# Patient Record
Sex: FEMALE | Race: WHITE | NOT HISPANIC OR LATINO | URBAN - NONMETROPOLITAN AREA
[De-identification: names, ages, dates, MRNs, and addresses within clinical notes are randomized per-mention and may not be internally consistent; named-entity substitution may affect disease eponyms.]

---

## 2023-06-05 DIAGNOSIS — E03.9 HYPOTHYROIDISM, UNSPECIFIED TYPE: ICD-10-CM

## 2023-06-05 DIAGNOSIS — I48.0 PAROXYSMAL ATRIAL FIBRILLATION (MULTI): ICD-10-CM

## 2023-06-05 DIAGNOSIS — I10 BENIGN ESSENTIAL HTN: Primary | ICD-10-CM

## 2023-06-05 DIAGNOSIS — E78.49 OTHER HYPERLIPIDEMIA: ICD-10-CM

## 2023-06-05 RX ORDER — AMLODIPINE BESYLATE 5 MG/1
5 TABLET ORAL DAILY
COMMUNITY
End: 2023-06-05 | Stop reason: SDUPTHER

## 2023-06-05 RX ORDER — LEVOTHYROXINE SODIUM 75 UG/1
75 TABLET ORAL DAILY
Qty: 30 TABLET | Refills: 0 | Status: SHIPPED | OUTPATIENT
Start: 2023-06-05 | End: 2023-07-14 | Stop reason: SDUPTHER

## 2023-06-05 RX ORDER — LEVOTHYROXINE SODIUM 75 UG/1
1 TABLET ORAL DAILY
COMMUNITY
Start: 2013-10-18 | End: 2023-06-05 | Stop reason: SDUPTHER

## 2023-06-05 RX ORDER — LOSARTAN POTASSIUM 100 MG/1
100 TABLET ORAL DAILY
Qty: 30 TABLET | Refills: 0 | Status: SHIPPED | OUTPATIENT
Start: 2023-06-05 | End: 2023-07-14 | Stop reason: SDUPTHER

## 2023-06-05 RX ORDER — LOSARTAN POTASSIUM 100 MG/1
100 TABLET ORAL DAILY
COMMUNITY
End: 2023-06-05 | Stop reason: SDUPTHER

## 2023-06-05 RX ORDER — LOSARTAN POTASSIUM 100 MG/1
TABLET ORAL
Qty: 90 TABLET | Refills: 0 | OUTPATIENT
Start: 2023-06-05

## 2023-06-05 RX ORDER — METOPROLOL TARTRATE 100 MG/1
100 TABLET ORAL 2 TIMES DAILY
Qty: 60 TABLET | Refills: 0 | Status: SHIPPED | OUTPATIENT
Start: 2023-06-05 | End: 2023-07-14 | Stop reason: SDUPTHER

## 2023-06-05 RX ORDER — PRAVASTATIN SODIUM 40 MG/1
40 TABLET ORAL DAILY
COMMUNITY
End: 2023-06-05 | Stop reason: SDUPTHER

## 2023-06-05 RX ORDER — APIXABAN 2.5 MG/1
2.5 TABLET, FILM COATED ORAL 2 TIMES DAILY
COMMUNITY
Start: 2019-01-23 | End: 2023-06-05 | Stop reason: SDUPTHER

## 2023-06-05 RX ORDER — AMLODIPINE BESYLATE 5 MG/1
5 TABLET ORAL DAILY
Qty: 30 TABLET | Refills: 0 | Status: SHIPPED | OUTPATIENT
Start: 2023-06-05 | End: 2023-07-14 | Stop reason: SDUPTHER

## 2023-06-05 RX ORDER — METOPROLOL TARTRATE 100 MG/1
100 TABLET ORAL 2 TIMES DAILY
COMMUNITY
End: 2023-06-05 | Stop reason: SDUPTHER

## 2023-06-05 RX ORDER — PRAVASTATIN SODIUM 40 MG/1
40 TABLET ORAL DAILY
Qty: 30 TABLET | Refills: 0 | Status: SHIPPED | OUTPATIENT
Start: 2023-06-05 | End: 2023-07-14 | Stop reason: SDUPTHER

## 2023-06-05 RX ORDER — PRAVASTATIN SODIUM 40 MG/1
TABLET ORAL
Qty: 90 TABLET | Refills: 0 | OUTPATIENT
Start: 2023-06-05

## 2023-07-06 PROBLEM — H35.30 MACULAR DEGENERATION: Status: ACTIVE | Noted: 2023-07-06

## 2023-07-06 PROBLEM — I48.0 PAROXYSMAL ATRIAL FIBRILLATION (MULTI): Status: ACTIVE | Noted: 2023-07-06

## 2023-07-06 PROBLEM — M54.50 LOW BACK PAIN: Status: ACTIVE | Noted: 2023-07-06

## 2023-07-06 PROBLEM — M85.80 OSTEOPENIA: Status: ACTIVE | Noted: 2023-07-06

## 2023-07-06 PROBLEM — I10 BENIGN ESSENTIAL HYPERTENSION: Status: ACTIVE | Noted: 2023-07-06

## 2023-07-06 PROBLEM — E55.9 VITAMIN D DEFICIENCY: Status: ACTIVE | Noted: 2023-07-06

## 2023-07-06 PROBLEM — E03.9 HYPOTHYROIDISM: Status: ACTIVE | Noted: 2023-07-06

## 2023-07-06 PROBLEM — E78.5 HYPERLIPIDEMIA: Status: ACTIVE | Noted: 2023-07-06

## 2023-07-06 PROBLEM — M79.606 LOWER EXTREMITY PAIN, DIFFUSE: Status: ACTIVE | Noted: 2023-07-06

## 2023-07-14 ENCOUNTER — OFFICE VISIT (OUTPATIENT)
Dept: PRIMARY CARE | Facility: CLINIC | Age: 88
End: 2023-07-14
Payer: MEDICARE

## 2023-07-14 VITALS
OXYGEN SATURATION: 97 % | BODY MASS INDEX: 22.26 KG/M2 | DIASTOLIC BLOOD PRESSURE: 68 MMHG | HEART RATE: 57 BPM | SYSTOLIC BLOOD PRESSURE: 132 MMHG | HEIGHT: 62 IN | WEIGHT: 121 LBS

## 2023-07-14 DIAGNOSIS — E78.49 OTHER HYPERLIPIDEMIA: ICD-10-CM

## 2023-07-14 DIAGNOSIS — B35.0 TINEA CAPITIS: ICD-10-CM

## 2023-07-14 DIAGNOSIS — F33.9 DEPRESSION, RECURRENT (CMS-HCC): ICD-10-CM

## 2023-07-14 DIAGNOSIS — E03.9 HYPOTHYROIDISM, UNSPECIFIED TYPE: ICD-10-CM

## 2023-07-14 DIAGNOSIS — E55.9 VITAMIN D DEFICIENCY: ICD-10-CM

## 2023-07-14 DIAGNOSIS — Z00.00 ROUTINE GENERAL MEDICAL EXAMINATION AT HEALTH CARE FACILITY: Primary | ICD-10-CM

## 2023-07-14 DIAGNOSIS — I48.0 PAROXYSMAL ATRIAL FIBRILLATION (MULTI): ICD-10-CM

## 2023-07-14 DIAGNOSIS — I10 BENIGN ESSENTIAL HTN: ICD-10-CM

## 2023-07-14 LAB
ALANINE AMINOTRANSFERASE (SGPT) (U/L) IN SER/PLAS: 9 U/L (ref 7–45)
ALBUMIN (G/DL) IN SER/PLAS: 4 G/DL (ref 3.4–5)
ALKALINE PHOSPHATASE (U/L) IN SER/PLAS: 76 U/L (ref 33–136)
ANION GAP IN SER/PLAS: 10 MMOL/L (ref 10–20)
ASPARTATE AMINOTRANSFERASE (SGOT) (U/L) IN SER/PLAS: 16 U/L (ref 9–39)
BASOPHILS (10*3/UL) IN BLOOD BY AUTOMATED COUNT: 0.04 X10E9/L (ref 0–0.1)
BASOPHILS/100 LEUKOCYTES IN BLOOD BY AUTOMATED COUNT: 0.8 % (ref 0–2)
BILIRUBIN TOTAL (MG/DL) IN SER/PLAS: 0.9 MG/DL (ref 0–1.2)
CALCIUM (MG/DL) IN SER/PLAS: 8.8 MG/DL (ref 8.6–10.3)
CARBON DIOXIDE, TOTAL (MMOL/L) IN SER/PLAS: 29 MMOL/L (ref 21–32)
CHLORIDE (MMOL/L) IN SER/PLAS: 101 MMOL/L (ref 98–107)
CREATININE (MG/DL) IN SER/PLAS: 0.83 MG/DL (ref 0.5–1.05)
EOSINOPHILS (10*3/UL) IN BLOOD BY AUTOMATED COUNT: 0.07 X10E9/L (ref 0–0.4)
EOSINOPHILS/100 LEUKOCYTES IN BLOOD BY AUTOMATED COUNT: 1.5 % (ref 0–6)
ERYTHROCYTE DISTRIBUTION WIDTH (RATIO) BY AUTOMATED COUNT: 14.3 % (ref 11.5–14.5)
ERYTHROCYTE MEAN CORPUSCULAR HEMOGLOBIN CONCENTRATION (G/DL) BY AUTOMATED: 30.8 G/DL (ref 32–36)
ERYTHROCYTE MEAN CORPUSCULAR VOLUME (FL) BY AUTOMATED COUNT: 92 FL (ref 80–100)
ERYTHROCYTES (10*6/UL) IN BLOOD BY AUTOMATED COUNT: 4.46 X10E12/L (ref 4–5.2)
GFR FEMALE: 65 ML/MIN/1.73M2
GLUCOSE (MG/DL) IN SER/PLAS: 88 MG/DL (ref 74–99)
HEMATOCRIT (%) IN BLOOD BY AUTOMATED COUNT: 40.9 % (ref 36–46)
HEMOGLOBIN (G/DL) IN BLOOD: 12.6 G/DL (ref 12–16)
IMMATURE GRANULOCYTES/100 LEUKOCYTES IN BLOOD BY AUTOMATED COUNT: 0.2 % (ref 0–0.9)
LEUKOCYTES (10*3/UL) IN BLOOD BY AUTOMATED COUNT: 4.8 X10E9/L (ref 4.4–11.3)
LYMPHOCYTES (10*3/UL) IN BLOOD BY AUTOMATED COUNT: 1.12 X10E9/L (ref 0.8–3)
LYMPHOCYTES/100 LEUKOCYTES IN BLOOD BY AUTOMATED COUNT: 23.3 % (ref 13–44)
MONOCYTES (10*3/UL) IN BLOOD BY AUTOMATED COUNT: 0.46 X10E9/L (ref 0.05–0.8)
MONOCYTES/100 LEUKOCYTES IN BLOOD BY AUTOMATED COUNT: 9.6 % (ref 2–10)
NEUTROPHILS (10*3/UL) IN BLOOD BY AUTOMATED COUNT: 3.11 X10E9/L (ref 1.6–5.5)
NEUTROPHILS/100 LEUKOCYTES IN BLOOD BY AUTOMATED COUNT: 64.6 % (ref 40–80)
PLATELETS (10*3/UL) IN BLOOD AUTOMATED COUNT: 203 X10E9/L (ref 150–450)
POTASSIUM (MMOL/L) IN SER/PLAS: 4.1 MMOL/L (ref 3.5–5.3)
PROTEIN TOTAL: 6.9 G/DL (ref 6.4–8.2)
SODIUM (MMOL/L) IN SER/PLAS: 136 MMOL/L (ref 136–145)
THYROTROPIN (MIU/L) IN SER/PLAS BY DETECTION LIMIT <= 0.05 MIU/L: 11.64 MIU/L (ref 0.44–3.98)
UREA NITROGEN (MG/DL) IN SER/PLAS: 15 MG/DL (ref 6–23)

## 2023-07-14 PROCEDURE — 3075F SYST BP GE 130 - 139MM HG: CPT | Performed by: FAMILY MEDICINE

## 2023-07-14 PROCEDURE — 84443 ASSAY THYROID STIM HORMONE: CPT

## 2023-07-14 PROCEDURE — 99214 OFFICE O/P EST MOD 30 MIN: CPT | Performed by: FAMILY MEDICINE

## 2023-07-14 PROCEDURE — 1170F FXNL STATUS ASSESSED: CPT | Performed by: FAMILY MEDICINE

## 2023-07-14 PROCEDURE — 82306 VITAMIN D 25 HYDROXY: CPT

## 2023-07-14 PROCEDURE — 3078F DIAST BP <80 MM HG: CPT | Performed by: FAMILY MEDICINE

## 2023-07-14 PROCEDURE — 1160F RVW MEDS BY RX/DR IN RCRD: CPT | Performed by: FAMILY MEDICINE

## 2023-07-14 PROCEDURE — 1159F MED LIST DOCD IN RCRD: CPT | Performed by: FAMILY MEDICINE

## 2023-07-14 PROCEDURE — 80053 COMPREHEN METABOLIC PANEL: CPT

## 2023-07-14 PROCEDURE — 85025 COMPLETE CBC W/AUTO DIFF WBC: CPT

## 2023-07-14 PROCEDURE — G0439 PPPS, SUBSEQ VISIT: HCPCS | Performed by: FAMILY MEDICINE

## 2023-07-14 PROCEDURE — 1036F TOBACCO NON-USER: CPT | Performed by: FAMILY MEDICINE

## 2023-07-14 RX ORDER — METOPROLOL TARTRATE 100 MG/1
100 TABLET ORAL 2 TIMES DAILY
Qty: 180 TABLET | Refills: 3 | Status: SHIPPED | OUTPATIENT
Start: 2023-07-14

## 2023-07-14 RX ORDER — AMLODIPINE BESYLATE 5 MG/1
5 TABLET ORAL DAILY
Qty: 90 TABLET | Refills: 3 | Status: SHIPPED | OUTPATIENT
Start: 2023-07-14

## 2023-07-14 RX ORDER — KETOCONAZOLE 20 MG/G
CREAM TOPICAL 2 TIMES DAILY
Qty: 30 G | Refills: 2 | Status: SHIPPED | OUTPATIENT
Start: 2023-07-14 | End: 2023-07-24

## 2023-07-14 RX ORDER — LEVOTHYROXINE SODIUM 75 UG/1
75 TABLET ORAL DAILY
Qty: 90 TABLET | Refills: 3 | Status: SHIPPED | OUTPATIENT
Start: 2023-07-14

## 2023-07-14 RX ORDER — PRAVASTATIN SODIUM 40 MG/1
40 TABLET ORAL DAILY
Qty: 90 TABLET | Refills: 3 | Status: SHIPPED | OUTPATIENT
Start: 2023-07-14

## 2023-07-14 RX ORDER — ERGOCALCIFEROL 1.25 MG/1
50000 CAPSULE ORAL
Qty: 12 CAPSULE | Refills: 3 | Status: SHIPPED | OUTPATIENT
Start: 2023-07-14 | End: 2024-06-08

## 2023-07-14 RX ORDER — LOSARTAN POTASSIUM 100 MG/1
100 TABLET ORAL DAILY
Qty: 90 TABLET | Refills: 3 | Status: SHIPPED | OUTPATIENT
Start: 2023-07-14

## 2023-07-14 ASSESSMENT — PATIENT HEALTH QUESTIONNAIRE - PHQ9
6. FEELING BAD ABOUT YOURSELF - OR THAT YOU ARE A FAILURE OR HAVE LET YOURSELF OR YOUR FAMILY DOWN: NOT AT ALL
2. FEELING DOWN, DEPRESSED OR HOPELESS: NEARLY EVERY DAY
10. IF YOU CHECKED OFF ANY PROBLEMS, HOW DIFFICULT HAVE THESE PROBLEMS MADE IT FOR YOU TO DO YOUR WORK, TAKE CARE OF THINGS AT HOME, OR GET ALONG WITH OTHER PEOPLE: SOMEWHAT DIFFICULT
9. THOUGHTS THAT YOU WOULD BE BETTER OFF DEAD, OR OF HURTING YOURSELF: NOT AT ALL
8. MOVING OR SPEAKING SO SLOWLY THAT OTHER PEOPLE COULD HAVE NOTICED. OR THE OPPOSITE, BEING SO FIGETY OR RESTLESS THAT YOU HAVE BEEN MOVING AROUND A LOT MORE THAN USUAL: NOT AT ALL
SUM OF ALL RESPONSES TO PHQ9 QUESTIONS 1 AND 2: 4
7. TROUBLE CONCENTRATING ON THINGS, SUCH AS READING THE NEWSPAPER OR WATCHING TELEVISION: NOT AT ALL
5. POOR APPETITE OR OVEREATING: NEARLY EVERY DAY
4. FEELING TIRED OR HAVING LITTLE ENERGY: NEARLY EVERY DAY
3. TROUBLE FALLING OR STAYING ASLEEP OR SLEEPING TOO MUCH: NEARLY EVERY DAY
1. LITTLE INTEREST OR PLEASURE IN DOING THINGS: SEVERAL DAYS
SUM OF ALL RESPONSES TO PHQ QUESTIONS 1-9: 13

## 2023-07-14 ASSESSMENT — ACTIVITIES OF DAILY LIVING (ADL)
GROCERY_SHOPPING: NEEDS ASSISTANCE
MANAGING_FINANCES: NEEDS ASSISTANCE
DRESSING: INDEPENDENT
BATHING: INDEPENDENT
TAKING_MEDICATION: INDEPENDENT
DOING_HOUSEWORK: INDEPENDENT

## 2023-07-14 NOTE — ASSESSMENT & PLAN NOTE
Moderate depression -  declines medication -   Mostly due to how limited she is in her life -   She declines HHC or home PT -   I discussed reaching out for help when she needs it - she seemed to appreciate that

## 2023-07-14 NOTE — PATIENT INSTRUCTIONS
"When you need help with anything - please let me know  - I might be able to find you some help       I sent your refills to [facility]       Lets have you use the ketoconazole cream for your rash -  apply a small amout twice a day for 10 days  - do not get any in your eye       You need see the eye doctor - you could go to [facility]      We can give you blank copies of your living will and medial power of  papers  -   When you have them done - we need copies of them.        If you get to a point that you want to be Do NOT Resuscitate - let me know  - there is a paper we can fill out       ABOUT MEDICARE PREVENTATIVE APPOINTMENTS:     YOUR YEARLY MEDICARE WELLNESS VISIT IS VERY IMPORTANT.      Medicare wants all of their patients to schedule their \"Welcome to Medicare\" visit  when you are in the first 12 months of being on Medicare.    Then every year after that, they want you to schedule your  \"Annual Wellness\"  visit.     These visits have a very specific list of topics I have to cover at the visit,  so you will have paperwork you need to complete before I see you.   Of interest,  there is NOT actually a physical exam as part of this visit.       If you are female,   a Well Woman Exam  (Breast exam and pelvic exam) - are paid for by Medicare every 2 years.   Mammograms are paid for every year.    If you are due for this exam too,  the Medicare wellness and the well woman exam can be done on the same day,  PLEASE TELL THIS TO  WHEN MAKING THE APPOINTMENT.      Some of the Medicare plans ALSO cover a traditional \"physical\" - which has more of the physical exam component.   You may want to find out if your insurance covers that too.       (this is  the code - 08164)  - That can be added to the visit as well.    You would need to tell us.     IT'S VERY HELPFUL IF YOU KNOW WHAT YOUR PLAN COVERS AHEAD OF THE VISIT.      Please check to see what your plan(s) cover.   (Even checking on testing and " "vaccines that are covered or not helps us greatly!)     At these appointments ,  IF  we cover any of your chronic medical conditions,  medical concerns,  or medications,  I will also be billing brabi  \"E/M  code\" which stands for \"Evaluation and Management\"    -  which is a regular office visit code.    THAT CHARGE MAY BE SUBJECT TO CO-PAYS AND DEDUCTIBLES.     PLEASE DO NOT \"SAVE\" ALL OF YOUR CONCERNS TO GO OVER AT THESE YEARLY WELLNESS VISITS.   YOU SHOULD BE SCHEDULING SEPARATE APPOINTMENTS WHEN YOU HAVE HEALTH CONCERNS TO DISCUSS AND FOR YOUR MEDICATION CHECK UP APPOINTMENTS.        Thank you.            MAKE SURE YOU MAKE AN APPT IN A YEAR         "

## 2023-07-14 NOTE — PROGRESS NOTES
Subjective   Reason for Visit: Susan Singh is an 89 y.o. female here for a Medicare Wellness visit and follow up     Past Medical, Surgical, and Family History reviewed and updated in chart.    Reviewed all medications by prescribing practitioner or clinical pharmacist (such as prescriptions, OTCs, herbal therapies and supplements) and documented in the medical record.    HPI       Last appt 12/2020  - on the phone   LOV in office for HTN in OCT 2019     Updates and concerns:     Medicare screening  -  PHQ 9 score of 15                                        (Moderate severe depression)        Mostly - not sleeping well     Admits she she is not feeling well -   Worried a lot ,  hard to get around or do much   Does have someone help her with the books  Does not want medication     Has a rash above her left eye  - itches and flakes off    Gets meds at [facility] - does not want to change        Does not want to use [facility]     Has an hearing aid for her right ear     Eliquis is expensive - can still take it twice a day     Wants me to refill her meds today     VISION - VERY POOR ; when rash clears up  - she will go back to optho                 Medical issues reviewed today:     She was admitted to [facility] 1/22 - 1/23/19 - Afib with RVR -   Eliquis added - but HR controlled in hospital.   Echo done - EF 55 - 60 %      CT head:   IMPRESSION:  No acute infarct, hemorrhage or mass is noted.     The white matter and left basal ganglia lucencies may be secondary to  chronic microvascular ischemic or degenerative changes among others.        HTN - amlodipine 5 mg BID daily ; Losartan 100 mg daily, metoprolol tar 100 mg BID   Not checked BP lately      Watching sodium intake better.      Hypothyroid - Levo 75 mcg a day - takes it daily   10 /2019 TSH was WNL      Hyperchol - pravastatin 40 mg daily     She states she had a colonoscopy 2000 (age 72) - REPORT NOW ON CHART -   does not want another one      Does not get mammograms  "anymore.     Vaccines -   Flu: does not get them   Pneumonia - did have 23 and 13   Shingles - declines   COVID  -  she states she got \"all the vaccines\"   Tetnas/pertussis -        Falls - did fall when she was dizzy back in January 2019   States she has not fallen in the past year   Uses a walker    Declines home PT  or HHC      Does not drive.   She states she does not go out much. She lives with her brother - they grocery shop together - Brother drives -  He is 82      Living will and medical POA  - not done yet         Patient Care Team:  Fanny Santacruz MD as PCP - General     Review of Systems    Objective   Vitals:  /68 (BP Location: Right arm, Patient Position: Sitting, BP Cuff Size: Adult)   Pulse 57   Ht 1.562 m (5' 1.5\")   Wt 54.9 kg (121 lb)   SpO2 97%   BMI 22.49 kg/m²       Physical Exam  Vitals reviewed.   Constitutional:       General: She is not in acute distress.     Comments: Elderly woman , using walker , NAD    HENT:      Head: Normocephalic and atraumatic.      Nose: Nose normal.      Mouth/Throat:      Pharynx: No posterior oropharyngeal erythema.   Eyes:      Extraocular Movements: Extraocular movements intact.      Conjunctiva/sclera: Conjunctivae normal.      Pupils: Pupils are equal, round, and reactive to light.   Cardiovascular:      Rate and Rhythm: Normal rate and regular rhythm.      Heart sounds: Normal heart sounds. No murmur heard.  Pulmonary:      Effort: Pulmonary effort is normal. No respiratory distress.      Breath sounds: Normal breath sounds. No wheezing.   Abdominal:      Palpations: Abdomen is soft.      Tenderness: There is no abdominal tenderness.   Musculoskeletal:      Cervical back: Neck supple.   Lymphadenopathy:      Cervical: No cervical adenopathy.   Skin:     General: Skin is warm and dry.      Findings: Rash present.      Comments: Patch of thickened dry flaking skin over her left brow and forehead    Neurological:      General: No focal " deficit present.      Mental Status: She is alert and oriented to person, place, and time. Mental status is at baseline.   Psychiatric:         Mood and Affect: Mood normal.         Thought Content: Thought content normal.         Assessment/Plan   Problem List Items Addressed This Visit          High    Hyperlipidemia    Relevant Medications    pravastatin (Pravachol) 40 mg tablet    Other Relevant Orders    Comprehensive Metabolic Panel (Completed)    CBC and Auto Differential (Completed)    Thyroid Stimulating Hormone (Completed)    Vitamin D, Total    Hypothyroidism    Relevant Medications    levothyroxine (Synthroid, Levoxyl) 75 mcg tablet    Other Relevant Orders    Comprehensive Metabolic Panel (Completed)    CBC and Auto Differential (Completed)    Thyroid Stimulating Hormone (Completed)    Vitamin D, Total    Paroxysmal atrial fibrillation (CMS/HCC)    Relevant Medications    amLODIPine (Norvasc) 5 mg tablet    apixaban (Eliquis) 2.5 mg tablet    metoprolol tartrate (Lopressor) 100 mg tablet       Medium    Vitamin D deficiency    Relevant Medications    ergocalciferol (Vitamin D-2) 1.25 MG (32593 UT) capsule    Other Relevant Orders    Comprehensive Metabolic Panel (Completed)    CBC and Auto Differential (Completed)    Thyroid Stimulating Hormone (Completed)    Vitamin D, Total    Depression, recurrent (CMS/HCC)    Current Assessment & Plan     Moderate depression -  declines medication -   Mostly due to how limited she is in her life -   She declines HHC or home PT -   I discussed reaching out for help when she needs it - she seemed to appreciate that           Other Visit Diagnoses       Routine general medical examination at health care facility    -  Primary    Tinea capitis        Relevant Medications    ketoconazole (NIZOral) 2 % cream    Benign essential HTN        Relevant Medications    amLODIPine (Norvasc) 5 mg tablet    losartan (Cozaar) 100 mg tablet    metoprolol tartrate (Lopressor) 100 mg  tablet    Other Relevant Orders    Comprehensive Metabolic Panel (Completed)    CBC and Auto Differential (Completed)    Thyroid Stimulating Hormone (Completed)    Vitamin D, Total          First visit back in office since 2019 -     Medicare wellness today - age precludes most testing and she declines too     She seems generally stable  -   I refilled meds how she wanted me to     Rash on face looks like tinea - try ketoconazole cream     Urged eye appt     Urged filling out Living will and Medical POA papers -   She was not sure about DNR     She is using a walker which I told her was a great idea     She declined home health care or home PT -     Urged her to call anytime she needs something - I likely could find a way to help her  -     Can keep appts at yearly     Labs today     We discussed at visit any disease processes that were of concern as well as the risks, benefits and instructions of any new medication provided.    See orders and discussion section for information handed to patient on their Clinical Summary.   Patient (and/or caretaker of patient if present)  stated all questions were answered, and they voiced understanding of instructions.

## 2023-07-15 LAB — CALCIDIOL (25 OH VITAMIN D3) (NG/ML) IN SER/PLAS: 34 NG/ML

## 2023-07-17 DIAGNOSIS — E03.9 HYPOTHYROIDISM, UNSPECIFIED TYPE: Primary | ICD-10-CM

## 2023-08-22 ENCOUNTER — OFFICE VISIT (OUTPATIENT)
Dept: PRIMARY CARE | Facility: CLINIC | Age: 88
End: 2023-08-22
Payer: MEDICARE

## 2023-08-22 DIAGNOSIS — E03.9 HYPOTHYROIDISM, UNSPECIFIED TYPE: ICD-10-CM

## 2023-08-22 LAB — THYROTROPIN (MIU/L) IN SER/PLAS BY DETECTION LIMIT <= 0.05 MIU/L: 1.32 MIU/L (ref 0.44–3.98)

## 2023-08-22 PROCEDURE — 84443 ASSAY THYROID STIM HORMONE: CPT

## 2024-06-08 DIAGNOSIS — E55.9 VITAMIN D DEFICIENCY: ICD-10-CM

## 2024-06-08 RX ORDER — ERGOCALCIFEROL 1.25 MG/1
1 CAPSULE ORAL
Qty: 12 CAPSULE | Refills: 3 | Status: SHIPPED | OUTPATIENT
Start: 2024-06-09

## 2024-07-19 DIAGNOSIS — E03.9 HYPOTHYROIDISM, UNSPECIFIED TYPE: ICD-10-CM

## 2024-07-19 DIAGNOSIS — I10 BENIGN ESSENTIAL HTN: ICD-10-CM

## 2024-07-19 RX ORDER — LOSARTAN POTASSIUM 100 MG/1
100 TABLET ORAL DAILY
Qty: 90 TABLET | Refills: 0 | Status: SHIPPED | OUTPATIENT
Start: 2024-07-19

## 2024-07-19 RX ORDER — METOPROLOL TARTRATE 100 MG/1
100 TABLET ORAL 2 TIMES DAILY
Qty: 180 TABLET | Refills: 0 | Status: SHIPPED | OUTPATIENT
Start: 2024-07-19

## 2024-07-19 RX ORDER — LEVOTHYROXINE SODIUM 75 UG/1
75 TABLET ORAL DAILY
Qty: 90 TABLET | Refills: 0 | Status: SHIPPED | OUTPATIENT
Start: 2024-07-19

## 2024-07-27 DIAGNOSIS — E78.49 OTHER HYPERLIPIDEMIA: ICD-10-CM

## 2024-07-27 RX ORDER — PRAVASTATIN SODIUM 40 MG/1
40 TABLET ORAL DAILY
Qty: 90 TABLET | Refills: 0 | Status: SHIPPED | OUTPATIENT
Start: 2024-07-27

## 2024-08-06 DIAGNOSIS — I48.0 PAROXYSMAL ATRIAL FIBRILLATION (MULTI): ICD-10-CM

## 2024-08-06 RX ORDER — APIXABAN 2.5 MG/1
2.5 TABLET, FILM COATED ORAL 2 TIMES DAILY
Qty: 90 TABLET | Refills: 0 | Status: SHIPPED | OUTPATIENT
Start: 2024-08-06

## 2024-09-20 DIAGNOSIS — I10 BENIGN ESSENTIAL HTN: ICD-10-CM

## 2024-09-20 RX ORDER — AMLODIPINE BESYLATE 5 MG/1
5 TABLET ORAL DAILY
Qty: 90 TABLET | Refills: 0 | Status: SHIPPED | OUTPATIENT
Start: 2024-09-20

## 2024-09-26 ENCOUNTER — APPOINTMENT (OUTPATIENT)
Dept: PRIMARY CARE | Facility: CLINIC | Age: 89
End: 2024-09-26
Payer: MEDICARE

## 2024-09-26 VITALS
HEIGHT: 60 IN | OXYGEN SATURATION: 100 % | BODY MASS INDEX: 22.78 KG/M2 | DIASTOLIC BLOOD PRESSURE: 64 MMHG | HEART RATE: 57 BPM | WEIGHT: 116 LBS | SYSTOLIC BLOOD PRESSURE: 162 MMHG

## 2024-09-26 DIAGNOSIS — I10 BENIGN ESSENTIAL HTN: ICD-10-CM

## 2024-09-26 DIAGNOSIS — Z00.00 ROUTINE GENERAL MEDICAL EXAMINATION AT HEALTH CARE FACILITY: Primary | ICD-10-CM

## 2024-09-26 DIAGNOSIS — I48.0 PAROXYSMAL ATRIAL FIBRILLATION (MULTI): ICD-10-CM

## 2024-09-26 DIAGNOSIS — E78.49 OTHER HYPERLIPIDEMIA: ICD-10-CM

## 2024-09-26 DIAGNOSIS — E03.9 HYPOTHYROIDISM, UNSPECIFIED TYPE: ICD-10-CM

## 2024-09-26 LAB
ALBUMIN SERPL BCP-MCNC: 3.9 G/DL (ref 3.4–5)
ALP SERPL-CCNC: 76 U/L (ref 33–136)
ALT SERPL W P-5'-P-CCNC: 7 U/L (ref 7–45)
ANION GAP SERPL CALC-SCNC: 10 MMOL/L (ref 10–20)
AST SERPL W P-5'-P-CCNC: 15 U/L (ref 9–39)
BASOPHILS # BLD AUTO: 0.03 X10*3/UL (ref 0–0.1)
BASOPHILS NFR BLD AUTO: 0.7 %
BILIRUB SERPL-MCNC: 0.9 MG/DL (ref 0–1.2)
BUN SERPL-MCNC: 19 MG/DL (ref 6–23)
CALCIUM SERPL-MCNC: 8.8 MG/DL (ref 8.6–10.3)
CHLORIDE SERPL-SCNC: 102 MMOL/L (ref 98–107)
CO2 SERPL-SCNC: 29 MMOL/L (ref 21–32)
CREAT SERPL-MCNC: 0.85 MG/DL (ref 0.5–1.05)
EGFRCR SERPLBLD CKD-EPI 2021: 63 ML/MIN/1.73M*2
EOSINOPHIL # BLD AUTO: 0.11 X10*3/UL (ref 0–0.4)
EOSINOPHIL NFR BLD AUTO: 2.4 %
ERYTHROCYTE [DISTWIDTH] IN BLOOD BY AUTOMATED COUNT: 13.8 % (ref 11.5–14.5)
GLUCOSE SERPL-MCNC: 100 MG/DL (ref 74–99)
HCT VFR BLD AUTO: 38.6 % (ref 36–46)
HGB BLD-MCNC: 12.5 G/DL (ref 12–16)
IMM GRANULOCYTES # BLD AUTO: 0.02 X10*3/UL (ref 0–0.5)
IMM GRANULOCYTES NFR BLD AUTO: 0.4 % (ref 0–0.9)
LYMPHOCYTES # BLD AUTO: 0.96 X10*3/UL (ref 0.8–3)
LYMPHOCYTES NFR BLD AUTO: 21.1 %
MCH RBC QN AUTO: 29.2 PG (ref 26–34)
MCHC RBC AUTO-ENTMCNC: 32.4 G/DL (ref 32–36)
MCV RBC AUTO: 90 FL (ref 80–100)
MONOCYTES # BLD AUTO: 0.46 X10*3/UL (ref 0.05–0.8)
MONOCYTES NFR BLD AUTO: 10.1 %
NEUTROPHILS # BLD AUTO: 2.98 X10*3/UL (ref 1.6–5.5)
NEUTROPHILS NFR BLD AUTO: 65.3 %
NRBC BLD-RTO: 0 /100 WBCS (ref 0–0)
PLATELET # BLD AUTO: 207 X10*3/UL (ref 150–450)
POTASSIUM SERPL-SCNC: 4.4 MMOL/L (ref 3.5–5.3)
PROT SERPL-MCNC: 6.3 G/DL (ref 6.4–8.2)
RBC # BLD AUTO: 4.28 X10*6/UL (ref 4–5.2)
SODIUM SERPL-SCNC: 137 MMOL/L (ref 136–145)
T4 FREE SERPL-MCNC: 0.74 NG/DL (ref 0.61–1.12)
TSH SERPL-ACNC: 8.01 MIU/L (ref 0.44–3.98)
WBC # BLD AUTO: 4.6 X10*3/UL (ref 4.4–11.3)

## 2024-09-26 PROCEDURE — 1036F TOBACCO NON-USER: CPT | Performed by: FAMILY MEDICINE

## 2024-09-26 PROCEDURE — 84439 ASSAY OF FREE THYROXINE: CPT

## 2024-09-26 PROCEDURE — 1170F FXNL STATUS ASSESSED: CPT | Performed by: FAMILY MEDICINE

## 2024-09-26 PROCEDURE — 85025 COMPLETE CBC W/AUTO DIFF WBC: CPT

## 2024-09-26 PROCEDURE — 3078F DIAST BP <80 MM HG: CPT | Performed by: FAMILY MEDICINE

## 2024-09-26 PROCEDURE — 80053 COMPREHEN METABOLIC PANEL: CPT

## 2024-09-26 PROCEDURE — 1159F MED LIST DOCD IN RCRD: CPT | Performed by: FAMILY MEDICINE

## 2024-09-26 PROCEDURE — G0439 PPPS, SUBSEQ VISIT: HCPCS | Performed by: FAMILY MEDICINE

## 2024-09-26 PROCEDURE — 84443 ASSAY THYROID STIM HORMONE: CPT

## 2024-09-26 PROCEDURE — 3077F SYST BP >= 140 MM HG: CPT | Performed by: FAMILY MEDICINE

## 2024-09-26 PROCEDURE — 1160F RVW MEDS BY RX/DR IN RCRD: CPT | Performed by: FAMILY MEDICINE

## 2024-09-26 PROCEDURE — 99214 OFFICE O/P EST MOD 30 MIN: CPT | Performed by: FAMILY MEDICINE

## 2024-09-26 RX ORDER — LOSARTAN POTASSIUM 100 MG/1
100 TABLET ORAL DAILY
Qty: 90 TABLET | Refills: 3 | Status: SHIPPED | OUTPATIENT
Start: 2024-09-26

## 2024-09-26 RX ORDER — METOPROLOL TARTRATE 100 MG/1
100 TABLET ORAL 2 TIMES DAILY
Qty: 180 TABLET | Refills: 3 | Status: SHIPPED | OUTPATIENT
Start: 2024-09-26

## 2024-09-26 RX ORDER — PRAVASTATIN SODIUM 40 MG/1
40 TABLET ORAL DAILY
Qty: 90 TABLET | Refills: 3 | Status: SHIPPED | OUTPATIENT
Start: 2024-09-26

## 2024-09-26 RX ORDER — AMLODIPINE BESYLATE 5 MG/1
5 TABLET ORAL DAILY
Qty: 90 TABLET | Refills: 3 | Status: SHIPPED | OUTPATIENT
Start: 2024-09-26

## 2024-09-26 RX ORDER — LEVOTHYROXINE SODIUM 75 UG/1
75 TABLET ORAL DAILY
Qty: 90 TABLET | Refills: 3 | Status: SHIPPED | OUTPATIENT
Start: 2024-09-26

## 2024-09-26 ASSESSMENT — ACTIVITIES OF DAILY LIVING (ADL)
DOING_HOUSEWORK: INDEPENDENT
GROCERY_SHOPPING: INDEPENDENT
MANAGING_FINANCES: INDEPENDENT
DRESSING: INDEPENDENT
TAKING_MEDICATION: INDEPENDENT
BATHING: INDEPENDENT

## 2024-09-26 ASSESSMENT — PATIENT HEALTH QUESTIONNAIRE - PHQ9
SUM OF ALL RESPONSES TO PHQ QUESTIONS 1-9: 7
10. IF YOU CHECKED OFF ANY PROBLEMS, HOW DIFFICULT HAVE THESE PROBLEMS MADE IT FOR YOU TO DO YOUR WORK, TAKE CARE OF THINGS AT HOME, OR GET ALONG WITH OTHER PEOPLE: NOT DIFFICULT AT ALL
SUM OF ALL RESPONSES TO PHQ9 QUESTIONS 1 AND 2: 3
9. THOUGHTS THAT YOU WOULD BE BETTER OFF DEAD, OR OF HURTING YOURSELF: SEVERAL DAYS
1. LITTLE INTEREST OR PLEASURE IN DOING THINGS: NOT AT ALL
8. MOVING OR SPEAKING SO SLOWLY THAT OTHER PEOPLE COULD HAVE NOTICED. OR THE OPPOSITE, BEING SO FIGETY OR RESTLESS THAT YOU HAVE BEEN MOVING AROUND A LOT MORE THAN USUAL: SEVERAL DAYS
2. FEELING DOWN, DEPRESSED OR HOPELESS: NEARLY EVERY DAY
7. TROUBLE CONCENTRATING ON THINGS, SUCH AS READING THE NEWSPAPER OR WATCHING TELEVISION: NOT AT ALL
4. FEELING TIRED OR HAVING LITTLE ENERGY: SEVERAL DAYS
5. POOR APPETITE OR OVEREATING: NOT AT ALL
6. FEELING BAD ABOUT YOURSELF - OR THAT YOU ARE A FAILURE OR HAVE LET YOURSELF OR YOUR FAMILY DOWN: NOT AT ALL
3. TROUBLE FALLING OR STAYING ASLEEP OR SLEEPING TOO MUCH: SEVERAL DAYS

## 2024-09-26 NOTE — PATIENT INSTRUCTIONS
I sent your meds to [facility] - you will have to let them know when you need them     I can see you in a year if all is fine.        I will mail you out your test results       Hypertension Reminders:    IF YOU ARE A PERSON WHOSE BLOOD PRESSURE RUNS HIGH IN THE DOCTOR'S OFFICE,  THEN WE NEED TO VERIFY YOUR CUFF AT LEAST  ONCE YEARLY.   ALWAYS BRING CUFF WITH YOU TO ANY HYPERTENSION CHECK UP APPOINTMENT.  WE CAN RECORD YOUR BP  FROM HOME THE DAY OF THE APPOINTMENT - BUT WE HAVE TO SEE IT ON YOUR MACHINE.      To accurately check your blood pressure -  be sure to sit and relax for 5 minutes, you need your back supported, feet flat on the ground, arm heart level and relaxed.    Generally speaking, well controlled hypertension is below 130/80   for  most people  and if you are over 75, below 140/90  is acceptable.    Please take your medication as directed, and if you forget a dose  DO NOT DOUBLE THE DOSE THE NEXT DAY, just take is as you normally would.     It is important to stay on a low sodium diet :  1500 - 2000 mg of sodium a day  -  it is important to read labels.    Regular Exercise is very important as well.  Always gradually increase your exercise regimen.  Your goal is 30 minutes of a good cardiovascular exercise at least 5 days a week.    IF YOUR BMI (BODY MASS INDEX) IS OVER 25, LOSING WEIGHT WILL HELP CONTROL YOUR BLOOD PRESSURE.   Talk to me further if you need help doing this.        It is very important NOT to smoke  or use any tobacco products.  Talk to me about options if you want help quitting.    It is very important to keep your alcohol in take low.   Generally speaking, adult men should not drink more than 2 regular size beers a day, or no more than 2 ounces of liquor, or no more than 12 ounces of wine.  For adult women - the recommendations are half that.    BUT , THIS IS NOT UNIVERSAL   - be sure to ask me if alcohol is safe for you to drink, and if so, the acceptable amount.          For  "General Healthy Nutrition    (Remember - NOT A DIET!   Diets are only good for class reunions.)    These are my general good nutrition recommendations for most people.   I use the term \" diet \"  in these instructions to mean your overall nutrition - how you eat and drink.   If we talked about something different during your visit with me,  other than what is written below,  follow that advice instead.       For most people,  eating healthier means getting less added sugar and less processed foods in your diet    The fresher the better.    Added sugar is now a part of the nutrition label on manufactured food, so you can keep an eye on it easier.    But basically,  foods and beverages  that contain regular sugar and corn syrup are the main sources of added sugars.  Eating as little of these foods as you can is best.   One shocking example of the epidemic of added sugar is soda.    One can of regular soda contains about as much added sugar as 3 regular size doughnuts!     The other issue with processed foods is the amount of processed grains they contain , such as white flour.    This is also something you want to try to limit in your diet.     But, grain products are very important for your nutrition.    Whole grains are better for your body.     Cutting back on white breads, traditional pasta, baked goods, white rice,  and processed cereals will be healthier for you.   The better choices include whole grain breads,  whole wheat pasta,  brown rice, quinoa, barley, steel cut  or rolled oats.   If you eat cereal for breakfast, try to look for one made with whole grains and less sugar.   There are many people who have a problem with gluten, for a large variety of reasons.    Generally,  products made with wheat flour , barley or rye are the primary source of gluten.       Cutting back on saturated fats is important.    You want the majority of the meat that you eat to be chicken, fish or turkey.   Baked or broiled is " "best -  fried adds too much fat.    There are healthy fats that are important - fat is important for holding down appetite, vitamin absorption and several metabolic processes in the body.  Monounsaturated fats raise HDL (good cholesterol) and lower LDL (bad cholesterol).   Olive oil, peanut oil, nuts, seeds, and avocados are great sources of the good fats.       Ideas are:   Trade sour cream dip for hummus (which is rich in olive oil) or guacamole; use veggies or whole-wheat chips to dip.    Nuts are an excellent source of protein and healthy fats.   Tree nuts are the best kind, such as almonds or walnuts.   Just be careful - they are high in calories, so stick to a serving size.  (Most are about 200 calories for a 1/4 cup)      Proteins are very important for your body, and they also hold down your appetite.   Try to have protein with every meal.    These generally are meats, nuts, many beans, legumes, eggs, and dairy.   You will find protein in whole grain products and some green vegetables have a little too.     When you have dairy (if you can - many people are lactose intolerant) try to make it low fat.    Ideas are 1% milk, lowfat yogurt or cheeses, low fat cottage cheese.   I don't generally recommend FAT FREE because they often contain artificial products to improve taste, and the fat helps hold down your appetite.   If you are lactose intolerant, try to see if taking Lactaid before having dairy helps.      Fresh fruits and vegetables are VERY important.  The brighter the better.   Many vegetables are considered \"Free Foods\" - meaning you can eat as much as you want, and it does not matter.  These include tomatoes, cucumbers, celery, peppers, all the various lettuces and kale - to name a few.   Potatoes, corn and peas are starchy, so do have more calories, but are still healthy - you just want to watch the amount of them you eat.       Fruits are full of wonderful nutrition.   They contain natural sugar " called fructose, so eating them in moderation is best.   Diabetics may need to pay careful attention to how their body reacts to the sugar.  Some fruits might drastically increase their blood sugar.      Eating smaller meals with a couple of small snacks is better for your metabolism than not eating for long amounts of time  (breakfast is very important).   Trying to avoid large meals is helpful too.    Eating like this helps keep your appetite down and keeps you in burning metabolism rather than storage metabolism so your body will use the calories you eat.       I do not tell people to stop eating sweets or snack foods - just limit the amounts you have.  The less the better.   Pay attention to serving sizes, and treat them as a treat.        Foods like doughnuts, pop tarts, sugar cereals, cookies  ARE NOT GOOD FOR BREAKFAST.   They are loaded with sugar and will cause you to be hungrier in the day and often not feel well.    Caffeine needs to be limited - no more than 2 servings a day.  Some people can't have any at all.    (if you have any sleep or anxiety issues - stop the caffeine)   Coffee, many teas, many sodas, energy drinks, almost any diet supplement,  and chocolate all contain caffeine.      Water is important.   For most people, 8   x  8 ounces  a day are needed.  This may vary for some health issues.    If you need to be on a low sodium diet, that means looking at labels and eating only 1000 - 2000 mg of sodium a day.    Calcium intake is important.  3 servings of a high calcium food or drink a day is recommended.   This is usually a cup of milk, a cup of yogurt, an ounce of a hard cheese or 1.5 ounces of a soft cheese are the usual servings.   There are other high calcium foods - including soy or almond milk, broccoli,  almonds, dark green leafy vegetable.   Make sure you are not getting more than 1000  - 1200 mg of total calcium a day (unless you have been told you need more by a doctor).    Vitamin D  3 is important to absorb the calcium and for your immune system.   For children, 400 IU a day is recommended.   For adults - 800 - 5000 IU a day  is recommended.  (Often the amount needed is individualized for adults - be sure to ask how much is right for you)    Physical activity is very important for good health.    Finding activities that give you regular exercise is very important for good health.  Try to find exercise you enjoy doing on a regular basis.    30 minutes at least 5 days a week of a good cardiovascular exercise is recommended.   That means something that gets your heart rate going faster than your usual baseline and you can find yourself breathing harder than usual while you are exercising.  If you have not done any exercise in a long time,  make sure you ask if its safe for you to start,  and be sure to gradually work up to your goal.      If you need to lose weight,  following these recommendations will help you.   And if you are doing all of this and still not losing weight, then its likely just the amount of food you are eating.   Learn to cut back on portion sizes.  Using smaller plates may help.  Healthy weight loss is  only about a pound a week.   You have to remember that whatever you do to lose the weight, you must be prepared to keep it up for life for the weight to stay off.     A lot of people have a lot of luck with using something like a fit bit,  or a program where you keep track of all of your calories that you eat and what you burn off in the day.

## 2024-09-26 NOTE — PROGRESS NOTES
"Subjective   Reason for Visit: Susan Singh is an 89 y.o. female here for a Medicare Wellness visit and follow up     Past Medical, Surgical, and Family History reviewed and updated in chart.    Reviewed all medications by prescribing practitioner or clinical pharmacist (such as prescriptions, OTCs, herbal therapies and supplements) and documented in the medical record.    HPI       Last appt 7/2023     Updates and concerns:   89 year old woman - will only come see me once a year - does not want much done.       Rash on head  is healed up         Gets meds at [facility] - does not want to change        Does not want to use [facility]     Has an hearing aid for her right ear  -   Acting up  - got from [facility]    Eliquis is expensive - can still take it twice a day  - gets just 90 at at time  - declines samples     Wants me to refill her meds today  - but not sure she needs yet     VISION - [facility]  - has not gone     Getting solicitor calls all the time          Medical issues reviewed today:     She was admitted to [facility] 1/22 - 1/23/19 - Afib with RVR -   Eliquis added - but HR controlled in hospital.   Echo done - EF 55 - 60 %      CT head:   IMPRESSION:  No acute infarct, hemorrhage or mass is noted.     The white matter and left basal ganglia lucencies may be secondary to  chronic microvascular ischemic or degenerative changes among others.        HTN -   amlodipine 5 mg BID daily ;   Losartan 100 mg daily,   metoprolol tar 100 mg BID   Not checked BP lately      Watching sodium intake better.      Hypothyroid - Levo 75 mcg a day - takes it daily   2023  TSH was WNL      Hyperchol - pravastatin 40 mg daily     She states she had a colonoscopy 2000 (age 72) - REPORT NOW ON CHART -   does not want another one      Does not get mammograms anymore.     Vaccines -   Flu: does not get them   Pneumonia - did have 23 and 13 . Declines 20   Shingles - declines   COVID  -  she states she got \"all the vaccines\"   Tetnas/pertussis -      "   Falls - States she has not fallen in the past year   Uses a walker    Declines home PT  or HHC      Does not drive.   She states she does not go out much. She lives with her brother - they grocery shop together - Brother drives -  He is 83     Sister  - Paloma Leal      Then had 6 bothers-       Only Sree , Yordy and Kale are alive       Living will and medical POA  - not done yet         Patient Care Team:  Fanny Santacruz MD as PCP - General  Fanny Santacruz MD [facility] PCP - [facility]ributed [facility]     Review of Systems    Objective   Vitals:  /64 (BP Location: Right arm, Patient Position: Sitting, BP Cuff Size: Adult)   Pulse 57   Ht 1.524 m (5')   Wt 52.6 kg (116 lb)   SpO2 100%   BMI 22.65 kg/m²       Physical Exam  Vitals reviewed.   Constitutional:       General: She is not in acute distress.     Comments: Elderly woman , using walker , NAD    HENT:      Head: Normocephalic and atraumatic.      Nose: Nose normal.      Mouth/Throat:      Pharynx: No posterior oropharyngeal erythema.   Eyes:      Extraocular Movements: Extraocular movements intact.      Conjunctiva/sclera: Conjunctivae normal.      Pupils: Pupils are equal, round, and reactive to light.   Cardiovascular:      Rate and Rhythm: Normal rate and regular rhythm.      Heart sounds: Normal heart sounds. No murmur heard.  Pulmonary:      Effort: Pulmonary effort is normal. No respiratory distress.      Breath sounds: Normal breath sounds. No wheezing.   Abdominal:      Palpations: Abdomen is soft.      Tenderness: There is no abdominal tenderness.   Musculoskeletal:      Cervical back: Neck supple.   Lymphadenopathy:      Cervical: No cervical adenopathy.   Skin:     General: Skin is warm and dry.      Findings: No rash.   Neurological:      General: No focal deficit present.      Mental Status: She is alert and oriented to person, place, and time. Mental status is at baseline.   Psychiatric:         Mood and  Affect: Mood normal.         Thought Content: Thought content normal.         Assessment/Plan   Problem List Items Addressed This Visit          High    Hyperlipidemia    Relevant Medications    pravastatin (Pravachol) 40 mg tablet    Hypothyroidism    Relevant Medications    levothyroxine (Synthroid, Levoxyl) 75 mcg tablet    Paroxysmal atrial fibrillation (Multi)    Relevant Medications    amLODIPine (Norvasc) 5 mg tablet    metoprolol tartrate (Lopressor) 100 mg tablet    apixaban (Eliquis) 2.5 mg tablet     Other Visit Diagnoses       Routine general medical examination at health care facility    -  Primary    Benign essential HTN        Relevant Medications    amLODIPine (Norvasc) 5 mg tablet    losartan (Cozaar) 100 mg tablet    metoprolol tartrate (Lopressor) 100 mg tablet    Other Relevant Orders    Comprehensive Metabolic Panel    CBC and Auto Differential    TSH with reflex to Free T4 if abnormal              Medicare wellness today - age precludes most testing and she declines too     She seems generally stable  -   I refilled meds how she wanted me to     Urged eye appt  and going back to [facility] to check hearing aid     Urged filling out Living will and Medical POA papers -     She is using a walker which I told her was a great idea     She declined home health care or home PT -     Urged her to call anytime she needs something - I likely could find a way to help her  -     Can keep appts at yearly     Labs today     We discussed at visit any disease processes that were of concern as well as the risks, benefits and instructions of any new medication provided.    See orders and discussion section for information handed to patient on their Clinical Summary.   Patient (and/or caretaker of patient if present)  stated all questions were answered, and they voiced understanding of instructions.

## 2024-10-04 ENCOUNTER — TELEPHONE (OUTPATIENT)
Dept: PRIMARY CARE | Facility: CLINIC | Age: 89
End: 2024-10-04
Payer: MEDICARE

## 2024-10-04 ENCOUNTER — DOCUMENTATION (OUTPATIENT)
Dept: PRIMARY CARE | Facility: CLINIC | Age: 89
End: 2024-10-04
Payer: MEDICARE

## 2024-10-04 DIAGNOSIS — E03.9 HYPOTHYROIDISM, UNSPECIFIED TYPE: Primary | ICD-10-CM

## 2024-10-04 RX ORDER — LEVOTHYROXINE SODIUM 100 UG/1
100 TABLET ORAL DAILY
Qty: 90 TABLET | Refills: 3 | Status: SHIPPED | OUTPATIENT
Start: 2024-10-04 | End: 2025-10-04

## 2025-05-01 DIAGNOSIS — I10 BENIGN ESSENTIAL HTN: ICD-10-CM

## 2025-05-01 RX ORDER — METOPROLOL TARTRATE 100 MG/1
100 TABLET ORAL 2 TIMES DAILY
Qty: 180 TABLET | Refills: 0 | Status: SHIPPED | OUTPATIENT
Start: 2025-05-01

## 2025-06-17 DIAGNOSIS — I10 BENIGN ESSENTIAL HTN: ICD-10-CM

## 2025-06-17 RX ORDER — METOPROLOL TARTRATE 100 MG/1
100 TABLET ORAL 2 TIMES DAILY
Qty: 180 TABLET | Refills: 0 | Status: SHIPPED | OUTPATIENT
Start: 2025-06-17

## 2025-08-08 ENCOUNTER — HOSPITAL ENCOUNTER (INPATIENT)
Facility: HOSPITAL | Age: OVER 89
End: 2025-08-08
Attending: EMERGENCY MEDICINE | Admitting: STUDENT IN AN ORGANIZED HEALTH CARE EDUCATION/TRAINING PROGRAM
Payer: MEDICARE

## 2025-08-08 ENCOUNTER — APPOINTMENT (OUTPATIENT)
Dept: RADIOLOGY | Facility: HOSPITAL | Age: OVER 89
DRG: 291 | End: 2025-08-08
Payer: MEDICARE

## 2025-08-08 ENCOUNTER — APPOINTMENT (OUTPATIENT)
Dept: CARDIOLOGY | Facility: HOSPITAL | Age: OVER 89
DRG: 291 | End: 2025-08-08
Payer: MEDICARE

## 2025-08-08 DIAGNOSIS — I50.21 ACUTE SYSTOLIC HEART FAILURE: Primary | ICD-10-CM

## 2025-08-08 DIAGNOSIS — I48.91 ATRIAL FIBRILLATION WITH RAPID VENTRICULAR RESPONSE (MULTI): ICD-10-CM

## 2025-08-08 DIAGNOSIS — E03.9 ACQUIRED HYPOTHYROIDISM: ICD-10-CM

## 2025-08-08 DIAGNOSIS — R60.0 LOWER EXTREMITY EDEMA: ICD-10-CM

## 2025-08-08 DIAGNOSIS — J90 PLEURAL EFFUSION: ICD-10-CM

## 2025-08-08 DIAGNOSIS — R09.02 HYPOXIA: ICD-10-CM

## 2025-08-08 LAB
ALBUMIN SERPL BCP-MCNC: 3.7 G/DL (ref 3.4–5)
ALP SERPL-CCNC: 94 U/L (ref 33–136)
ALT SERPL W P-5'-P-CCNC: 24 U/L (ref 7–45)
ANION GAP BLDV CALCULATED.4IONS-SCNC: 9 MMOL/L (ref 10–25)
ANION GAP SERPL CALC-SCNC: 16 MMOL/L (ref 10–20)
AST SERPL W P-5'-P-CCNC: 25 U/L (ref 9–39)
BASE EXCESS BLDV CALC-SCNC: -1.4 MMOL/L (ref -2–3)
BASOPHILS # BLD AUTO: 0.04 X10*3/UL (ref 0–0.1)
BASOPHILS NFR BLD AUTO: 0.8 %
BILIRUB SERPL-MCNC: 0.9 MG/DL (ref 0–1.2)
BNP SERPL-MCNC: 604 PG/ML (ref 0–99)
BODY TEMPERATURE: ABNORMAL
BUN SERPL-MCNC: 33 MG/DL (ref 6–23)
CA-I BLDV-SCNC: 1.14 MMOL/L (ref 1.1–1.33)
CALCIUM SERPL-MCNC: 8.9 MG/DL (ref 8.6–10.3)
CARDIAC TROPONIN I PNL SERPL HS: 121 NG/L (ref 0–13)
CARDIAC TROPONIN I PNL SERPL HS: 224 NG/L (ref 0–13)
CHLORIDE BLDV-SCNC: 106 MMOL/L (ref 98–107)
CHLORIDE SERPL-SCNC: 104 MMOL/L (ref 98–107)
CO2 SERPL-SCNC: 23 MMOL/L (ref 21–32)
CREAT SERPL-MCNC: 1.32 MG/DL (ref 0.5–1.05)
EGFRCR SERPLBLD CKD-EPI 2021: 37 ML/MIN/1.73M*2
EOSINOPHIL # BLD AUTO: 0.07 X10*3/UL (ref 0–0.4)
EOSINOPHIL NFR BLD AUTO: 1.4 %
ERYTHROCYTE [DISTWIDTH] IN BLOOD BY AUTOMATED COUNT: 15 % (ref 11.5–14.5)
FLUAV RNA RESP QL NAA+PROBE: NOT DETECTED
FLUBV RNA RESP QL NAA+PROBE: NOT DETECTED
GLUCOSE BLD MANUAL STRIP-MCNC: 128 MG/DL (ref 74–99)
GLUCOSE BLDV-MCNC: 127 MG/DL (ref 74–99)
GLUCOSE SERPL-MCNC: 114 MG/DL (ref 74–99)
HCO3 BLDV-SCNC: 22.8 MMOL/L (ref 22–26)
HCT VFR BLD AUTO: 40.9 % (ref 36–46)
HCT VFR BLD EST: 40 % (ref 36–46)
HGB BLD-MCNC: 13 G/DL (ref 12–16)
HGB BLDV-MCNC: 13.4 G/DL (ref 12–16)
IMM GRANULOCYTES # BLD AUTO: 0.01 X10*3/UL (ref 0–0.5)
IMM GRANULOCYTES NFR BLD AUTO: 0.2 % (ref 0–0.9)
INHALED O2 CONCENTRATION: 100 %
LACTATE BLDV-SCNC: 2.3 MMOL/L (ref 0.4–2)
LYMPHOCYTES # BLD AUTO: 1.4 X10*3/UL (ref 0.8–3)
LYMPHOCYTES NFR BLD AUTO: 27.8 %
MAGNESIUM SERPL-MCNC: 2.33 MG/DL (ref 1.6–2.4)
MCH RBC QN AUTO: 28.6 PG (ref 26–34)
MCHC RBC AUTO-ENTMCNC: 31.8 G/DL (ref 32–36)
MCV RBC AUTO: 90 FL (ref 80–100)
MONOCYTES # BLD AUTO: 0.52 X10*3/UL (ref 0.05–0.8)
MONOCYTES NFR BLD AUTO: 10.3 %
NEUTROPHILS # BLD AUTO: 2.99 X10*3/UL (ref 1.6–5.5)
NEUTROPHILS NFR BLD AUTO: 59.5 %
NRBC BLD-RTO: 0 /100 WBCS (ref 0–0)
OXYHGB MFR BLDV: 67.2 % (ref 45–75)
PCO2 BLDV: 36 MM HG (ref 41–51)
PH BLDV: 7.41 PH (ref 7.33–7.43)
PLATELET # BLD AUTO: 239 X10*3/UL (ref 150–450)
PO2 BLDV: 40 MM HG (ref 35–45)
POTASSIUM BLDV-SCNC: 4.2 MMOL/L (ref 3.5–5.3)
POTASSIUM SERPL-SCNC: 4.6 MMOL/L (ref 3.5–5.3)
PROT SERPL-MCNC: 6.7 G/DL (ref 6.4–8.2)
Q ONSET: 217 MS
QRS COUNT: 22 BEATS
QRS DURATION: 136 MS
QT INTERVAL: 346 MS
QTC CALCULATION(BAZETT): 512 MS
QTC FREDERICIA: 450 MS
R AXIS: -25 DEGREES
RBC # BLD AUTO: 4.54 X10*6/UL (ref 4–5.2)
SAO2 % BLDV: 69 % (ref 45–75)
SARS-COV-2 RNA RESP QL NAA+PROBE: NOT DETECTED
SODIUM BLDV-SCNC: 134 MMOL/L (ref 136–145)
SODIUM SERPL-SCNC: 138 MMOL/L (ref 136–145)
T AXIS: 143 DEGREES
T OFFSET: 390 MS
T4 FREE SERPL-MCNC: 2.29 NG/DL (ref 0.61–1.12)
TSH SERPL-ACNC: 0.03 MIU/L (ref 0.44–3.98)
VENTRICULAR RATE: 132 BPM
WBC # BLD AUTO: 5 X10*3/UL (ref 4.4–11.3)

## 2025-08-08 PROCEDURE — 84439 ASSAY OF FREE THYROXINE: CPT | Performed by: EMERGENCY MEDICINE

## 2025-08-08 PROCEDURE — 85025 COMPLETE CBC W/AUTO DIFF WBC: CPT | Performed by: EMERGENCY MEDICINE

## 2025-08-08 PROCEDURE — 2500000004 HC RX 250 GENERAL PHARMACY W/ HCPCS (ALT 636 FOR OP/ED): Performed by: EMERGENCY MEDICINE

## 2025-08-08 PROCEDURE — 84075 ASSAY ALKALINE PHOSPHATASE: CPT | Performed by: EMERGENCY MEDICINE

## 2025-08-08 PROCEDURE — 84484 ASSAY OF TROPONIN QUANT: CPT | Performed by: EMERGENCY MEDICINE

## 2025-08-08 PROCEDURE — 2500000001 HC RX 250 WO HCPCS SELF ADMINISTERED DRUGS (ALT 637 FOR MEDICARE OP): Performed by: EMERGENCY MEDICINE

## 2025-08-08 PROCEDURE — 2500000004 HC RX 250 GENERAL PHARMACY W/ HCPCS (ALT 636 FOR OP/ED): Performed by: STUDENT IN AN ORGANIZED HEALTH CARE EDUCATION/TRAINING PROGRAM

## 2025-08-08 PROCEDURE — 96374 THER/PROPH/DIAG INJ IV PUSH: CPT

## 2025-08-08 PROCEDURE — 2500000002 HC RX 250 W HCPCS SELF ADMINISTERED DRUGS (ALT 637 FOR MEDICARE OP, ALT 636 FOR OP/ED): Performed by: NURSE PRACTITIONER

## 2025-08-08 PROCEDURE — 36415 COLL VENOUS BLD VENIPUNCTURE: CPT | Performed by: STUDENT IN AN ORGANIZED HEALTH CARE EDUCATION/TRAINING PROGRAM

## 2025-08-08 PROCEDURE — 2500000004 HC RX 250 GENERAL PHARMACY W/ HCPCS (ALT 636 FOR OP/ED): Performed by: NURSE PRACTITIONER

## 2025-08-08 PROCEDURE — 71045 X-RAY EXAM CHEST 1 VIEW: CPT | Performed by: RADIOLOGY

## 2025-08-08 PROCEDURE — 70450 CT HEAD/BRAIN W/O DYE: CPT | Performed by: RADIOLOGY

## 2025-08-08 PROCEDURE — 71045 X-RAY EXAM CHEST 1 VIEW: CPT

## 2025-08-08 PROCEDURE — 94760 N-INVAS EAR/PLS OXIMETRY 1: CPT

## 2025-08-08 PROCEDURE — 87636 SARSCOV2 & INF A&B AMP PRB: CPT | Performed by: EMERGENCY MEDICINE

## 2025-08-08 PROCEDURE — 99223 1ST HOSP IP/OBS HIGH 75: CPT | Performed by: STUDENT IN AN ORGANIZED HEALTH CARE EDUCATION/TRAINING PROGRAM

## 2025-08-08 PROCEDURE — 1200000002 HC GENERAL ROOM WITH TELEMETRY DAILY

## 2025-08-08 PROCEDURE — 70450 CT HEAD/BRAIN W/O DYE: CPT

## 2025-08-08 PROCEDURE — 36415 COLL VENOUS BLD VENIPUNCTURE: CPT | Performed by: EMERGENCY MEDICINE

## 2025-08-08 PROCEDURE — 84484 ASSAY OF TROPONIN QUANT: CPT | Performed by: STUDENT IN AN ORGANIZED HEALTH CARE EDUCATION/TRAINING PROGRAM

## 2025-08-08 PROCEDURE — 84132 ASSAY OF SERUM POTASSIUM: CPT | Performed by: EMERGENCY MEDICINE

## 2025-08-08 PROCEDURE — 80053 COMPREHEN METABOLIC PANEL: CPT | Performed by: EMERGENCY MEDICINE

## 2025-08-08 PROCEDURE — 82947 ASSAY GLUCOSE BLOOD QUANT: CPT

## 2025-08-08 PROCEDURE — 93005 ELECTROCARDIOGRAM TRACING: CPT

## 2025-08-08 PROCEDURE — 2500000001 HC RX 250 WO HCPCS SELF ADMINISTERED DRUGS (ALT 637 FOR MEDICARE OP): Performed by: STUDENT IN AN ORGANIZED HEALTH CARE EDUCATION/TRAINING PROGRAM

## 2025-08-08 PROCEDURE — 84443 ASSAY THYROID STIM HORMONE: CPT | Performed by: EMERGENCY MEDICINE

## 2025-08-08 PROCEDURE — 83735 ASSAY OF MAGNESIUM: CPT | Performed by: EMERGENCY MEDICINE

## 2025-08-08 PROCEDURE — 83880 ASSAY OF NATRIURETIC PEPTIDE: CPT | Performed by: EMERGENCY MEDICINE

## 2025-08-08 PROCEDURE — C8929 TTE W OR WO FOL WCON,DOPPLER: HCPCS

## 2025-08-08 PROCEDURE — 99291 CRITICAL CARE FIRST HOUR: CPT | Mod: 25 | Performed by: EMERGENCY MEDICINE

## 2025-08-08 RX ORDER — AMLODIPINE BESYLATE 5 MG/1
5 TABLET ORAL DAILY
Status: ACTIVE | OUTPATIENT
Start: 2025-08-08

## 2025-08-08 RX ORDER — AMOXICILLIN 250 MG
2 CAPSULE ORAL NIGHTLY PRN
Status: DISCONTINUED | OUTPATIENT
Start: 2025-08-08 | End: 2025-08-09

## 2025-08-08 RX ORDER — AMIODARONE HYDROCHLORIDE 200 MG/1
400 TABLET ORAL 2 TIMES DAILY
Status: DISCONTINUED | OUTPATIENT
Start: 2025-08-08 | End: 2025-08-09

## 2025-08-08 RX ORDER — ONDANSETRON HYDROCHLORIDE 2 MG/ML
4 INJECTION, SOLUTION INTRAVENOUS EVERY 6 HOURS PRN
Status: ACTIVE | OUTPATIENT
Start: 2025-08-08

## 2025-08-08 RX ORDER — METOPROLOL TARTRATE 50 MG/1
100 TABLET ORAL 2 TIMES DAILY
Status: DISCONTINUED | OUTPATIENT
Start: 2025-08-08 | End: 2025-08-09

## 2025-08-08 RX ORDER — LOSARTAN POTASSIUM 50 MG/1
100 TABLET ORAL DAILY
Status: ACTIVE | OUTPATIENT
Start: 2025-08-08

## 2025-08-08 RX ORDER — AMIODARONE HYDROCHLORIDE 200 MG/1
400 TABLET ORAL DAILY
Status: DISCONTINUED | OUTPATIENT
Start: 2025-08-12 | End: 2025-08-09

## 2025-08-08 RX ORDER — METOPROLOL TARTRATE 1 MG/ML
5 INJECTION, SOLUTION INTRAVENOUS ONCE
Status: COMPLETED | OUTPATIENT
Start: 2025-08-08 | End: 2025-08-08

## 2025-08-08 RX ORDER — FUROSEMIDE 10 MG/ML
60 INJECTION INTRAMUSCULAR; INTRAVENOUS ONCE
Status: COMPLETED | OUTPATIENT
Start: 2025-08-08 | End: 2025-08-08

## 2025-08-08 RX ORDER — FUROSEMIDE 10 MG/ML
40 INJECTION INTRAMUSCULAR; INTRAVENOUS ONCE
Status: COMPLETED | OUTPATIENT
Start: 2025-08-08 | End: 2025-08-08

## 2025-08-08 RX ORDER — PRAVASTATIN SODIUM 40 MG/1
40 TABLET ORAL DAILY
Status: DISPENSED | OUTPATIENT
Start: 2025-08-08

## 2025-08-08 RX ORDER — METOPROLOL TARTRATE 50 MG/1
100 TABLET ORAL ONCE
Status: COMPLETED | OUTPATIENT
Start: 2025-08-08 | End: 2025-08-08

## 2025-08-08 RX ORDER — LEVOTHYROXINE SODIUM 100 UG/1
100 TABLET ORAL DAILY
Status: ACTIVE | OUTPATIENT
Start: 2025-08-08

## 2025-08-08 RX ORDER — ACETAMINOPHEN 500 MG
5 TABLET ORAL NIGHTLY PRN
Status: ACTIVE | OUTPATIENT
Start: 2025-08-08

## 2025-08-08 RX ORDER — FAMOTIDINE 20 MG/1
10 TABLET, FILM COATED ORAL 2 TIMES DAILY PRN
Status: ACTIVE | OUTPATIENT
Start: 2025-08-08

## 2025-08-08 RX ADMIN — METOPROLOL TARTRATE 100 MG: 50 TABLET, FILM COATED ORAL at 20:32

## 2025-08-08 RX ADMIN — METOPROLOL TARTRATE 100 MG: 50 TABLET, FILM COATED ORAL at 08:37

## 2025-08-08 RX ADMIN — APIXABAN 2.5 MG: 2.5 TABLET, FILM COATED ORAL at 10:00

## 2025-08-08 RX ADMIN — PERFLUTREN 2 ML OF DILUTION: 6.52 INJECTION, SUSPENSION INTRAVENOUS at 14:21

## 2025-08-08 RX ADMIN — PRAVASTATIN SODIUM 40 MG: 40 TABLET ORAL at 10:00

## 2025-08-08 RX ADMIN — METOPROLOL TARTRATE 5 MG: 1 INJECTION, SOLUTION INTRAVENOUS at 13:52

## 2025-08-08 RX ADMIN — METOPROLOL TARTRATE 5 MG: 1 INJECTION, SOLUTION INTRAVENOUS at 16:37

## 2025-08-08 RX ADMIN — FUROSEMIDE 60 MG: 10 INJECTION, SOLUTION INTRAMUSCULAR; INTRAVENOUS at 13:52

## 2025-08-08 RX ADMIN — AMIODARONE HYDROCHLORIDE 400 MG: 200 TABLET ORAL at 22:35

## 2025-08-08 RX ADMIN — FUROSEMIDE 40 MG: 10 INJECTION, SOLUTION INTRAMUSCULAR; INTRAVENOUS at 07:59

## 2025-08-08 RX ADMIN — APIXABAN 2.5 MG: 2.5 TABLET, FILM COATED ORAL at 20:32

## 2025-08-08 SDOH — SOCIAL STABILITY: SOCIAL INSECURITY: DO YOU FEEL ANYONE HAS EXPLOITED OR TAKEN ADVANTAGE OF YOU FINANCIALLY OR OF YOUR PERSONAL PROPERTY?: NO

## 2025-08-08 SDOH — SOCIAL STABILITY: SOCIAL INSECURITY: WITHIN THE LAST YEAR, HAVE YOU BEEN AFRAID OF YOUR PARTNER OR EX-PARTNER?: NO

## 2025-08-08 SDOH — SOCIAL STABILITY: SOCIAL INSECURITY: HAVE YOU HAD ANY THOUGHTS OF HARMING ANYONE ELSE?: NO

## 2025-08-08 SDOH — SOCIAL STABILITY: SOCIAL INSECURITY
WITHIN THE LAST YEAR, HAVE YOU BEEN KICKED, HIT, SLAPPED, OR OTHERWISE PHYSICALLY HURT BY YOUR PARTNER OR EX-PARTNER?: NO

## 2025-08-08 SDOH — HEALTH STABILITY: MENTAL HEALTH: HOW OFTEN DO YOU HAVE A DRINK CONTAINING ALCOHOL?: NEVER

## 2025-08-08 SDOH — HEALTH STABILITY: MENTAL HEALTH: HOW OFTEN DO YOU HAVE SIX OR MORE DRINKS ON ONE OCCASION?: NEVER

## 2025-08-08 SDOH — SOCIAL STABILITY: SOCIAL INSECURITY
WITHIN THE LAST YEAR, HAVE YOU BEEN RAPED OR FORCED TO HAVE ANY KIND OF SEXUAL ACTIVITY BY YOUR PARTNER OR EX-PARTNER?: NO

## 2025-08-08 SDOH — HEALTH STABILITY: MENTAL HEALTH: HOW MANY DRINKS CONTAINING ALCOHOL DO YOU HAVE ON A TYPICAL DAY WHEN YOU ARE DRINKING?: PATIENT DOES NOT DRINK

## 2025-08-08 SDOH — SOCIAL STABILITY: SOCIAL INSECURITY: DO YOU FEEL UNSAFE GOING BACK TO THE PLACE WHERE YOU ARE LIVING?: NO

## 2025-08-08 SDOH — ECONOMIC STABILITY: INCOME INSECURITY: IN THE PAST 12 MONTHS HAS THE ELECTRIC, GAS, OIL, OR WATER COMPANY THREATENED TO SHUT OFF SERVICES IN YOUR HOME?: NO

## 2025-08-08 SDOH — ECONOMIC STABILITY: FOOD INSECURITY: WITHIN THE PAST 12 MONTHS, YOU WORRIED THAT YOUR FOOD WOULD RUN OUT BEFORE YOU GOT THE MONEY TO BUY MORE.: NEVER TRUE

## 2025-08-08 SDOH — SOCIAL STABILITY: SOCIAL INSECURITY: HAS ANYONE EVER THREATENED TO HURT YOUR FAMILY OR YOUR PETS?: NO

## 2025-08-08 SDOH — SOCIAL STABILITY: SOCIAL INSECURITY: WITHIN THE LAST YEAR, HAVE YOU BEEN HUMILIATED OR EMOTIONALLY ABUSED IN OTHER WAYS BY YOUR PARTNER OR EX-PARTNER?: NO

## 2025-08-08 SDOH — ECONOMIC STABILITY: FOOD INSECURITY: WITHIN THE PAST 12 MONTHS, THE FOOD YOU BOUGHT JUST DIDN'T LAST AND YOU DIDN'T HAVE MONEY TO GET MORE.: NEVER TRUE

## 2025-08-08 SDOH — SOCIAL STABILITY: SOCIAL INSECURITY: ARE THERE ANY APPARENT SIGNS OF INJURIES/BEHAVIORS THAT COULD BE RELATED TO ABUSE/NEGLECT?: NO

## 2025-08-08 SDOH — SOCIAL STABILITY: SOCIAL INSECURITY: DOES ANYONE TRY TO KEEP YOU FROM HAVING/CONTACTING OTHER FRIENDS OR DOING THINGS OUTSIDE YOUR HOME?: NO

## 2025-08-08 SDOH — SOCIAL STABILITY: SOCIAL INSECURITY: ARE YOU OR HAVE YOU BEEN THREATENED OR ABUSED PHYSICALLY, EMOTIONALLY, OR SEXUALLY BY ANYONE?: NO

## 2025-08-08 SDOH — SOCIAL STABILITY: SOCIAL INSECURITY: WERE YOU ABLE TO COMPLETE ALL THE BEHAVIORAL HEALTH SCREENINGS?: YES

## 2025-08-08 SDOH — SOCIAL STABILITY: SOCIAL INSECURITY: HAVE YOU HAD THOUGHTS OF HARMING ANYONE ELSE?: NO

## 2025-08-08 SDOH — SOCIAL STABILITY: SOCIAL INSECURITY: ABUSE: ADULT

## 2025-08-08 ASSESSMENT — ENCOUNTER SYMPTOMS
DIZZINESS: 0
PALPITATIONS: 0
ABDOMINAL DISTENTION: 0
CHILLS: 0
SHORTNESS OF BREATH: 1
COUGH: 0
WEAKNESS: 0
ABDOMINAL PAIN: 0
FEVER: 0

## 2025-08-08 ASSESSMENT — LIFESTYLE VARIABLES
HAVE PEOPLE ANNOYED YOU BY CRITICIZING YOUR DRINKING: NO
AUDIT-C TOTAL SCORE: 0
EVER FELT BAD OR GUILTY ABOUT YOUR DRINKING: NO
EVER HAD A DRINK FIRST THING IN THE MORNING TO STEADY YOUR NERVES TO GET RID OF A HANGOVER: NO
TOTAL SCORE: 0
HAVE YOU EVER FELT YOU SHOULD CUT DOWN ON YOUR DRINKING: NO
SKIP TO QUESTIONS 9-10: 1

## 2025-08-08 ASSESSMENT — ACTIVITIES OF DAILY LIVING (ADL)
TOILETING: NEEDS ASSISTANCE
HEARING - LEFT EAR: HEARING AID
BATHING: NEEDS ASSISTANCE
ASSISTIVE_DEVICE: WALKER
GROOMING: NEEDS ASSISTANCE
JUDGMENT_ADEQUATE_SAFELY_COMPLETE_DAILY_ACTIVITIES: YES
LACK_OF_TRANSPORTATION: NO
FEEDING YOURSELF: INDEPENDENT
ADEQUATE_TO_COMPLETE_ADL: YES
HEARING - RIGHT EAR: HEARING AID
WALKS IN HOME: NEEDS ASSISTANCE
PATIENT'S MEMORY ADEQUATE TO SAFELY COMPLETE DAILY ACTIVITIES?: YES
DRESSING YOURSELF: NEEDS ASSISTANCE

## 2025-08-08 ASSESSMENT — COGNITIVE AND FUNCTIONAL STATUS - GENERAL
DAILY ACTIVITIY SCORE: 19
WALKING IN HOSPITAL ROOM: A LITTLE
DRESSING REGULAR UPPER BODY CLOTHING: A LITTLE
CLIMB 3 TO 5 STEPS WITH RAILING: A LITTLE
HELP NEEDED FOR BATHING: A LITTLE
DRESSING REGULAR LOWER BODY CLOTHING: A LITTLE
WALKING IN HOSPITAL ROOM: A LITTLE
TOILETING: A LITTLE
PERSONAL GROOMING: A LITTLE
PERSONAL GROOMING: A LITTLE
STANDING UP FROM CHAIR USING ARMS: A LITTLE
DRESSING REGULAR LOWER BODY CLOTHING: A LITTLE
MOVING TO AND FROM BED TO CHAIR: A LITTLE
PATIENT BASELINE BEDBOUND: NO
TOILETING: A LITTLE
MOVING TO AND FROM BED TO CHAIR: A LITTLE
DRESSING REGULAR UPPER BODY CLOTHING: A LITTLE
TURNING FROM BACK TO SIDE WHILE IN FLAT BAD: A LITTLE
MOBILITY SCORE: 18
DAILY ACTIVITIY SCORE: 19
CLIMB 3 TO 5 STEPS WITH RAILING: A LITTLE
MOBILITY SCORE: 18
MOVING FROM LYING ON BACK TO SITTING ON SIDE OF FLAT BED WITH BEDRAILS: A LITTLE
MOVING FROM LYING ON BACK TO SITTING ON SIDE OF FLAT BED WITH BEDRAILS: A LITTLE
STANDING UP FROM CHAIR USING ARMS: A LITTLE
TURNING FROM BACK TO SIDE WHILE IN FLAT BAD: A LITTLE
HELP NEEDED FOR BATHING: A LITTLE

## 2025-08-08 ASSESSMENT — PATIENT HEALTH QUESTIONNAIRE - PHQ9
2. FEELING DOWN, DEPRESSED OR HOPELESS: NOT AT ALL
SUM OF ALL RESPONSES TO PHQ9 QUESTIONS 1 & 2: 0
1. LITTLE INTEREST OR PLEASURE IN DOING THINGS: NOT AT ALL

## 2025-08-08 ASSESSMENT — PAIN SCALES - GENERAL
PAINLEVEL_OUTOF10: 0 - NO PAIN

## 2025-08-08 ASSESSMENT — PAIN - FUNCTIONAL ASSESSMENT
PAIN_FUNCTIONAL_ASSESSMENT: 0-10

## 2025-08-08 NOTE — ED PROCEDURE NOTE
Procedure  Critical Care    Performed by: Perez Villeda MD  Authorized by: Perez Villeda MD    Critical care provider statement:     Critical care time (minutes):  35    Critical care time was exclusive of:  Separately billable procedures and treating other patients and teaching time    Critical care was necessary to treat or prevent imminent or life-threatening deterioration of the following conditions:  Respiratory failure and cardiac failure    Critical care was time spent personally by me on the following activities:  Development of treatment plan with patient or surrogate, evaluation of patient's response to treatment, examination of patient, interpretation of cardiac output measurements, obtaining history from patient or surrogate, ordering and performing treatments and interventions, ordering and review of laboratory studies, ordering and review of radiographic studies, pulse oximetry, re-evaluation of patient's condition and review of old charts    Care discussed with: admitting provider                 Perez Villeda MD  08/08/25 2681

## 2025-08-08 NOTE — H&P
[facility]  Department of Hospital Medicine    HISTORY AND PHYSICAL    Chief Complaint   Patient presents with    Shortness of Breath     Pt states that she has been SOB for the past 2 days. Pt stated that she was unable to get to the doctors. EMS states that pt was 87% on room air.         History Of Present Illness  Susan Singh is a 89 y.o. female with atrial fibrillation on Eliquis, hypertension, hyperlipidemia, hypothyroidism.  She presented with 2 days of worsening shortness of breath in the setting of 4 to 5 months of fatigue, lightheadedness, and intermittent syncope.  In the ED, she was found to be in atrial fibrillation with RVR to the 130s and hypoxic requiring 4 L O2.  Labs showed PEPE, elevated BNP, elevated troponin, and low TSH with elevated T4.  Chest x-ray showed cardiomegaly with basilar edema and moderate bilateral effusions.  POCUS in the ED suggested low EF.     No fevers, chills, chest pain.  Endorses leg pain limiting ambulation.  Denies leg swelling.  Shortness of breath was much worse this morning, prompting ED visit.  Per family at bedside, she is often lightheaded and occasionally passes out.  Tries to avoid seeing doctors.  Does not follow with cardiology; has seen Dr. Lopez in the past while inpatient but currently only follows with her PCP who manages her cardiac medications.  Review of chart shows that she was admitted in 2019 for lightheadedness and found to have A-fib with RVR at that time.  Echo then showed normal EF.  Review of PCP note shows difficulty affording Eliquis, but she is still taking.  She takes care of her younger brother who is currently dealing with health issues; she has had higher than normal stress because of this.     Past Medical History  She has a past medical history of Atrial fibrillation (Multi), Dysuria (07/24/2014), Encounter for immunization (11/16/2015), Hypo-osmolality and hyponatremia, and Other conditions  "influencing health status.    Surgical History  She has a past surgical history that includes Cataract extraction (05/21/2013); Dilation and curettage of uterus (05/21/2013); Colonoscopy (02/18/2015); and Total hip arthroplasty (11/20/2013).     Social History  She reports that she has never smoked. She has never used smokeless tobacco. She reports that she does not drink alcohol and does not use drugs.    Family History  Family History[1]     Allergies  Ciprofloxacin and Hydrochlorothiazide    Review of systems  11-point ROS was performed and is negative except as noted in the HPI.     Physical Exam   Con: awake, alert; no distress;   Eyes: conjunctiva wnl; EOMI;   ENMT: hearing intact; MMM;   MSK: ROM wnl; digits wnl;   CV: Irregular tachycardia, mildly elevated JVP; trace LE edema;   Resp: normal work of breathing on nasal cannula; no cyanosis;   Neuro: moving all extremities; no abnormal movements  Psych: oriented to situation; affect wnl;       Last Recorded Vitals  Blood pressure 139/83, pulse (!) 130, temperature 35.8 °C (96.4 °F), temperature source Temporal, resp. rate (!) 22, height 1.626 m (5' 4\"), weight 59 kg (130 lb), SpO2 95%.    Relevant Results  Lab Results   Component Value Date    WBC 5.0 08/08/2025    HGB 13.0 08/08/2025    HCT 40.9 08/08/2025    MCV 90 08/08/2025     08/08/2025      Lab Results   Component Value Date    GLUCOSE 114 (H) 08/08/2025    CALCIUM 8.9 08/08/2025     08/08/2025    K 4.6 08/08/2025    CO2 23 08/08/2025     08/08/2025    BUN 33 (H) 08/08/2025    CREATININE 1.32 (H) 08/08/2025        Scheduled medications:  Scheduled Medications[2]  Continuous medications:  Continuous Medications[3]  PRN medications:  PRN Medications[4]        I personally reviewed all pertinent labwork, imaging, vital signs, and prior notes and discussed with the ED provider.         Assessment/Plan   Assessment & Plan  Acute systolic heart failure    Susan Singh is a 89 y.o. female " with atrial fibrillation on Eliquis, hypertension, hyperlipidemia, hypothyroidism.  She presented with 2 days of worsening shortness of breath in the setting of 4 to 5 months of fatigue, lightheadedness, and intermittent syncope.  In the ED, she was found to be in atrial fibrillation with RVR to the 130s and hypoxic requiring 4 L O2.  Labs showed PEPE, elevated BNP, elevated troponin, and low TSH with elevated T4.  Chest x-ray showed cardiomegaly with basilar edema and moderate bilateral effusions.  POCUS in the ED suggested low EF.     A-fib with RVR:  Acute decompensated heart failure: Suspect new systolic heart failure; EF was normal on echo in 2019  PEPE: Likely cardiorenal due to above  Acute hypoxic respiratory failure: Due to above  Hyperlipidemia:  Lightheadedness with intermittent syncope:  Elevated troponin: Likely non-MI troponin elevation due to above  - IV diuresis  - strict I/Os, daily weights, telemetry  - trend BMP, Mg; maintain K>4, Mg>2, troponin  - TTE  - Continue metoprolol, pravastatin, apixaban  - Hold amlodipine, losartan; reintroduce as tolerated  - Check orthostatic vitals  - wean O2  - Cardiology consulted    Hypothyroidism with thyrotoxicosis: Potential trigger for above  -Hold levothyroxine for now; decrease dose on discharge    DVT PPx: Apixaban    Dispo: Inpatient, likely 3-4 midnights pending clinical improvement.  PT/OT consulted.           Bryon Beltran MD    Patient Name:  Susan Singh   MRN:   [id number]   [facility]        [1] No family history on file.  [2]    [3]    [4] PRN medications: famotidine, melatonin, ondansetron, oxygen, sennosides-docusate sodium

## 2025-08-08 NOTE — ED PROVIDER NOTES
Emergency Department Provider Note        History of Present Illness     History provided by: Patient  Limitations to History: None  External Records Reviewed with Brief Summary: None    HPI:  Susan Singh is a 89 y.o. female with history of hypertension, hypothyroid, A-fib on Eliquis and metoprolol presents to the ED for 2 days of shortness of breath and generalized weakness.  Patient is somewhat poor historian although she is ANO x 4.  She reports generalized weakness and passing out several times.  Believes she has fallen but denies any head injury.  Does not wear oxygen at home.    EMS placed the patient on nonrebreather for low pulse ox in the 80s.    Physical Exam   Triage vitals:  T 35.8 °C (96.4 °F)  HR (!) 139  /85  RR (!) 23  O2 100 % Supplemental oxygen (15 lpm)    General: Awake, alert, in no acute distress  Eyes: Gaze conjugate.  No scleral icterus or injection  HENT: Normo-cephalic, atraumatic. No stridor  CV: Tachycardic rate, irregular rhythm. Radial pulses 2+ bilaterally  Resp: Breathing non-labored, speaking in full sentences.  Coarse breath sounds bilaterally.  GI: Soft, non-distended, non-tender. No rebound or guarding.  : Deferred  MSK/Extremities: No gross bony deformities. Moving all extremities  Skin: Warm. Appropriate color  Neuro: Alert. Oriented. Face symmetric. Speech is fluent.  Gross strength and sensation intact in b/l UE and LEs  Psych: Appropriate mood and affect    Medical Decision Making & ED Course   Medical Decision Makin y.o. female presents to the ED for shortness of breath.  Found to be hypoxic on room air via EMS.  Was placed on nonrebreather.  Upon arrival to the ED she is in no distress.  Was transition to 4 L nasal cannula.  This is a new O2 requirement for her.  On exam she has coarse lung sounds.  Obtain broad medical workup.  EKG shows A-fib with RVR with new left bundle branch block compared to 2019.  She has no active chest pain.  X-ray with vascular  congestion with elevated BNP and troponin.  Suspect acute heart failure.  Rest of laboratory work largely unremarkable.  She was treated with IV Lasix as well as her home metoprolol.  Remained hemodynamically stable in the ED.  Admitted to hospital for further evaluation and management.  ----      Differential diagnoses considered include but are not limited to: See [facility]     Social Determinants of Health which Significantly Impact Care: None identified     EKG Independent Interpretation: EKG interpreted by myself. Please see ED Course for full interpretation.    Independent Result Review and Interpretation: Relevant laboratory and radiographic results were reviewed and independently interpreted by myself.  As necessary, they are commented on in the ED Course.    Chronic conditions affecting the patient's care: As documented above in [facility]    The patient was discussed with the following consultants/services: None    Care Considerations: As documented above in [facility]    ED Course:  ED Course as of 08/08/25 0927   Fri Aug 08, 2025   0712 Atrial fibrillation with a rapid ventricular rate at 132.  Left bundle branch block appreciated.  Otherwise no ST segment elevations or depressions.  This is new left bundle branch block from previous EKG from 2019. [SM]      ED Course User Index  [SM] Perez Villeda MD         Diagnoses as of 08/08/25 0927   Hypoxia   Acute systolic heart failure   Atrial fibrillation with rapid ventricular response (Multi)     Disposition   As a result of their workup, the patient will require admission to the hospital.  The patient was informed of her diagnosis.  The patient was given the opportunity to ask questions and I answered them. The patient agreed to be admitted to the hospital.    Procedures   Procedures    Patient was seen independently    Perez Villeda MD  Emergency Medicine     Perez Villeda MD  08/08/25 0928

## 2025-08-08 NOTE — PROGRESS NOTES
Physical Therapy                 Therapy Communication Note    Patient Name: Susan Singh  MRN: [id number]  Department: [address]  Room: [address] [place], [place] [address]  Today's Date: 8/8/2025     Discipline: Physical Therapy    Missed Visit: PT Missed Visit: Yes     Missed Visit Reason: Missed Visit Reason: Other (Comment) (Pt in room, provider in room completing pt care. Pt unavailable for PT evaluation at this time.)    Missed Time: Attempt 1407

## 2025-08-08 NOTE — CARE PLAN
The patient's goals for the shift include      The clinical goals for the shift include Patient will have vital signs WNL    0955 Pt arrived from ED in stable condition- bed alarm on, call light within reach and no needs at this time

## 2025-08-08 NOTE — CONSULTS
Inpatient consult to Cardiology  Consult performed by: JEN Kaplan-CNP  Consult ordered by: Bryon Beltran MD  Reason for consult: AF RVR          History Of Present Illness  Susan Singh is a 89 y.o. female presenting with complaints of intermittent shortness of breath for a few days. She denies any chest pain, edema, or palpitations. She does report some fatigue and dizziness at times.     Lab work in the ER showed glucose 114, sodium 138, potassium 4.6, BUN/creatinine 33/1.32, magnesium 2.33, , troponin 121, TSH 0.03, WBCs 5.0, H&H 13/40.9, CT of the head negative, chest x-ray showed cardiomegaly with basilar edema and moderate-sized bilateral effusions.    EKG showed AF RVR, LBBB, new since 2019. /85.    She was given Lasix IV in the ER.       Past Medical History  Medical History[1]    Surgical History  Surgical History[2]     Social History  She reports that she has never smoked. She has never used smokeless tobacco. She reports that she does not drink alcohol and does not use drugs.    Family History  Family History[3]     Allergies  Chlorthalidone, Ciprofloxacin, and Hydrochlorothiazide    Review of Systems  Review of Systems   Constitutional:  Negative for chills and fever.   Respiratory:  Positive for shortness of breath. Negative for cough.    Cardiovascular:  Negative for chest pain, palpitations and leg swelling.   Gastrointestinal:  Negative for abdominal distention and abdominal pain.   Neurological:  Negative for dizziness and weakness.   All other systems reviewed and are negative.         Physical Exam  Constitutional: Well developed, awake/alert/oriented x3, no distress, alert and cooperative  Eyes: PERRL, EOMI, clear sclera  ENMT: mucous membranes moist, no apparent injury, no lesions seen  Head/Neck: Neck supple, no apparent injury, thyroid without mass or tenderness, No JVD, trachea midline, no bruits  Respiratory/Thorax: Patent airways, CTAB, normal breath sounds with  "good chest expansion, thorax symmetric  Cardiovascular: tachycardia, irregular rhythm, no murmurs, 2+ equal pulses of the extremities, normal S 1and S 2  Gastrointestinal: Nondistended, soft, non-tender, no rebound tenderness or guarding, no masses palpable, no organomegaly, +BS, no bruits  Musculoskeletal: ROM intact, no joint swelling, normal strength  Extremities: normal extremities, no cyanosis edema, contusions or wounds, no clubbing  Neurological: alert and oriented x3, intact senses, motor, response and reflexes, normal strength  Lymphatic: No significant lymphadenopathy  Psychological: Appropriate mood and behavior  Skin: Warm and dry, no lesions, no rashes       Last Recorded Vitals  Blood pressure 139/83, pulse (!) 130, temperature 35.8 °C (96.4 °F), temperature source Temporal, resp. rate (!) 22, height 1.626 m (5' 4\"), weight 59 kg (130 lb), SpO2 90%.    Medications  Scheduled medications  Scheduled Medications[4]  Continuous medications  Continuous Medications[5]  PRN medications  PRN Medications[6]    Relevant Results  Results for orders placed or performed during the hospital encounter of 08/08/25 (from the past 24 hours)   ECG 12 lead   Result Value Ref Range    Ventricular Rate 132 BPM    QRS Duration 136 ms    QT Interval 346 ms    QTC Calculation(Bazett) 512 ms    R Axis -25 degrees    T Axis 143 degrees    QRS Count 22 beats    Q Onset 217 ms    T Offset 390 ms    QTC Fredericia 450 ms   CBC and Auto Differential   Result Value Ref Range    WBC 5.0 4.4 - 11.3 x10*3/uL    nRBC 0.0 0.0 - 0.0 /100 WBCs    RBC 4.54 4.00 - 5.20 x10*6/uL    Hemoglobin 13.0 12.0 - 16.0 g/dL    Hematocrit 40.9 36.0 - 46.0 %    MCV 90 80 - 100 fL    MCH 28.6 26.0 - 34.0 pg    MCHC 31.8 (L) 32.0 - 36.0 g/dL    RDW 15.0 (H) 11.5 - 14.5 %    Platelets 239 150 - 450 x10*3/uL    Neutrophils % 59.5 40.0 - 80.0 %    Immature Granulocytes %, Automated 0.2 0.0 - 0.9 %    Lymphocytes % 27.8 13.0 - 44.0 %    Monocytes % 10.3 2.0 - " 10.0 %    Eosinophils % 1.4 0.0 - 6.0 %    Basophils % 0.8 0.0 - 2.0 %    Neutrophils Absolute 2.99 1.60 - 5.50 x10*3/uL    Immature Granulocytes Absolute, Automated 0.01 0.00 - 0.50 x10*3/uL    Lymphocytes Absolute 1.40 0.80 - 3.00 x10*3/uL    Monocytes Absolute 0.52 0.05 - 0.80 x10*3/uL    Eosinophils Absolute 0.07 0.00 - 0.40 x10*3/uL    Basophils Absolute 0.04 0.00 - 0.10 x10*3/uL   Comprehensive metabolic panel   Result Value Ref Range    Glucose 114 (H) 74 - 99 mg/dL    Sodium 138 136 - 145 mmol/L    Potassium 4.6 3.5 - 5.3 mmol/L    Chloride 104 98 - 107 mmol/L    Bicarbonate 23 21 - 32 mmol/L    Anion Gap 16 10 - 20 mmol/L    Urea Nitrogen 33 (H) 6 - 23 mg/dL    Creatinine 1.32 (H) 0.50 - 1.05 mg/dL    eGFR 37 (L) >60 mL/min/1.73m*2    Calcium 8.9 8.6 - 10.3 mg/dL    Albumin 3.7 3.4 - 5.0 g/dL    Alkaline Phosphatase 94 33 - 136 U/L    Total Protein 6.7 6.4 - 8.2 g/dL    AST 25 9 - 39 U/L    Bilirubin, Total 0.9 0.0 - 1.2 mg/dL    ALT 24 7 - 45 U/L   Magnesium   Result Value Ref Range    Magnesium 2.33 1.60 - 2.40 mg/dL   BLOOD GAS VENOUS FULL PANEL   Result Value Ref Range    POCT pH, Venous 7.41 7.33 - 7.43 pH    POCT pCO2, Venous 36 (L) 41 - 51 mm Hg    POCT pO2, Venous 40 35 - 45 mm Hg    POCT SO2, Venous 69 45 - 75 %    POCT Oxy Hemoglobin, Venous 67.2 45.0 - 75.0 %    POCT Hematocrit Calculated, Venous 40.0 36.0 - 46.0 %    POCT Sodium, Venous 134 (L) 136 - 145 mmol/L    POCT Potassium, Venous 4.2 3.5 - 5.3 mmol/L    POCT Chloride, Venous 106 98 - 107 mmol/L    POCT Ionized Calicum, Venous 1.14 1.10 - 1.33 mmol/L    POCT Glucose, Venous 127 (H) 74 - 99 mg/dL    POCT Lactate, Venous 2.3 (H) 0.4 - 2.0 mmol/L    POCT Base Excess, Venous -1.4 -2.0 - 3.0 mmol/L    POCT HCO3 Calculated, Venous 22.8 22.0 - 26.0 mmol/L    POCT Hemoglobin, Venous 13.4 12.0 - 16.0 g/dL    POCT Anion Gap, Venous 9.0 (L) 10.0 - 25.0 mmol/L    Patient Temperature      FiO2 100 %   TSH with reflex to Free T4 if abnormal   Result  Value Ref Range    Thyroid Stimulating Hormone 0.03 (L) 0.44 - 3.98 mIU/L   B-type natriuretic peptide   Result Value Ref Range     (H) 0 - 99 pg/mL   Troponin I, High Sensitivity   Result Value Ref Range    Troponin I, High Sensitivity 121 (HH) 0 - 13 ng/L   Thyroxine, Free   Result Value Ref Range    Thyroxine, Free 2.29 (H) 0.61 - 1.12 ng/dL   Sars-CoV-2 and Influenza A/B PCR   Result Value Ref Range    Flu A Result Not Detected Not Detected    Flu B Result Not Detected Not Detected    Coronavirus 2019, PCR Not Detected Not Detected   POCT GLUCOSE   Result Value Ref Range    POCT Glucose 128 (H) 74 - 99 mg/dL       XR chest 1 view   Final Result   Cardiomegaly with basilar edema and moderate-sized bilateral   effusions.        Signed by: Leobardo Leal 8/8/2025 7:35 AM   Dictation workstation:   [id number]      CT head wo IV contrast   Final Result   NO ACUTE INTRACRANIAL PROCESS. SKULL INTACT        MACRO:   None        Signed by: Ranjith Prado 8/8/2025 7:36 AM   Dictation workstation:   [id number]      Transthoracic Echo Complete    (Results Pending)       No echocardiogram results found for the past 12 months       Assessment/Plan   Echocardiogram from 2019: LVEF 55-60%      Paroxysmal atrial fibrillation with RVR  -EKG reviewed AF RVR, LBBB  -Give metoprolol 5mg IV x1 now, can repeat dose x3 if BP ok  -Cont metoprolol tartrate 100mg BID  -Cont eliquis  -TSH 0.03  -Check echo  -Monitor on tele    2. Elevated troponins-Non MI elevation  -Denies chest pain  -EKG showed AF RVR, LBBB->new since 2019  -Check echo  -Not a candidate for invasive cardiac procedures due to age  -Cont metoprolol and statin    3. Acute CHF, unsure if systolic/diastolic or both  -  -CXR showed cardiomegaly with basilar edema and moderate sized bilateral effusions  -I reviewed the Echocardiogram from 2019 as above  -repeat echo  -Strict I & Os  -Daily weights  -2gm na diet  -Given Lasix IV x1 in ER  -Cont Lasix IV     4. Acute  kidney injury  -Most likely cardiorenal  -Hold losartan  -Monitor    5. Hypertension  -stable  -2gm na diet  -cont meds  -monitor    6. Hyperlipidemia  -Cont statin    7. Hypothyroidism  -TSH 0.03  -Hold synthroid      JEN Kaplan-CNP         [1]   Past Medical History:  Diagnosis Date    Atrial fibrillation (Multi)     Dysuria 07/24/2014    Burning with urination    Encounter for immunization 11/16/2015    Immunization due    Hyperlipidemia     Hypertension     Hypo-osmolality and hyponatremia     Hyponatremia    Other conditions influencing health status     History of vaginal delivery   [2]   Past Surgical History:  Procedure Laterality Date    CATARACT EXTRACTION  05/21/2013    Cataract Surgery    COLONOSCOPY  02/18/2015    Complete Colonoscopy    DILATION AND CURETTAGE OF UTERUS  05/21/2013    Dilation And Curettage    TOTAL HIP ARTHROPLASTY  11/20/2013    Total Hip Replacement   [3] No family history on file.  [4] [Held by provider] amLODIPine, 5 mg, oral, Daily  apixaban, 2.5 mg, oral, BID  furosemide, 60 mg, intravenous, Once  [Held by provider] levothyroxine, 100 mcg, oral, Daily  [Held by provider] losartan, 100 mg, oral, Daily  metoprolol, 5 mg, intravenous, Once  metoprolol tartrate, 100 mg, oral, BID  pravastatin, 40 mg, oral, Daily     [5]    [6] PRN medications: famotidine, melatonin, ondansetron, oxygen, sennosides-docusate sodium

## 2025-08-09 ENCOUNTER — APPOINTMENT (OUTPATIENT)
Dept: CARDIOLOGY | Facility: HOSPITAL | Age: OVER 89
DRG: 291 | End: 2025-08-09
Payer: MEDICARE

## 2025-08-09 LAB
ANION GAP BLDA CALCULATED.4IONS-SCNC: 9 MMO/L (ref 10–25)
ANION GAP SERPL CALC-SCNC: 16 MMOL/L (ref 10–20)
AORTIC VALVE MEAN GRADIENT: 2 MMHG
AORTIC VALVE PEAK VELOCITY: 0.88 M/S
AV PEAK GRADIENT: 3 MMHG
AVA (PEAK VEL): 1.84 CM2
AVA (VTI): 1.89 CM2
BASE EXCESS BLDA CALC-SCNC: 2.2 MMOL/L (ref -2–3)
BODY TEMPERATURE: ABNORMAL
BUN SERPL-MCNC: 35 MG/DL (ref 6–23)
CA-I BLDA-SCNC: 1.06 MMOL/L (ref 1.1–1.33)
CALCIUM SERPL-MCNC: 8.7 MG/DL (ref 8.6–10.3)
CARDIAC TROPONIN I PNL SERPL HS: 369 NG/L (ref 0–13)
CARDIAC TROPONIN I PNL SERPL HS: 465 NG/L (ref 0–13)
CHLORIDE BLDA-SCNC: 103 MMOL/L (ref 98–107)
CHLORIDE SERPL-SCNC: 104 MMOL/L (ref 98–107)
CO2 SERPL-SCNC: 24 MMOL/L (ref 21–32)
CREAT SERPL-MCNC: 1.35 MG/DL (ref 0.5–1.05)
EGFRCR SERPLBLD CKD-EPI 2021: 36 ML/MIN/1.73M*2
EJECTION FRACTION APICAL 4 CHAMBER: 32.2
EJECTION FRACTION: 15 %
ERYTHROCYTE [DISTWIDTH] IN BLOOD BY AUTOMATED COUNT: 14.9 % (ref 11.5–14.5)
GLUCOSE BLDA-MCNC: 165 MG/DL (ref 74–99)
GLUCOSE SERPL-MCNC: 111 MG/DL (ref 74–99)
HCO3 BLDA-SCNC: 24.7 MMOL/L (ref 22–26)
HCT VFR BLD AUTO: 37.2 % (ref 36–46)
HCT VFR BLD EST: 37 % (ref 36–46)
HGB BLD-MCNC: 12.6 G/DL (ref 12–16)
HGB BLDA-MCNC: 12.4 G/DL (ref 12–16)
INHALED O2 CONCENTRATION: 100 %
LACTATE BLDA-SCNC: 1.3 MMOL/L (ref 0.4–2)
LEFT ATRIUM VOLUME AREA LENGTH INDEX BSA: 22.8 ML/M2
LEFT VENTRICLE INTERNAL DIMENSION DIASTOLE: 3.35 CM (ref 3.5–6)
LEFT VENTRICULAR OUTFLOW TRACT DIAMETER: 1.85 CM
MAGNESIUM SERPL-MCNC: 2.3 MG/DL (ref 1.6–2.4)
MCH RBC QN AUTO: 28.4 PG (ref 26–34)
MCHC RBC AUTO-ENTMCNC: 33.9 G/DL (ref 32–36)
MCV RBC AUTO: 84 FL (ref 80–100)
MITRAL VALVE E/A RATIO: 2.13
NRBC BLD-RTO: 0 /100 WBCS (ref 0–0)
OXYHGB MFR BLDA: 91 % (ref 94–98)
PCO2 BLDA: 31 MM HG (ref 38–42)
PH BLDA: 7.51 PH (ref 7.38–7.42)
PLATELET # BLD AUTO: 212 X10*3/UL (ref 150–450)
PO2 BLDA: 62 MM HG (ref 85–95)
POTASSIUM BLDA-SCNC: 3.8 MMOL/L (ref 3.5–5.3)
POTASSIUM SERPL-SCNC: 3.9 MMOL/L (ref 3.5–5.3)
RBC # BLD AUTO: 4.43 X10*6/UL (ref 4–5.2)
RIGHT VENTRICLE FREE WALL PEAK S': 6 CM/S
RIGHT VENTRICLE PEAK SYSTOLIC PRESSURE: 38 MMHG
SAO2 % BLDA: 93 % (ref 94–100)
SODIUM BLDA-SCNC: 133 MMOL/L (ref 136–145)
SODIUM SERPL-SCNC: 140 MMOL/L (ref 136–145)
TRICUSPID ANNULAR PLANE SYSTOLIC EXCURSION: 0.9 CM
WBC # BLD AUTO: 6.9 X10*3/UL (ref 4.4–11.3)

## 2025-08-09 PROCEDURE — 85027 COMPLETE CBC AUTOMATED: CPT | Performed by: STUDENT IN AN ORGANIZED HEALTH CARE EDUCATION/TRAINING PROGRAM

## 2025-08-09 PROCEDURE — 93005 ELECTROCARDIOGRAM TRACING: CPT

## 2025-08-09 PROCEDURE — 99233 SBSQ HOSP IP/OBS HIGH 50: CPT | Performed by: STUDENT IN AN ORGANIZED HEALTH CARE EDUCATION/TRAINING PROGRAM

## 2025-08-09 PROCEDURE — 36415 COLL VENOUS BLD VENIPUNCTURE: CPT | Performed by: STUDENT IN AN ORGANIZED HEALTH CARE EDUCATION/TRAINING PROGRAM

## 2025-08-09 PROCEDURE — 2500000001 HC RX 250 WO HCPCS SELF ADMINISTERED DRUGS (ALT 637 FOR MEDICARE OP): Performed by: STUDENT IN AN ORGANIZED HEALTH CARE EDUCATION/TRAINING PROGRAM

## 2025-08-09 PROCEDURE — 80048 BASIC METABOLIC PNL TOTAL CA: CPT | Performed by: STUDENT IN AN ORGANIZED HEALTH CARE EDUCATION/TRAINING PROGRAM

## 2025-08-09 PROCEDURE — 94760 N-INVAS EAR/PLS OXIMETRY 1: CPT

## 2025-08-09 PROCEDURE — 2500000004 HC RX 250 GENERAL PHARMACY W/ HCPCS (ALT 636 FOR OP/ED): Performed by: INTERNAL MEDICINE

## 2025-08-09 PROCEDURE — 83735 ASSAY OF MAGNESIUM: CPT | Performed by: STUDENT IN AN ORGANIZED HEALTH CARE EDUCATION/TRAINING PROGRAM

## 2025-08-09 PROCEDURE — 36600 WITHDRAWAL OF ARTERIAL BLOOD: CPT

## 2025-08-09 PROCEDURE — 5A0935A ASSISTANCE WITH RESPIRATORY VENTILATION, LESS THAN 24 CONSECUTIVE HOURS, HIGH NASAL FLOW/VELOCITY: ICD-10-PCS | Performed by: FAMILY MEDICINE

## 2025-08-09 PROCEDURE — 2500000002 HC RX 250 W HCPCS SELF ADMINISTERED DRUGS (ALT 637 FOR MEDICARE OP, ALT 636 FOR OP/ED): Performed by: NURSE PRACTITIONER

## 2025-08-09 PROCEDURE — 84484 ASSAY OF TROPONIN QUANT: CPT | Performed by: STUDENT IN AN ORGANIZED HEALTH CARE EDUCATION/TRAINING PROGRAM

## 2025-08-09 PROCEDURE — 2500000004 HC RX 250 GENERAL PHARMACY W/ HCPCS (ALT 636 FOR OP/ED)

## 2025-08-09 PROCEDURE — 36415 COLL VENOUS BLD VENIPUNCTURE: CPT

## 2025-08-09 PROCEDURE — 2500000004 HC RX 250 GENERAL PHARMACY W/ HCPCS (ALT 636 FOR OP/ED): Performed by: STUDENT IN AN ORGANIZED HEALTH CARE EDUCATION/TRAINING PROGRAM

## 2025-08-09 PROCEDURE — 84132 ASSAY OF SERUM POTASSIUM: CPT

## 2025-08-09 PROCEDURE — 84484 ASSAY OF TROPONIN QUANT: CPT

## 2025-08-09 PROCEDURE — 2060000001 HC INTERMEDIATE ICU ROOM DAILY

## 2025-08-09 RX ORDER — METOPROLOL SUCCINATE 50 MG/1
100 TABLET, EXTENDED RELEASE ORAL DAILY
Status: DISCONTINUED | OUTPATIENT
Start: 2025-08-09 | End: 2025-08-09

## 2025-08-09 RX ORDER — METOPROLOL TARTRATE 1 MG/ML
5 INJECTION, SOLUTION INTRAVENOUS ONCE
Status: COMPLETED | OUTPATIENT
Start: 2025-08-09 | End: 2025-08-09

## 2025-08-09 RX ORDER — POLYETHYLENE GLYCOL 3350 17 G/17G
17 POWDER, FOR SOLUTION ORAL DAILY
Status: DISPENSED | OUTPATIENT
Start: 2025-08-09

## 2025-08-09 RX ORDER — FUROSEMIDE 10 MG/ML
80 INJECTION INTRAMUSCULAR; INTRAVENOUS ONCE
Status: COMPLETED | OUTPATIENT
Start: 2025-08-09 | End: 2025-08-09

## 2025-08-09 RX ORDER — AMOXICILLIN 250 MG
2 CAPSULE ORAL 2 TIMES DAILY
Status: DISPENSED | OUTPATIENT
Start: 2025-08-09

## 2025-08-09 RX ORDER — METOPROLOL SUCCINATE 50 MG/1
100 TABLET, EXTENDED RELEASE ORAL 2 TIMES DAILY
Status: DISCONTINUED | OUTPATIENT
Start: 2025-08-09 | End: 2025-08-10

## 2025-08-09 RX ADMIN — POLYETHYLENE GLYCOL 3350 17 G: 17 POWDER, FOR SOLUTION ORAL at 10:18

## 2025-08-09 RX ADMIN — APIXABAN 2.5 MG: 2.5 TABLET, FILM COATED ORAL at 21:05

## 2025-08-09 RX ADMIN — PRAVASTATIN SODIUM 40 MG: 40 TABLET ORAL at 10:14

## 2025-08-09 RX ADMIN — Medication: at 09:47

## 2025-08-09 RX ADMIN — Medication: at 23:00

## 2025-08-09 RX ADMIN — FUROSEMIDE 80 MG: 10 INJECTION, SOLUTION INTRAMUSCULAR; INTRAVENOUS at 14:53

## 2025-08-09 RX ADMIN — METOPROLOL TARTRATE 5 MG: 1 INJECTION, SOLUTION INTRAVENOUS at 07:14

## 2025-08-09 RX ADMIN — SENNOSIDES AND DOCUSATE SODIUM 2 TABLET: 50; 8.6 TABLET ORAL at 10:18

## 2025-08-09 RX ADMIN — AMIODARONE HYDROCHLORIDE 1 MG/MIN: 1.8 INJECTION, SOLUTION INTRAVENOUS at 11:41

## 2025-08-09 RX ADMIN — APIXABAN 2.5 MG: 2.5 TABLET, FILM COATED ORAL at 10:16

## 2025-08-09 RX ADMIN — AMIODARONE HYDROCHLORIDE 0.5 MG/MIN: 1.8 INJECTION, SOLUTION INTRAVENOUS at 17:51

## 2025-08-09 RX ADMIN — AMIODARONE HYDROCHLORIDE 150 MG: 1.5 INJECTION, SOLUTION INTRAVENOUS at 11:33

## 2025-08-09 RX ADMIN — METOPROLOL TARTRATE 100 MG: 50 TABLET, FILM COATED ORAL at 10:24

## 2025-08-09 RX ADMIN — AMIODARONE HYDROCHLORIDE 400 MG: 200 TABLET ORAL at 10:14

## 2025-08-09 RX ADMIN — Medication: at 19:26

## 2025-08-09 RX ADMIN — Medication: at 17:34

## 2025-08-09 ASSESSMENT — COGNITIVE AND FUNCTIONAL STATUS - GENERAL
TURNING FROM BACK TO SIDE WHILE IN FLAT BAD: A LITTLE
MOVING TO AND FROM BED TO CHAIR: A LITTLE
CLIMB 3 TO 5 STEPS WITH RAILING: A LITTLE
TOILETING: A LITTLE
STANDING UP FROM CHAIR USING ARMS: A LITTLE
HELP NEEDED FOR BATHING: A LITTLE
MOBILITY SCORE: 18
PERSONAL GROOMING: A LITTLE
DAILY ACTIVITIY SCORE: 19
DRESSING REGULAR LOWER BODY CLOTHING: A LITTLE
WALKING IN HOSPITAL ROOM: A LITTLE
MOVING FROM LYING ON BACK TO SITTING ON SIDE OF FLAT BED WITH BEDRAILS: A LITTLE
DRESSING REGULAR UPPER BODY CLOTHING: A LITTLE

## 2025-08-09 ASSESSMENT — PAIN SCALES - GENERAL
PAINLEVEL_OUTOF10: 0 - NO PAIN
PAINLEVEL_OUTOF10: 0 - NO PAIN

## 2025-08-09 ASSESSMENT — PAIN - FUNCTIONAL ASSESSMENT
PAIN_FUNCTIONAL_ASSESSMENT: 0-10
PAIN_FUNCTIONAL_ASSESSMENT: 0-10

## 2025-08-09 ASSESSMENT — ACTIVITIES OF DAILY LIVING (ADL): LACK_OF_TRANSPORTATION: NO

## 2025-08-09 NOTE — CARE PLAN
The patient's goals for the shift include      The clinical goals for the shift include Pt will remain HDS and safe this shift    Over the shift, the patient did make progress toward the following goals. Pt monitored and medicated as ordered this shift. Pt remained HDS this shift.

## 2025-08-09 NOTE — PROGRESS NOTES
Physical Therapy                 Therapy Communication Note    Patient Name: Susan Singh  MRN: [id number]  Department: [address]  Room: [address] [place] [address]  Today's Date: 8/9/2025     Discipline: Physical Therapy    Missed Visit: PT Missed Visit: Yes     Missed Visit Reason: Missed Visit Reason: Patient placed on medical hold (Communicated with bedside RN.  Pt with unstable HR and increased SOB (pt requring 12 L O2).  Pt transferring to 2W, step down unit, for further management.  Hold therapy evaluations until pt more clinically apropriate.  No PT eval completed at this time.)    Missed Time: Attempt    Comment:  Attempt at 8:24 am

## 2025-08-09 NOTE — PROGRESS NOTES
Susan Singh is a 89 y.o. female on day 1 of admission presenting with Acute systolic heart failure.      Subjective     Susan was sitting up, assisted her up in bed and was eating lunch. Does complain of shortness of breath, currently on high flow NC. Currenlty on atrial fibrillation with Rvr on monitor. Denies having any palpitations.     Review of systems:  Constitutional: negative for fever, chills, or malaise  Neuro: negative for dizziness, headache, numbness, tingling  ENT: Negative for nasal congestion or sore throat  Resp: positive for shortness of breath, cough, or wheezing  CV: negative for chest pain, palpitations  GI: negative for abd pain, nausea, vomiting or diarrhea  : negative for dysuria, frequency, or urgency  Skin: negative for lesions, wounds, or rash  Musculoskeletal: Negative for weakness, myalgia, or arthralgia  Endocrine: Negative for polyuria or polydipsia      Objective   Constitutional: Well developed, awake/alert/oriented x3, no distress, alert and cooperative  Eyes: PERRL, EOMI, clear sclera  ENMT: mucous membranes moist, no apparent injury, no lesions seen  Head/Neck: Neck supple, no apparent injury, thyroid without mass or tenderness, No JVD, trachea midline, no bruits  Respiratory/Thorax: Patent airways, CTAB, normal breath sounds with good chest expansion, thorax symmetric  Cardiovascular: Irregular, rate and rhythm, no murmurs, 2+ equal pulses of the extremities, normal S 1and S 2  Gastrointestinal: Nondistended, soft, non-tender, no rebound tenderness or guarding, no masses palpable, no organomegaly, +BS, no bruits  Musculoskeletal: ROM intact, no joint swelling, normal strength  Extremities: normal extremities, no cyanosis edema, contusions or wounds, no clubbing  Neurological: alert and oriented x3, intact senses, motor, response and reflexes, normal strength  Lymphatic: No significant lymphadenopathy  Psychological: Appropriate mood and behavior  Skin: Warm and dry, no  "lesions, no rashes      Last Recorded Vitals  /75 (BP Location: Left arm, Patient Position: Lying)   Pulse 97   Temp 36.1 °C (97 °F) (Temporal)   Resp 19   Ht 1.626 m (5' 4\")   Wt 59 kg (130 lb)   SpO2 90%   BMI 22.31 kg/m²     Intake/Output last 3 Shifts:  I/O last 3 completed shifts:  In: 179 (3 mL/kg) [P.O.:179]  Out: 300 (5.1 mL/kg) [Urine:300 (0.1 mL/kg/hr)]  Weight: 59 kg   I/O this shift:  In: 220 [P.O.:120; I.V.:100]  Out: 300 [Urine:300]    Relevant Results  Scheduled medications  Scheduled Medications[1]  Continuous medications  Continuous Medications[2]  PRN medications  PRN Medications[3]    Results for orders placed or performed during the hospital encounter of 08/08/25 (from the past 24 hours)   Transthoracic Echo Complete   Result Value Ref Range    BSA 1.63 m2   CBC   Result Value Ref Range    WBC 6.9 4.4 - 11.3 x10*3/uL    nRBC 0.0 0.0 - 0.0 /100 WBCs    RBC 4.43 4.00 - 5.20 x10*6/uL    Hemoglobin 12.6 12.0 - 16.0 g/dL    Hematocrit 37.2 36.0 - 46.0 %    MCV 84 80 - 100 fL    MCH 28.4 26.0 - 34.0 pg    MCHC 33.9 32.0 - 36.0 g/dL    RDW 14.9 (H) 11.5 - 14.5 %    Platelets 212 150 - 450 x10*3/uL   Basic Metabolic Panel   Result Value Ref Range    Glucose 111 (H) 74 - 99 mg/dL    Sodium 140 136 - 145 mmol/L    Potassium 3.9 3.5 - 5.3 mmol/L    Chloride 104 98 - 107 mmol/L    Bicarbonate 24 21 - 32 mmol/L    Anion Gap 16 10 - 20 mmol/L    Urea Nitrogen 35 (H) 6 - 23 mg/dL    Creatinine 1.35 (H) 0.50 - 1.05 mg/dL    eGFR 36 (L) >60 mL/min/1.73m*2    Calcium 8.7 8.6 - 10.3 mg/dL   Magnesium   Result Value Ref Range    Magnesium 2.30 1.60 - 2.40 mg/dL   Troponin I, High Sensitivity   Result Value Ref Range    Troponin I, High Sensitivity 465 (HH) 0 - 13 ng/L   Troponin I, High Sensitivity   Result Value Ref Range    Troponin I, High Sensitivity 369 (HH) 0 - 13 ng/L       Transthoracic Echo Complete         XR chest 1 view   Final Result   Cardiomegaly with basilar edema and moderate-sized " bilateral   effusions.        Signed by: Leobardo Leal 8/8/2025 7:35 AM   Dictation workstation:   [id number]      CT head wo IV contrast   Final Result   NO ACUTE INTRACRANIAL PROCESS. SKULL INTACT        MACRO:   None        Signed by: Ranjith Prado 8/8/2025 7:36 AM   Dictation workstation:   [id number]          No echocardiogram results found for the past 12 months         Assessment/Plan   Assessment & Plan  Acute systolic heart failure    Echocardiogram from 2019: LVEF 55-60%        Paroxysmal atrial fibrillation with RVR  -EKG reviewed AF RVR, LBBB  -Give metoprolol 5mg IV x1 now, can repeat dose x3 if BP ok  -Cont metoprolol tartrate 100mg BID  -Cont eliquis  -TSH 0.03  -Check echo  -Monitor on tele  - 8/9 start amiodarone gtt for a fib with rvr, change metoprolol to succinate 100 mg bid, continue to monitor on tele    2. Elevated troponins-Non MI elevation  -Denies chest pain  -EKG showed AF RVR, LBBB->new since 2019  -Check echo  -Not a candidate for invasive cardiac procedures due to age  -Cont metoprolol and statin     3. Acute CHF, unsure if systolic/diastolic or both  -  -CXR showed cardiomegaly with basilar edema and moderate sized bilateral effusions  -I reviewed the Echocardiogram from 2019 as above  -repeat echo  -Strict I & Os  -Daily weights  -2gm na diet  -Given Lasix IV x1 in ER  -Cont Lasix IV      4. Acute kidney injury  -Most likely cardiorenal  -Hold losartan  -Monitor     5. Hypertension  -stable  -2gm na diet  -cont meds  -monitor     6. Hyperlipidemia  -Cont statin     7. Hypothyroidism  -TSH 0.03  -Hold synthroid      JEN Gregory-CNP        [1] [Held by provider] amLODIPine, 5 mg, oral, Daily  apixaban, 2.5 mg, oral, BID  [Held by provider] levothyroxine, 100 mcg, oral, Daily  [Held by provider] losartan, 100 mg, oral, Daily  metoprolol succinate XL, 100 mg, oral, Daily  polyethylene glycol, 17 g, oral, Daily  pravastatin, 40 mg, oral, Daily  sennosides-docusate sodium, 2  tablet, oral, BID  [2] amiodarone, 1 mg/min, Last Rate: 1 mg/min (08/09/25 1141)   Followed by  amiodarone, 0.5 mg/min  [3] PRN medications: famotidine, melatonin, ondansetron, oxygen

## 2025-08-09 NOTE — PROGRESS NOTES
Occupational Therapy                 Therapy Communication Note    Patient Name: Susan Singh  MRN: [id number]  Department: [address]  Room: [address] [place] [address]  Today's Date: 8/9/2025     Discipline: Occupational Therapy    Missed Visit: OT Missed Visit: Yes     Missed Visit Reason: Missed Visit Reason: Patient placed on medical hold (Per RN pt with unstable HR, increased SOB, transferring to 2W for further management. Will hold OT evaluation until more clinically appropriate.)    Missed Time: Attempt @ 0865

## 2025-08-09 NOTE — PROGRESS NOTES
"    [facility]  Department of Hospital Medicine    PROGRESS NOTE    Susan Singh is a 89 y.o. female on day 1 of admission presenting with Acute systolic heart failure.    Subjective   Does not feel any better.  Not much urine output despite Lasix.  Still tachycardic       Objective     Physical Exam:  Con: awake, alert; no distress;   Eyes: conjunctiva wnl; EOMI;   ENMT: hearing intact; MMM;   MSK: ROM wnl; digits wnl;   CV: Irregular tachycardia, mildly elevated JVP; trace LE edema;   Resp: normal work of breathing on HVNI; no cyanosis; diffuse rales  Neuro: moving all extremities; no abnormal movements  Psych: oriented to situation; affect wnl;      Last Recorded Vitals:  /65 (BP Location: Left arm, Patient Position: Lying)   Pulse 102   Temp 36.4 °C (97.5 °F) (Temporal)   Resp 21   Ht 1.626 m (5' 4\")   Wt 59 kg (130 lb)   SpO2 92%   BMI 22.31 kg/m²      Scheduled medications:  Scheduled Medications[1]  Continuous medications:  Continuous Medications[2]  PRN medications:  PRN Medications[3]     Relevant Results:  Lab Results   Component Value Date    WBC 6.9 08/09/2025    HGB 12.6 08/09/2025    HCT 37.2 08/09/2025    MCV 84 08/09/2025     08/09/2025      Lab Results   Component Value Date    GLUCOSE 111 (H) 08/09/2025    CALCIUM 8.7 08/09/2025     08/09/2025    K 3.9 08/09/2025    CO2 24 08/09/2025     08/09/2025    BUN 35 (H) 08/09/2025    CREATININE 1.35 (H) 08/09/2025                       I personally reviewed all pertinent labwork, imaging, and vital signs, as well as nursing, therapy, discharge planning, and consult notes.  Discussed with cardiology.     Assessment/Plan   Assessment & Plan  Acute systolic heart failure    Susan Singh is a 89 y.o. female with atrial fibrillation on Eliquis, hypertension, hyperlipidemia, hypothyroidism.  She presented with 2 days of worsening shortness of breath in the setting of 4 to 5 months of fatigue, " lightheadedness, and intermittent syncope.  In the ED, she was found to be in atrial fibrillation with RVR to the 130s and hypoxic requiring 4 L O2.  Labs showed PEPE, elevated BNP, elevated troponin, and low TSH with elevated T4.  Chest x-ray showed cardiomegaly with basilar edema and moderate bilateral effusions.  POCUS in the ED suggested low EF.      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  PEPE: Likely cardiorenal due to above; tolerating Lasix so far; suspect actual GFR is much lower given low muscle mass, requiring higher dose of Lasix  Acute hypoxic respiratory failure: Due to above; worsened requiring transfer to stepdown unit and Airvo  Hyperlipidemia:  Lightheadedness with intermittent syncope:  Elevated troponin: Likely non-MI troponin elevation due to above; down trended with better rate control  - IV diuresis; increased to 80 mg IV Lasix due to inadequate urinary output with lower doses  - strict I/Os, daily weights, telemetry  - trend BMP, Mg; maintain K>4, Mg>2, troponin  - TTE  - Continue metoprolol, pravastatin, apixaban  - Hold amlodipine, losartan; reintroduce as tolerated  - wean O2  - Cardiology consulted; started amiodarone infusion     Hypothyroidism with thyrotoxicosis: Potential trigger for above  -Hold levothyroxine for now; decrease dose on discharge     DVT PPx: Apixaban     Dispo: Inpatient, likely 3-4 midnights pending clinical improvement.  PT/OT consulted.            Bryon Beltran MD    Patient Name:  Susan Singh   MRN:   [id number]   Room/Bed:  251/[facility]        [1] [Held by provider] amLODIPine, 5 mg, oral, Daily  apixaban, 2.5 mg, oral, BID  [Held by provider] levothyroxine, 100 mcg, oral, Daily  [Held by provider] losartan, 100 mg, oral, Daily  metoprolol succinate XL, 100 mg, oral, BID  polyethylene glycol, 17 g, oral, Daily  pravastatin, 40 mg, oral,  Daily  sennosides-docusate sodium, 2 tablet, oral, BID    [2] amiodarone, 0.5 mg/min, Last Rate: 0.5 mg/min (08/09/25 1751)    [3] PRN medications: famotidine, melatonin, ondansetron, oxygen

## 2025-08-09 NOTE — PROGRESS NOTES
08/09/25 0811   Discharge Planning   Living Arrangements Family members  (Home with brother Kale)   Support Systems Family members   Assistance Needed A&OX4; dependent on assist as of recent for ADLs with walker; doesn't drive; room air baseline-currently 12L NC/denies CPAP; PCP Dr ABDELRAHMAN Santacruz/denies current home care   Type of Residence Private residence   Number of Stairs to Enter Residence 5   Number of Stairs Within Residence 12  (12 to basement)   Do you have animals or pets at home? No   Who is requesting discharge planning? Provider   Expected Discharge Disposition Home H  (DC dispo is pending workup. Pt agreeable to new home care if needed. Pending PT OT. If snf needed, no precert but 3 inpatient midnights would be needed)   Does the patient need discharge transport arranged? Yes   [facility] coordination needed? Yes   Has discharge transport been arranged? No   Financial Resource Strain   How hard is it for you to pay for the very basics like food, housing, medical care, and heating? Not hard   Housing Stability   In the last 12 months, was there a time when you were not able to pay the mortgage or rent on time? N   In the past 12 months, how many times have you moved where you were living? 0   At any time in the past 12 months, were you homeless or living in a shelter (including now)? N   Transportation Needs   In the past 12 months, has lack of transportation kept you from medical appointments or from getting medications? no   In the past 12 months, has lack of transportation kept you from meetings, work, or from getting things needed for daily living? No

## 2025-08-10 ENCOUNTER — APPOINTMENT (OUTPATIENT)
Dept: RADIOLOGY | Facility: HOSPITAL | Age: OVER 89
DRG: 291 | End: 2025-08-10
Payer: MEDICARE

## 2025-08-10 ENCOUNTER — APPOINTMENT (OUTPATIENT)
Dept: CARDIOLOGY | Facility: HOSPITAL | Age: OVER 89
DRG: 291 | End: 2025-08-10
Payer: MEDICARE

## 2025-08-10 VITALS
SYSTOLIC BLOOD PRESSURE: 172 MMHG | RESPIRATION RATE: 20 BRPM | TEMPERATURE: 97.2 F | HEART RATE: 72 BPM | DIASTOLIC BLOOD PRESSURE: 75 MMHG | BODY MASS INDEX: 20.18 KG/M2 | WEIGHT: 118.2 LBS | OXYGEN SATURATION: 98 % | HEIGHT: 64 IN

## 2025-08-10 LAB
ANION GAP SERPL CALC-SCNC: 15 MMOL/L (ref 10–20)
BASE EXCESS BLDA CALC-SCNC: 4.2 MMOL/L (ref -2–3)
BODY TEMPERATURE: ABNORMAL
BUN SERPL-MCNC: 38 MG/DL (ref 6–23)
CALCIUM SERPL-MCNC: 8.4 MG/DL (ref 8.6–10.3)
CHLORIDE SERPL-SCNC: 101 MMOL/L (ref 98–107)
CO2 SERPL-SCNC: 25 MMOL/L (ref 21–32)
CREAT SERPL-MCNC: 1.58 MG/DL (ref 0.5–1.05)
EGFRCR SERPLBLD CKD-EPI 2021: 30 ML/MIN/1.73M*2
ERYTHROCYTE [DISTWIDTH] IN BLOOD BY AUTOMATED COUNT: 15.1 % (ref 11.5–14.5)
GLUCOSE SERPL-MCNC: 99 MG/DL (ref 74–99)
HCO3 BLDA-SCNC: 27.3 MMOL/L (ref 22–26)
HCT VFR BLD AUTO: 39.3 % (ref 36–46)
HGB BLD-MCNC: 12.1 G/DL (ref 12–16)
INHALED O2 CONCENTRATION: 100 %
MAGNESIUM SERPL-MCNC: 2.16 MG/DL (ref 1.6–2.4)
MCH RBC QN AUTO: 28.1 PG (ref 26–34)
MCHC RBC AUTO-ENTMCNC: 30.8 G/DL (ref 32–36)
MCV RBC AUTO: 91 FL (ref 80–100)
NRBC BLD-RTO: 0 /100 WBCS (ref 0–0)
OXYHGB MFR BLDA: 85.6 % (ref 94–98)
PCO2 BLDA: 35 MM HG (ref 38–42)
PH BLDA: 7.5 PH (ref 7.38–7.42)
PLATELET # BLD AUTO: 217 X10*3/UL (ref 150–450)
PO2 BLDA: 53 MM HG (ref 85–95)
POTASSIUM SERPL-SCNC: 3.4 MMOL/L (ref 3.5–5.3)
RBC # BLD AUTO: 4.3 X10*6/UL (ref 4–5.2)
SAO2 % BLDA: 87 % (ref 94–100)
SODIUM SERPL-SCNC: 138 MMOL/L (ref 136–145)
UFH PPP CHRO-ACNC: 2 IU/ML (ref ?–1.1)
UFH PPP CHRO-ACNC: >2 IU/ML (ref ?–1.1)
WBC # BLD AUTO: 5.9 X10*3/UL (ref 4.4–11.3)

## 2025-08-10 PROCEDURE — 93005 ELECTROCARDIOGRAM TRACING: CPT

## 2025-08-10 PROCEDURE — 83735 ASSAY OF MAGNESIUM: CPT | Performed by: STUDENT IN AN ORGANIZED HEALTH CARE EDUCATION/TRAINING PROGRAM

## 2025-08-10 PROCEDURE — 80048 BASIC METABOLIC PNL TOTAL CA: CPT | Performed by: STUDENT IN AN ORGANIZED HEALTH CARE EDUCATION/TRAINING PROGRAM

## 2025-08-10 PROCEDURE — 2500000002 HC RX 250 W HCPCS SELF ADMINISTERED DRUGS (ALT 637 FOR MEDICARE OP, ALT 636 FOR OP/ED): Performed by: INTERNAL MEDICINE

## 2025-08-10 PROCEDURE — 93970 EXTREMITY STUDY: CPT | Performed by: RADIOLOGY

## 2025-08-10 PROCEDURE — 36415 COLL VENOUS BLD VENIPUNCTURE: CPT

## 2025-08-10 PROCEDURE — 71045 X-RAY EXAM CHEST 1 VIEW: CPT | Performed by: RADIOLOGY

## 2025-08-10 PROCEDURE — 78830 RP LOCLZJ TUM SPECT W/CT 1: CPT

## 2025-08-10 PROCEDURE — 78830 RP LOCLZJ TUM SPECT W/CT 1: CPT | Performed by: INTERNAL MEDICINE

## 2025-08-10 PROCEDURE — 3430000001 HC RX 343 DIAGNOSTIC RADIOPHARMACEUTICALS

## 2025-08-10 PROCEDURE — 2060000001 HC INTERMEDIATE ICU ROOM DAILY

## 2025-08-10 PROCEDURE — 82805 BLOOD GASES W/O2 SATURATION: CPT

## 2025-08-10 PROCEDURE — 71250 CT THORAX DX C-: CPT | Performed by: RADIOLOGY

## 2025-08-10 PROCEDURE — 2500000004 HC RX 250 GENERAL PHARMACY W/ HCPCS (ALT 636 FOR OP/ED): Performed by: STUDENT IN AN ORGANIZED HEALTH CARE EDUCATION/TRAINING PROGRAM

## 2025-08-10 PROCEDURE — 93010 ELECTROCARDIOGRAM REPORT: CPT | Performed by: INTERNAL MEDICINE

## 2025-08-10 PROCEDURE — 2500000005 HC RX 250 GENERAL PHARMACY W/O HCPCS

## 2025-08-10 PROCEDURE — 2500000002 HC RX 250 W HCPCS SELF ADMINISTERED DRUGS (ALT 637 FOR MEDICARE OP, ALT 636 FOR OP/ED)

## 2025-08-10 PROCEDURE — 2500000001 HC RX 250 WO HCPCS SELF ADMINISTERED DRUGS (ALT 637 FOR MEDICARE OP): Performed by: INTERNAL MEDICINE

## 2025-08-10 PROCEDURE — 85520 HEPARIN ASSAY: CPT

## 2025-08-10 PROCEDURE — 5A09357 ASSISTANCE WITH RESPIRATORY VENTILATION, LESS THAN 24 CONSECUTIVE HOURS, CONTINUOUS POSITIVE AIRWAY PRESSURE: ICD-10-PCS | Performed by: FAMILY MEDICINE

## 2025-08-10 PROCEDURE — A9540 TC99M MAA: HCPCS

## 2025-08-10 PROCEDURE — 71045 X-RAY EXAM CHEST 1 VIEW: CPT

## 2025-08-10 PROCEDURE — 82810 BLOOD GASES O2 SAT ONLY: CPT

## 2025-08-10 PROCEDURE — 94799 UNLISTED PULMONARY SVC/PX: CPT

## 2025-08-10 PROCEDURE — 94760 N-INVAS EAR/PLS OXIMETRY 1: CPT

## 2025-08-10 PROCEDURE — 85027 COMPLETE CBC AUTOMATED: CPT | Performed by: STUDENT IN AN ORGANIZED HEALTH CARE EDUCATION/TRAINING PROGRAM

## 2025-08-10 PROCEDURE — 36600 WITHDRAWAL OF ARTERIAL BLOOD: CPT

## 2025-08-10 PROCEDURE — 2500000001 HC RX 250 WO HCPCS SELF ADMINISTERED DRUGS (ALT 637 FOR MEDICARE OP): Performed by: STUDENT IN AN ORGANIZED HEALTH CARE EDUCATION/TRAINING PROGRAM

## 2025-08-10 PROCEDURE — 93970 EXTREMITY STUDY: CPT

## 2025-08-10 PROCEDURE — 99233 SBSQ HOSP IP/OBS HIGH 50: CPT

## 2025-08-10 PROCEDURE — 71250 CT THORAX DX C-: CPT

## 2025-08-10 PROCEDURE — 94660 CPAP INITIATION&MGMT: CPT

## 2025-08-10 PROCEDURE — 36415 COLL VENOUS BLD VENIPUNCTURE: CPT | Performed by: STUDENT IN AN ORGANIZED HEALTH CARE EDUCATION/TRAINING PROGRAM

## 2025-08-10 PROCEDURE — 2500000004 HC RX 250 GENERAL PHARMACY W/ HCPCS (ALT 636 FOR OP/ED)

## 2025-08-10 RX ORDER — METOPROLOL SUCCINATE 25 MG/1
25 TABLET, EXTENDED RELEASE ORAL DAILY
Status: DISPENSED | OUTPATIENT
Start: 2025-08-10

## 2025-08-10 RX ORDER — HEPARIN SODIUM 10000 [USP'U]/100ML
0-4500 INJECTION, SOLUTION INTRAVENOUS CONTINUOUS
Status: SHIPPED | OUTPATIENT
Start: 2025-08-10

## 2025-08-10 RX ORDER — POTASSIUM CHLORIDE 20 MEQ/1
40 TABLET, EXTENDED RELEASE ORAL ONCE
Status: COMPLETED | OUTPATIENT
Start: 2025-08-10 | End: 2025-08-10

## 2025-08-10 RX ORDER — AMIODARONE HYDROCHLORIDE 200 MG/1
200 TABLET ORAL DAILY
Status: DISPENSED | OUTPATIENT
Start: 2025-08-10

## 2025-08-10 RX ADMIN — METOPROLOL SUCCINATE 25 MG: 25 TABLET, EXTENDED RELEASE ORAL at 09:59

## 2025-08-10 RX ADMIN — Medication: at 04:00

## 2025-08-10 RX ADMIN — APIXABAN 2.5 MG: 2.5 TABLET, FILM COATED ORAL at 09:57

## 2025-08-10 RX ADMIN — Medication 1 DOSE: at 16:44

## 2025-08-10 RX ADMIN — PRAVASTATIN SODIUM 40 MG: 40 TABLET ORAL at 09:57

## 2025-08-10 RX ADMIN — Medication: at 07:51

## 2025-08-10 RX ADMIN — Medication 1 DOSE: at 15:20

## 2025-08-10 RX ADMIN — AMIODARONE HYDROCHLORIDE 200 MG: 200 TABLET ORAL at 09:59

## 2025-08-10 RX ADMIN — POLYETHYLENE GLYCOL 3350 17 G: 17 POWDER, FOR SOLUTION ORAL at 09:59

## 2025-08-10 RX ADMIN — POTASSIUM CHLORIDE 40 MEQ: 1500 TABLET, EXTENDED RELEASE ORAL at 09:57

## 2025-08-10 RX ADMIN — SENNOSIDES AND DOCUSATE SODIUM 2 TABLET: 50; 8.6 TABLET ORAL at 09:57

## 2025-08-10 RX ADMIN — Medication: at 21:00

## 2025-08-10 RX ADMIN — Medication: at 23:30

## 2025-08-10 RX ADMIN — Medication: at 19:34

## 2025-08-10 RX ADMIN — KIT FOR THE PREPARATION OF TECHNETIUM TC 99M ALBUMIN AGGREGATED 4.2 MILLICURIE: 2.5 INJECTION, POWDER, FOR SOLUTION INTRAVENOUS at 15:53

## 2025-08-10 RX ADMIN — FUROSEMIDE 5 MG/HR: 10 INJECTION, SOLUTION INTRAMUSCULAR; INTRAVENOUS at 15:04

## 2025-08-10 ASSESSMENT — COGNITIVE AND FUNCTIONAL STATUS - GENERAL
DRESSING REGULAR UPPER BODY CLOTHING: A LITTLE
MOBILITY SCORE: 18
MOVING TO AND FROM BED TO CHAIR: A LITTLE
TURNING FROM BACK TO SIDE WHILE IN FLAT BAD: A LITTLE
WALKING IN HOSPITAL ROOM: A LITTLE
MOVING FROM LYING ON BACK TO SITTING ON SIDE OF FLAT BED WITH BEDRAILS: A LITTLE
PERSONAL GROOMING: A LITTLE
DAILY ACTIVITIY SCORE: 19
DRESSING REGULAR UPPER BODY CLOTHING: A LITTLE
CLIMB 3 TO 5 STEPS WITH RAILING: A LITTLE
DRESSING REGULAR LOWER BODY CLOTHING: A LITTLE
MOVING TO AND FROM BED TO CHAIR: A LITTLE
STANDING UP FROM CHAIR USING ARMS: A LITTLE
CLIMB 3 TO 5 STEPS WITH RAILING: A LITTLE
WALKING IN HOSPITAL ROOM: A LITTLE
PERSONAL GROOMING: A LITTLE
STANDING UP FROM CHAIR USING ARMS: A LITTLE
HELP NEEDED FOR BATHING: A LITTLE
MOVING FROM LYING ON BACK TO SITTING ON SIDE OF FLAT BED WITH BEDRAILS: A LITTLE
DAILY ACTIVITIY SCORE: 19
HELP NEEDED FOR BATHING: A LITTLE
TOILETING: A LITTLE
TURNING FROM BACK TO SIDE WHILE IN FLAT BAD: A LITTLE
DRESSING REGULAR LOWER BODY CLOTHING: A LITTLE
MOBILITY SCORE: 18
TOILETING: A LITTLE

## 2025-08-10 ASSESSMENT — PAIN SCALES - GENERAL
PAINLEVEL_OUTOF10: 0 - NO PAIN
PAINLEVEL_OUTOF10: 0 - NO PAIN

## 2025-08-10 ASSESSMENT — PAIN - FUNCTIONAL ASSESSMENT
PAIN_FUNCTIONAL_ASSESSMENT: 0-10
PAIN_FUNCTIONAL_ASSESSMENT: 0-10

## 2025-08-10 NOTE — CARE PLAN
The patient's goals for the shift include      The clinical goals for the shift include patient remain safe during shift      Problem: Heart Failure  Goal: Improved gas exchange this shift  Outcome: Progressing     Problem: Pain - Adult  Goal: Verbalizes/displays adequate comfort level or baseline comfort level  Outcome: Progressing     Problem: Safety - Adult  Goal: Free from fall injury  Outcome: Progressing     Problem: Discharge Planning  Goal: Discharge to home or other facility with appropriate resources  Outcome: Progressing     Problem: Chronic Conditions and Co-morbidities  Goal: Patient's chronic conditions and co-morbidity symptoms are monitored and maintained or improved  Outcome: Progressing

## 2025-08-10 NOTE — SIGNIFICANT EVENT
Transported patient on bipap to VQ scan and back, no complications. Decreased FiO2 to 60%, SpO2 96%. Patient appears comfortable and in no distress

## 2025-08-10 NOTE — PROGRESS NOTES
"    [facility]  Department of Hospital Medicine    PROGRESS NOTE    Susan Singh is a 89 y.o. female on day 2 of admission presenting with Acute systolic heart failure.    Subjective   Patient seen and examined, feeling ok despite significant hypoxia.     Initially on Airvo but became maxed on Airvo with SpO2 91%, placed on BiPAP 10/5. VQ scan ordered this afternoon.         Objective     Physical Exam:  Con: awake, alert; no distress;   Eyes: conjunctiva wnl; EOMI;   ENMT: hearing intact; MMM;   MSK: ROM wnl; digits wnl;   CV: Irregular rhythm, mildly elevated JVP; trace LE edema;   Resp: normal work of breathing on HVNI; no cyanosis; diffuse rales  Neuro: moving all extremities; no abnormal movements  Psych: oriented to situation; affect wnl;      Last Recorded Vitals:  /73 (BP Location: Right arm, Patient Position: Lying)   Pulse 75   Temp 36.7 °C (98.1 °F) (Temporal)   Resp 23   Ht 1.626 m (5' 4\")   Wt 53.6 kg (118 lb 3.2 oz)   SpO2 91%   BMI 20.29 kg/m²      Scheduled medications:  Scheduled Medications[1]  Continuous medications:  Continuous Medications[2]  PRN medications:  PRN Medications[3]     Relevant Results:  Lab Results   Component Value Date    WBC 5.9 08/10/2025    HGB 12.1 08/10/2025    HCT 39.3 08/10/2025    MCV 91 08/10/2025     08/10/2025      Lab Results   Component Value Date    GLUCOSE 99 08/10/2025    CALCIUM 8.4 (L) 08/10/2025     08/10/2025    K 3.4 (L) 08/10/2025    CO2 25 08/10/2025     08/10/2025    BUN 38 (H) 08/10/2025    CREATININE 1.58 (H) 08/10/2025                       I personally reviewed all pertinent labwork, imaging, and vital signs, as well as nursing, therapy, discharge planning, and consult notes.  Discussed with cardiology.     Assessment/Plan   Assessment & Plan  Acute systolic heart failure    Susan Singh is a 89 y.o. female with atrial fibrillation on Eliquis, hypertension, hyperlipidemia, " hypothyroidism.  She presented with 2 days of worsening shortness of breath in the setting of 4 to 5 months of fatigue, lightheadedness, and intermittent syncope.  In the ED, she was found to be in atrial fibrillation with RVR to the 130s and hypoxic requiring 4 L O2.  Labs showed PEPE, elevated BNP, elevated troponin, and low TSH with elevated T4.  Chest x-ray showed cardiomegaly with basilar edema and moderate bilateral effusions.  POCUS in the ED suggested low EF. Patient became more hypoxic on 8/10 requiring airvo. VQ scan and DVT US negative. CXR did show worsening edema and bilateral pleural effusions with possible opacities. Pulm consulted and started on heparin drip in case of thoracentesis. CT chest wo con ordered to rule out PNA.      A-fib with RVR:   - Cardiology consulted, S/p amio drip now on amio 200mg daily   - Continue reduced dose of metop succinate 25mg daily   - Per Cardiology may benefit from pacemaker implant, if so would be on Wednesday     Acute decompensated systolic heart failure:   - EF was normal on echo in 2019  - Echo 8/8 showed EF 15%   - IV diuresis w/ lasix drip at 5mg/hr   - strict I/Os, daily weights, telemetry  - Goal K>4, Mg>2  - PT/OT consulted     Acute hypoxic respiratory failure:  Bilateral pleural effusions (R > L):  - D/t above; worsened requiring transfer to SDU and Airvo  - Pt acutely worsened on 8/10, placed on BiPAP  - CXR done 8/10 showed worsening edema w/ bilateral effusions and airspace opacities   - VQ scan ordered 8/10 to r/o PE, negative   - DVT US negative   - CT chest wo con ordered to r/o PNA   - Hold Eliquis, start heparin drip in case of thora   - Pulm consulted, appreciate recs     Elevated troponin: Likely non-MI troponin elevation due to above  - Down trended with better rate control    Lightheadedness with intermittent syncope:  - 2/2 above     PEPE:  - Likely cardiorenal due to above  - Tolerating Lasix so far; suspect actual GFR is much lower given low  muscle mass, requiring higher dose of Lasix    Hypertension:   Hyperlipidemia:   - Hold amlodipine, losartan; reintroduce as tolerated  - Continue pravastatin     Hypothyroidism with thyrotoxicosis:   - Potential trigger for above  -Hold levothyroxine for now; decrease dose on discharge     DVT PPx: Heparin drip      Dispo: Inpatient, likely 3-4 midnights pending clinical improvement.          Toña Vergara DO           [1] amiodarone, 200 mg, oral, Daily  [Held by provider] amLODIPine, 5 mg, oral, Daily  apixaban, 2.5 mg, oral, BID  [Held by provider] levothyroxine, 100 mcg, oral, Daily  [Held by provider] losartan, 100 mg, oral, Daily  metoprolol succinate XL, 25 mg, oral, Daily  polyethylene glycol, 17 g, oral, Daily  pravastatin, 40 mg, oral, Daily  sennosides-docusate sodium, 2 tablet, oral, BID     [2] furosemide, 5 mg/hr, Last Rate: 5 mg/hr (08/10/25 1504)     [3] PRN medications: famotidine, melatonin, ondansetron, oxygen, oxygen

## 2025-08-10 NOTE — PROGRESS NOTES
Physical Therapy                 Therapy Communication Note    Patient Name: Susan Singh  MRN: [id number]  Department: [address]  Room: [address] [place] [address]  Today's Date: 8/10/2025     Discipline: Physical Therapy    Missed Visit: PT Missed Visit: Yes     Missed Visit Reason: Missed Visit Reason: Patient placed on medical hold (Per communication with OTR who spoke with Pt's RN, Pt is having O2 saturation issues, RN asking therapy to hold off and try evaluation at a later date. NO evaluation.)    Missed Time: Attempt    Comment:

## 2025-08-10 NOTE — PROGRESS NOTES
Occupational Therapy                 Therapy Communication Note    Patient Name: Susan Singh  MRN: [id number]  Department: [address]  Room: [address] [place] [address]  Today's Date: 8/10/2025     Discipline: Occupational Therapy    Missed Visit: OT Missed Visit: Yes     Missed Visit Reason: Missed Visit Reason: Patient placed on medical hold (Pt was participating in OT eval with caregiver present and pt's O2 saturation dropped even on O2 and nursing staff enter room and placed on higher O2 mask and requested OT evaluation be attempted later date; OT edu pt and pt's guest on stopping eval)    Missed Time: Attempt    Comment: Pt dropped down to 77% 02 saturation during evaluation while talking and OT evaluation (OT got prior approval for evaluation from nursing staff) was stopped so that pt could become stable with 02 saturation; OT evaluation will be attempted at later date

## 2025-08-10 NOTE — CARE PLAN
The patient's goals for the shift include      The clinical goals for the shift include Pt will remain HDS through end of shift.    Goal achieved

## 2025-08-10 NOTE — PROGRESS NOTES
Subjective Data:  Less dyspnea, no chest pain  NSR sinus bradycardia converted from AF  Overnight Events:    Converted to NSR     Objective Data:  Last Recorded Vitals:  Vitals:    08/10/25 0524 08/10/25 0751 08/10/25 0824 08/10/25 1206   BP:   164/66 163/73   BP Location:   Right arm Right arm   Patient Position:   Lying Lying   Pulse:   73 75   Resp:   18    Temp:   36.1 °C (97 °F) 36.7 °C (98.1 °F)   TempSrc:   Temporal Temporal   SpO2:  91% 91% 91%   Weight: 53.6 kg (118 lb 3.2 oz)      Height:           Last Labs:  CBC - 8/10/2025:  4:40 AM  5.9 12.1 217    39.3      CMP - 8/10/2025:  4:40 AM  8.4 6.7 25 --- 0.9   _ 3.7 24 94      PTT - No results in last year.  _   _ _     TROPHS   Date/Time Value Ref Range Status   08/09/2025 12:50  0 - 13 ng/L Final     Comment:     Previous result verified on 8/9/2025 0845 on specimen/case [id number] called with component TRPHS for procedure Troponin I, High Sensitivity with value 465 ng/L.   08/09/2025 06:22  0 - 13 ng/L Final   08/08/2025 01:43  0 - 13 ng/L Final     Comment:     Previous result verified on 8/8/2025 0755 on specimen/case [id number] called with component TRPHS for procedure Troponin I, High Sensitivity with value 121 ng/L.     BNP   Date/Time Value Ref Range Status   08/08/2025 07:09  0 - 99 pg/mL Final     VLDL   Date/Time Value Ref Range Status   10/15/2019 03:01 PM 32 0 - 40 mg/dL Final   01/23/2019 06:48 AM 22 0 - 40 mg/dL Final   08/30/2018 03:49 PM 46 0 - 40 mg/dL Final      Last I/O:  I/O last 3 completed shifts:  In: 679.7 (12.7 mL/kg) [P.O.:320; I.V.:359.7 (6.7 mL/kg)]  Out: 425 (7.9 mL/kg) [Urine:425 (0.2 mL/kg/hr)]  Weight: 53.6 kg   Transthoracic Echo Complete 08/08/2025    Ejection Fractions:  EF   Date/Time Value Ref Range Status   08/08/2025 02:50 PM 15 %      Physical Exam:  Constitutional: Well developed, awake/alert/oriented x3, no distress, alert and cooperative  Eyes: PERRL, EOMI, clear sclera  ENMT:  mucous membranes moist, no apparent injury, no lesions seen  Head/Neck: Neck supple, no apparent injury, thyroid without mass or tenderness, No JVD, trachea midline, no bruits  Respiratory/Thorax: Patent airways, CTAB, normal breath sounds with good chest expansion, thorax symmetric  Cardiovascular: Regular, rate and rhythm, no murmurs, 2+ equal pulses of the extremities, normal S 1and S 2  Gastrointestinal: Nondistended, soft, non-tender, no rebound tenderness or guarding, no masses palpable, no organomegaly, +BS, no bruits  Musculoskeletal: ROM intact, no joint swelling, normal strength  Extremities: normal extremities, no cyanosis edema, contusions or wounds, no clubbing  Neurological: alert and oriented x3, intact senses, motor, response and reflexes, normal strength  Lymphatic: No significant lymphadenopathy  Psychological: Appropriate mood and behavior  Skin: Warm and dry, no lesions, no rashes       Assessment/Plan   Acute systolic heart failure     Echocardiogram from 2019: LVEF 55-60%        Paroxysmal atrial fibrillation with RVR  -EKG reviewed AF RVR, LBBB  -Given metoprolol 5mg IV x1   -Cont eliquis  -TSH 0.03  -Monitor on tele  - 8/9 started amiodarone gtt for a fib with rvr, reduced metoprolol to succinate 25 mg daily 8/10, continue to monitor on tele     2. Elevated troponins-Non MI elevation  -Denies chest pain  -EKG showed AF RVR, LBBB->new since 2019  -Check echo  -Not a candidate for invasive cardiac procedures due to age  -Cont metoprolol and statin     3. Acute CHF, systolic  -  -CXR showed cardiomegaly with basilar edema and moderate sized bilateral effusions  -I reviewed the Echocardiogram from 2019 as above  -Strict I & Os  -Daily weights  -2gm na diet  -Given Lasix IV x1 in ER  -Oral loop diuretics     4. Acute kidney injury  -Most likely cardiorenal  -Hold losartan Cr 1.58 baseline 0.85  -Monitor     5. Hypertension  -stable  -2gm na diet  -cont meds  -monitor     6.  Hyperlipidemia  -Cont statin     7. Hypothyroidism  -TSH 0.03  -Resume synthroid at 75 mcg daily    Echo reviewed  1. The left ventricular systolic function is severely decreased with a visually estimated ejection fraction of 15%.   2. There is global hypokinesis of the left ventricle with minor regional variations.   3. Spectral Doppler shows a Grade III (restrictive pattern) of left ventricular diastolic filling with an elevated left atrial pressure.   4. Left ventricular cavity size is mildly dilated.   5. There is moderately reduced right ventricular systolic function.   6. Mildly enlarged right ventricle.   7. The left atrium is mildly dilated.   8. Moderate tricuspid regurgitation visualized.   9. The Doppler estimated RVSP is around 45-50 mm Hg.  10. Mild aortic valve regurgitation.  11. Moderately elevated pulmonary artery pressure.     May benefit from pacemaker implant to allow adequate beta blockade and amiodarone therapy  Peripheral IV 08/08/25 20 G Anterior;Right Forearm (Active)   Site Assessment Clean;Dry;Intact 08/09/25 2048   Dressing Status Clean;Dry 08/09/25 2048   Number of days: 2       Peripheral IV 08/09/25 20 G Anterior;Right Forearm (Active)   Site Assessment Clean;Dry;Intact 08/09/25 2048   Dressing Status Clean;Dry 08/09/25 2048   Number of days: 1       Code Status:  DNR and No Intubation    I spent 20 minutes in the professional and overall care of this patient.        Juan Kinney MD

## 2025-08-11 LAB
ALBUMIN SERPL BCP-MCNC: 3.6 G/DL (ref 3.4–5)
ANION GAP SERPL CALC-SCNC: 14 MMOL/L (ref 10–20)
ATRIAL RATE: 147 BPM
BUN SERPL-MCNC: 27 MG/DL (ref 6–23)
CALCIUM SERPL-MCNC: 8.4 MG/DL (ref 8.6–10.3)
CHLORIDE SERPL-SCNC: 99 MMOL/L (ref 98–107)
CO2 SERPL-SCNC: 28 MMOL/L (ref 21–32)
CREAT SERPL-MCNC: 1.17 MG/DL (ref 0.5–1.05)
EGFRCR SERPLBLD CKD-EPI 2021: 43 ML/MIN/1.73M*2
ERYTHROCYTE [DISTWIDTH] IN BLOOD BY AUTOMATED COUNT: 14.7 % (ref 11.5–14.5)
GLUCOSE SERPL-MCNC: 146 MG/DL (ref 74–99)
HCT VFR BLD AUTO: 38.9 % (ref 36–46)
HGB BLD-MCNC: 13.4 G/DL (ref 12–16)
MAGNESIUM SERPL-MCNC: 1.95 MG/DL (ref 1.6–2.4)
MCH RBC QN AUTO: 28.8 PG (ref 26–34)
MCHC RBC AUTO-ENTMCNC: 34.4 G/DL (ref 32–36)
MCV RBC AUTO: 84 FL (ref 80–100)
NRBC BLD-RTO: 0 /100 WBCS (ref 0–0)
PHOSPHATE SERPL-MCNC: 3.8 MG/DL (ref 2.5–4.9)
PLATELET # BLD AUTO: 233 X10*3/UL (ref 150–450)
POTASSIUM SERPL-SCNC: 3.2 MMOL/L (ref 3.5–5.3)
PROCALCITONIN SERPL-MCNC: 0.11 NG/ML
Q ONSET: 213 MS
Q ONSET: 215 MS
Q ONSET: 216 MS
QRS COUNT: 12 BEATS
QRS COUNT: 12 BEATS
QRS COUNT: 23 BEATS
QRS DURATION: 138 MS
QRS DURATION: 146 MS
QRS DURATION: 152 MS
QT INTERVAL: 350 MS
QT INTERVAL: 408 MS
QT INTERVAL: 412 MS
QTC CALCULATION(BAZETT): 438 MS
QTC CALCULATION(BAZETT): 440 MS
QTC CALCULATION(BAZETT): 530 MS
QTC FREDERICIA: 429 MS
QTC FREDERICIA: 429 MS
QTC FREDERICIA: 462 MS
R AXIS: -19 DEGREES
R AXIS: -9 DEGREES
R AXIS: 0 DEGREES
RBC # BLD AUTO: 4.65 X10*6/UL (ref 4–5.2)
SODIUM SERPL-SCNC: 138 MMOL/L (ref 136–145)
T AXIS: 138 DEGREES
T AXIS: 156 DEGREES
T AXIS: 158 DEGREES
T OFFSET: 391 MS
T OFFSET: 417 MS
T OFFSET: 421 MS
UFH PPP CHRO-ACNC: 0.8 IU/ML (ref ?–1.1)
UFH PPP CHRO-ACNC: 1.1 IU/ML (ref ?–1.1)
UFH PPP CHRO-ACNC: 1.4 IU/ML (ref ?–1.1)
UFH PPP CHRO-ACNC: 1.6 IU/ML (ref ?–1.1)
UFH PPP CHRO-ACNC: 1.8 IU/ML (ref ?–1.1)
UFH PPP CHRO-ACNC: 2 IU/ML (ref ?–1.1)
VENTRICULAR RATE: 138 BPM
VENTRICULAR RATE: 68 BPM
VENTRICULAR RATE: 70 BPM
WBC # BLD AUTO: 7.6 X10*3/UL (ref 4.4–11.3)

## 2025-08-11 PROCEDURE — 84145 PROCALCITONIN (PCT): CPT | Mod: GEALAB

## 2025-08-11 PROCEDURE — 99233 SBSQ HOSP IP/OBS HIGH 50: CPT

## 2025-08-11 PROCEDURE — 2500000004 HC RX 250 GENERAL PHARMACY W/ HCPCS (ALT 636 FOR OP/ED)

## 2025-08-11 PROCEDURE — 83735 ASSAY OF MAGNESIUM: CPT | Performed by: STUDENT IN AN ORGANIZED HEALTH CARE EDUCATION/TRAINING PROGRAM

## 2025-08-11 PROCEDURE — 2500000005 HC RX 250 GENERAL PHARMACY W/O HCPCS

## 2025-08-11 PROCEDURE — 2500000001 HC RX 250 WO HCPCS SELF ADMINISTERED DRUGS (ALT 637 FOR MEDICARE OP): Performed by: INTERNAL MEDICINE

## 2025-08-11 PROCEDURE — 2500000002 HC RX 250 W HCPCS SELF ADMINISTERED DRUGS (ALT 637 FOR MEDICARE OP, ALT 636 FOR OP/ED)

## 2025-08-11 PROCEDURE — 85027 COMPLETE CBC AUTOMATED: CPT | Performed by: STUDENT IN AN ORGANIZED HEALTH CARE EDUCATION/TRAINING PROGRAM

## 2025-08-11 PROCEDURE — 99223 1ST HOSP IP/OBS HIGH 75: CPT | Performed by: INTERNAL MEDICINE

## 2025-08-11 PROCEDURE — 85520 HEPARIN ASSAY: CPT

## 2025-08-11 PROCEDURE — 2060000001 HC INTERMEDIATE ICU ROOM DAILY

## 2025-08-11 PROCEDURE — 36415 COLL VENOUS BLD VENIPUNCTURE: CPT

## 2025-08-11 PROCEDURE — 94760 N-INVAS EAR/PLS OXIMETRY 1: CPT

## 2025-08-11 PROCEDURE — 2500000001 HC RX 250 WO HCPCS SELF ADMINISTERED DRUGS (ALT 637 FOR MEDICARE OP): Performed by: STUDENT IN AN ORGANIZED HEALTH CARE EDUCATION/TRAINING PROGRAM

## 2025-08-11 PROCEDURE — 80069 RENAL FUNCTION PANEL: CPT

## 2025-08-11 PROCEDURE — 2500000002 HC RX 250 W HCPCS SELF ADMINISTERED DRUGS (ALT 637 FOR MEDICARE OP, ALT 636 FOR OP/ED): Performed by: INTERNAL MEDICINE

## 2025-08-11 RX ORDER — POTASSIUM CHLORIDE 14.9 MG/ML
20 INJECTION INTRAVENOUS
Status: COMPLETED | OUTPATIENT
Start: 2025-08-11 | End: 2025-08-11

## 2025-08-11 RX ORDER — LANOLIN ALCOHOL/MO/W.PET/CERES
400 CREAM (GRAM) TOPICAL ONCE
Status: COMPLETED | OUTPATIENT
Start: 2025-08-11 | End: 2025-08-11

## 2025-08-11 RX ORDER — POTASSIUM CHLORIDE 20 MEQ/1
40 TABLET, EXTENDED RELEASE ORAL ONCE
Status: COMPLETED | OUTPATIENT
Start: 2025-08-11 | End: 2025-08-11

## 2025-08-11 RX ADMIN — Medication: at 08:42

## 2025-08-11 RX ADMIN — FUROSEMIDE 5 MG/HR: 10 INJECTION, SOLUTION INTRAMUSCULAR; INTRAVENOUS at 14:58

## 2025-08-11 RX ADMIN — POTASSIUM CHLORIDE 20 MEQ: 14.9 INJECTION, SOLUTION INTRAVENOUS at 11:15

## 2025-08-11 RX ADMIN — Medication: at 15:57

## 2025-08-11 RX ADMIN — POTASSIUM CHLORIDE 20 MEQ: 14.9 INJECTION, SOLUTION INTRAVENOUS at 13:32

## 2025-08-11 RX ADMIN — POTASSIUM CHLORIDE EXTENDED-RELEASE 40 MEQ: 1500 TABLET ORAL at 09:27

## 2025-08-11 RX ADMIN — Medication 1 TABLET: at 09:27

## 2025-08-11 RX ADMIN — Medication 1 DOSE: at 13:17

## 2025-08-11 RX ADMIN — Medication: at 23:51

## 2025-08-11 RX ADMIN — Medication: at 21:00

## 2025-08-11 RX ADMIN — PRAVASTATIN SODIUM 40 MG: 40 TABLET ORAL at 09:27

## 2025-08-11 RX ADMIN — AMIODARONE HYDROCHLORIDE 200 MG: 200 TABLET ORAL at 09:25

## 2025-08-11 RX ADMIN — METOPROLOL SUCCINATE 25 MG: 50 TABLET, EXTENDED RELEASE ORAL at 09:24

## 2025-08-11 RX ADMIN — Medication 5 MG: at 20:33

## 2025-08-11 RX ADMIN — HEPARIN SODIUM 1000 UNITS/HR: 10000 INJECTION, SOLUTION INTRAVENOUS at 03:06

## 2025-08-11 ASSESSMENT — COGNITIVE AND FUNCTIONAL STATUS - GENERAL
MOVING FROM LYING ON BACK TO SITTING ON SIDE OF FLAT BED WITH BEDRAILS: A LITTLE
HELP NEEDED FOR BATHING: A LITTLE
DRESSING REGULAR LOWER BODY CLOTHING: A LITTLE
MOBILITY SCORE: 17
TOILETING: A LITTLE
WALKING IN HOSPITAL ROOM: A LITTLE
PERSONAL GROOMING: A LITTLE
CLIMB 3 TO 5 STEPS WITH RAILING: A LOT
DRESSING REGULAR UPPER BODY CLOTHING: A LITTLE
MOVING TO AND FROM BED TO CHAIR: A LITTLE
TURNING FROM BACK TO SIDE WHILE IN FLAT BAD: A LITTLE
DAILY ACTIVITIY SCORE: 19
STANDING UP FROM CHAIR USING ARMS: A LITTLE

## 2025-08-11 ASSESSMENT — ENCOUNTER SYMPTOMS
SHORTNESS OF BREATH: 0
COUGH: 0
WHEEZING: 0

## 2025-08-11 ASSESSMENT — PAIN SCALES - GENERAL
PAINLEVEL_OUTOF10: 0 - NO PAIN
PAINLEVEL_OUTOF10: 0 - NO PAIN

## 2025-08-11 ASSESSMENT — PAIN - FUNCTIONAL ASSESSMENT: PAIN_FUNCTIONAL_ASSESSMENT: 0-10

## 2025-08-11 NOTE — CONSULTS
Consults    PALLIATIVE CARE SOCIAL WORK CONSULT:  Yelena SUAREZ  CURRENT ATTENDING PROVIDER: Toña Vergara DO     Reason for Consult    GOC    Introduction to Palliative Care  Met with pt at bedside  Pt was alert and oriented and able to participate in visit.  Staff present:  Yelena Mccall  Palliative care was introduced as a service for patients with serious illness to help with symptoms, assist with goals of care conversations, navigate complex decision making, improve quality of life for patients, and provide support to patients and families.  Support and empathy was provided throughout the encounter.    History of Present Illness  Susan Singh is a 89 y.o. female with past medical history of afib, HTN, hypothyroidism, hyperlipidemia. Pt is presenting with two days of worsening shortness of breath in the setting of 4 to 5 months of fatigue, lightheadedness, and intermittent syncope.     Caregiving/Caregiver Support  Does the patient require assistance in some or all components of her care, including coordination of medical care?  limited  If Yes, which person serves that role?  brother    Medical History  Per EMR. Pt does not regularly see doctors.      Personal History  Pt is single.  Pt has one son, lives in [place]. Pt lives with her brother Kale, age 87.  Pt worked in [employer].     Tenriism and Importance of Tenriism:  none    Depression   occasional      Anxiety   denies     Advance Directives  Surrogate Health Care Decision Maker:  son Monico  HCPOA  no  Living Will  no    Code Status                    DNR DNI    Serious Illness Conversation        Life Limiting Disease:  cardiac   Disease Specific Information Provided:  concern for current state  What is your understanding now of where you are with your illness:  pt hopeful she will improve  What are your fears, worries, or concerns about the future:   none expressed  What are you hoping for:  dc home  What brings you aydee:  used to enjoy  bowling   How much does your family know about your priorities and wishes:  needs further discussion     Other distressing Symptoms        Pt currently on airvo, pending thoracentesis.      Plan and Social Considerations  Pt reports her son is scheduled to be here this week.  Pt does not see him often.  Pt reports brother drives and they manage at home.     Plan of Care discussed with: team    Thank you for asking Palliative Care to assist with care of this patient.  We will continue to follow  Please contact us for additional questions or concerns.    DANIELA Boyd  Palliative Care   Contact Via Epic Secure chat

## 2025-08-11 NOTE — CARE PLAN
The patient's goals for the shift include      The clinical goals for the shift include Patient will wean off of Airvo throughout shift.    Over the shift, the patient did not make progress toward the following goals. Barriers to progression include Patient oxygen demand has decreased, Did not wean off of airvo today. Recommendations to address these barriers include continue to monitor.

## 2025-08-11 NOTE — CONSULTS
"Nutrition Assessment    Reason for Assessment: Admission nursing screening (MST=2, weight loss; consult needed: no)    Patient is a 89 y.o. female presenting with: Acute systolic heart failure,   Pt on IV diuresis, may require PPM    Medical History[1]   Surgical History[2]       Nutrition History:  Energy Intake:  (varied per nursing documentation)  Pain affecting nutrition status: N/A  Food and Nutrient History: Pt reports her appetite is wnl, no issues. Pt reports she dislikes Ensure ONS, does  not want them at this time. Pt reports her weight is stable, no food allergies and no special diet restrictions followed at home.  Food Allergy:  (none)     RX Allergies[3]     Anthropometrics:  Height: 162.6 cm (5' 4\")   Weight: 52.7 kg (116 lb 3.2 oz)   BMI (Calculated): 19.94  IBW/kg (Dietitian Calculated): 54.5 kg  Percent of IBW: 97 %        Weight History:     Daily Weight  08/11/25 : 52.7 kg (116 lb 3.2 oz)  09/26/24 : 52.6 kg (116 lb)  07/14/23 : 54.9 kg (121 lb)    Weight         8/8/2025  0700 8/10/2025  0524 8/11/2025  0533       Weight: 59 kg (130 lb) 53.6 kg (118 lb 3.2 oz) 52.7 kg (116 lb 3.2 oz)             Weight Change %:  Significant Weight Loss: No    Nutrition Focused Physical Exam Findings:    Subcutaneous Fat Loss  Defer Subcutaneous Fat Loss Assessment:  (? if losses d/t age)  Orbital Fat Pads: Mild-Moderate (slight dark circles and slight hollowing)  Buccal Fat Pads: Mild-Moderate (flat cheeks, minimal bounce)  Muscle Wasting  Temporalis: Mild-Moderate (slight depression)  Edema  Edema: none  Physical Findings  Hair: Negative  Eyes: Negative  Nails: Negative  Skin: Negative (skin intact)  Respiratory : Positive (on airvo)  Digestive System Findings: Other (Comment) (none)  Mouth Findings: Other (Comment) (none)  Teeth Findings: Other (comment) (no issues per pt)    Nutrition Significant Labs:    Results from last 7 days   Lab Units 08/11/25  0713 08/10/25  0440 08/09/25  0622   GLUCOSE mg/dL 146* 99 " "111*   SODIUM mmol/L 138 138 140   POTASSIUM mmol/L 3.2* 3.4* 3.9   CHLORIDE mmol/L 99 101 104   CO2 mmol/L 28 25 24   BUN mg/dL 27* 38* 35*   CREATININE mg/dL 1.17* 1.58* 1.35*   EGFR mL/min/1.73m*2 43* 30* 36*   CALCIUM mg/dL 8.4* 8.4* 8.7   PHOSPHORUS mg/dL 3.8  --   --    MAGNESIUM mg/dL 1.95 2.16 2.30     No results found for: \"HGBA1C\"  Results from last 7 days   Lab Units 08/08/25  1135   POCT GLUCOSE mg/dL 128*     Lab Results   Component Value Date    ALBUMIN 3.6 08/11/2025        Nutrition Specific Medications:   Scheduled medications:  Scheduled Medications[4]  Continuous medications:  Continuous Medications[5]  PRN medications:  PRN Medications[6]     Nursing Data Per flowsheet:   Stool Appearance: Loose, Watery (08/10/25 2100)  Gastrointestinal  Gastrointestinal (WDL): Exceptions to WDL  Abdomen Inspection: Soft, Distended  Abdominal Tenderness: Nontender, No guarding  Bowel Sounds: All quadrants  Bowel Sounds (All Quadrants): Active, Present  Last BM Date: 08/10/25  Bowel Incontinence: No  Stool Appearance: Loose, Watery  Stool Color: Brown  Feeding assistance level: Independent    Intake/Output Summary (Last 24 hours) at 8/11/2025 0919  Last data filed at 8/11/2025 0817  Gross per 24 hour   Intake 517.62 ml   Output 900 ml   Net -382.38 ml      0-10 (Numeric) Pain Score: 0 - No pain   Dietary Orders (From admission, onward)       Start     Ordered    08/08/25 1313  Adult diet Cardiac; 70 gm fat; 2 - 3 grams Sodium  (Order Panel)  Diet effective now        Question Answer Comment   Diet type Cardiac    Fat restriction: 70 gm fat    Sodium restriction: 2 - 3 grams Sodium        08/08/25 1313    08/08/25 1017  May Participate in Room Service With Assistance  ( ROOM SERVICE MAY PARTICIPATE WITH ASSISTANCE)  Once        Question:  .  Answer:  Yes    08/08/25 1016                     Estimated Needs:   Total Energy Estimated Needs in 24 hours (kCal): 1581 kCal  Method for Estimating Needs: 30 " kcal/kg  Total Protein Estimated Needs in 24 Hours (g):  (58-69)  Method for Estimating 24 Hour Protein Needs: 1.1-1.3 g/kg     Method for Estimating 24 Hour Fluid Needs: 1 ml/kcal or per MD       Nutrition Diagnosis   Malnutrition Diagnosis  Patient has Malnutrition Diagnosis: No    Nutrition Diagnosis  Patient has Nutrition Diagnosis: No       Nutrition Interventions/Recommendations        Nutrition Recommendations:       Nutrition Interventions/Goals:   No Intervention indicated at this time       Coordination of Care: n/a  Recommendations:  Re-consult dietitian as needed; intervention not currently indicated         Time Spent (min): 45 minutes         [1]   Past Medical History:  Diagnosis Date    Atrial fibrillation (Multi)     Dysuria 07/24/2014    Burning with urination    Encounter for immunization 11/16/2015    Immunization due    Hyperlipidemia     Hypertension     Hypo-osmolality and hyponatremia     Hyponatremia    Other conditions influencing health status     History of vaginal delivery   [2]   Past Surgical History:  Procedure Laterality Date    CATARACT EXTRACTION  05/21/2013    Cataract Surgery    COLONOSCOPY  02/18/2015    Complete Colonoscopy    DILATION AND CURETTAGE OF UTERUS  05/21/2013    Dilation And Curettage    TOTAL HIP ARTHROPLASTY  11/20/2013    Total Hip Replacement   [3]   Allergies  Allergen Reactions    Chlorthalidone Other    Ciprofloxacin Other and Nausea Only    Hydrochlorothiazide Other   [4] amiodarone, 200 mg, oral, Daily  [Held by provider] amLODIPine, 5 mg, oral, Daily  [Held by provider] apixaban, 2.5 mg, oral, BID  [Held by provider] levothyroxine, 100 mcg, oral, Daily  [Held by provider] losartan, 100 mg, oral, Daily  magnesium oxide, 400 mg of magnesium oxide, oral, Once  metoprolol succinate XL, 25 mg, oral, Daily  polyethylene glycol, 17 g, oral, Daily  potassium chloride, 20 mEq, intravenous, q2h  potassium chloride CR, 40 mEq, oral, Once  pravastatin, 40 mg, oral,  Daily  sennosides-docusate sodium, 2 tablet, oral, BID  [5] furosemide, 5 mg/hr, Last Rate: 5 mg/hr (08/10/25 6404)  heparin, 0-4,500 Units/hr, Last Rate: Stopped (08/11/25 4741)  [6] PRN medications: famotidine, heparin, melatonin, ondansetron, oxygen, oxygen

## 2025-08-11 NOTE — PROGRESS NOTES
Occupational Therapy                 Therapy Communication Note    Patient Name: Susan Singh  MRN: [id number]  Department: [facility]  [facility]  Today's Date: 8/11/2025     Discipline: Occupational Therapy    Missed Visit:   yes at 1000    Missed Visit Reason:  Discussed with PT, Patient transitioned to bipap from airvo, heparin started and not therapeutic at this time. Discussed with RN and attending, hold therapy evals at this time.     Missed Time: Attempt

## 2025-08-11 NOTE — CONSULTS
"    Department of Medicine  Division of Pulmonary, Critical Care, and Sleep Medicine  Location  [facility]    Inpatient consult to Pulmonology  Consult performed by: Clayton Paredes DO  Consult ordered by: Toña Vergara DO        Reason for consult: \"Acute hypoxic respiratory failure worsening now on BiPAP, decompensated CHF, moderate bilateral pleural effusions \"    Physician HPI (8/11/2025):  89 y.o. female admitted on 8/8/2025  6:51 AM for shortness of breath. She has a history of Afib, HTN, HLD, hypothyroidism. Has been fatigued, light-headed, and passing out for the past 5 months. 2 days before admission, she developed worsening dyspnea. On initial triage found to be in Afib with RVR and required 4L/min supplemental O2. Initial CXR with pulmonary edema and bilateral pleural effusions. ED POCUS echo notable for depressed EF. She has been treated for decompensated heart failure, and is currently on a lasix infusion. Echo with grade III diastolic dysfunction and EF 15%. Due to persistent hypoxia, she had negative lower extremity duplex studies done on 8/10/2025, negative V/Q scan for P.E. 8/10/2025. CT chest notable for bilateral moderate pleural effusions.    Patient denies feeling any shortness of breath despite being on Airvo. She has an occasional non-productive cough. States she smoke ages ago.     PMH:  Medical History[1]    PSH:  Surgical History[2]    FHx:  Family History[3]    Social Hx:  Social History[4]    Immunization History:  Immunization History   Administered Date(s) Administered    Moderna SARS-CoV-2 Vaccination 06/28/2021, 07/26/2021, 02/28/2022       Current Medications:  Scheduled medications  amiodarone, 200 mg, oral, Daily  [Held by provider] amLODIPine, 5 mg, oral, Daily  [Held by provider] apixaban, 2.5 mg, oral, BID  [Held by provider] levothyroxine, 100 mcg, oral, Daily  [Held by provider] losartan, 100 mg, oral, Daily  metoprolol succinate XL, 25 mg, oral, Daily  polyethylene " glycol, 17 g, oral, Daily  pravastatin, 40 mg, oral, Daily  sennosides-docusate sodium, 2 tablet, oral, BID      Continuous medications  furosemide, 5 mg/hr, Last Rate: 5 mg/hr (08/10/25 1839)  heparin, 0-4,500 Units/hr, Last Rate: Stopped (08/11/25 0741)      PRN medications  PRN Medications[5]     Drug Allergies/Intolerances:  RX Allergies[6]     Review of Systems:  Review of Systems   Respiratory:  Negative for cough, shortness of breath and wheezing.    Cardiovascular:  Negative for chest pain and leg swelling.        All other review of systems are negative and/or non-contributory.    Physical Examination:      8/10/2025     8:24 AM 8/10/2025    12:06 PM 8/10/2025     3:20 PM 8/10/2025     4:57 PM 8/10/2025     7:24 PM 8/11/2025    12:15 AM 8/11/2025     5:33 AM   Vitals   Systolic 164 163  163 172 145 141   Diastolic 66 73  75 75 75 72   BP Location Right arm Right arm  Right arm Right arm Right arm Right arm   Heart Rate 73 75  66 72 67 76   Temp 36.1 °C (97 °F) 36.7 °C (98.1 °F)  36 °C (96.8 °F) 36.2 °C (97.2 °F) 36.4 °C (97.5 °F) 36.1 °C (97 °F)   Resp 18  23  20 20 18   Weight (lb)       116.2   BMI       19.95 kg/m2   BSA (m2)       1.54 m2         GEN: elderly woman who is breathing comfortably  ENT: wearing Airvo nasal cannula  CV: RRR, no m/g/r  LUNGS: poor effort, diminished at bases bilaterally  EXT: no edema, cyanosis, clubbing      Pulmonary Function Test Results     None    Exacerbation History     N/A    Imaging     CT chest 8/10/2025:    V/Q scan 8/10/2025:  The perfusion of both lungs is within normal limits with no evidence  for acute pulmonary embolism.      Moderate bilateral pleural effuion, more severe on right than left    CXR 8/10/2025:  Worsening basilar edema with bilateral pleural effusions and airspace  opacities increasing in size.    CXR 8/8/2025:  Cardiomegaly with basilar edema and moderate-sized bilateral  effusions.    Bronchoscopy     None    Labs     Results for orders placed  or performed during the hospital encounter of 08/08/25 (from the past 24 hours)   Blood Gas Arterial   Result Value Ref Range    POCT pH, Arterial 7.50 (H) 7.38 - 7.42 pH    POCT pCO2, Arterial 35 (L) 38 - 42 mm Hg    POCT pO2, Arterial 53 (L) 85 - 95 mm Hg    POCT SO2, Arterial 87 (L) 94 - 100 %    POCT Oxy Hemoglobin, Arterial 85.6 (L) 94.0 - 98.0 %    POCT Base Excess, Arterial 4.2 (H) -2.0 - 3.0 mmol/L    POCT HCO3 Calculated, Arterial 27.3 (H) 22.0 - 26.0 mmol/L    Patient Temperature      FiO2 100 %   Heparin Assay   Result Value Ref Range    Heparin Unfractionated >2.0 (HH) See Comment Below for Therapeutic Ranges IU/mL   Heparin Assay, UFH   Result Value Ref Range    Heparin Unfractionated 2.0 (HH) See Comment Below for Therapeutic Ranges IU/mL   Heparin Assay, UFH   Result Value Ref Range    Heparin Unfractionated 1.8 (HH) See Comment Below for Therapeutic Ranges IU/mL   CBC   Result Value Ref Range    WBC 7.6 4.4 - 11.3 x10*3/uL    nRBC 0.0 0.0 - 0.0 /100 WBCs    RBC 4.65 4.00 - 5.20 x10*6/uL    Hemoglobin 13.4 12.0 - 16.0 g/dL    Hematocrit 38.9 36.0 - 46.0 %    MCV 84 80 - 100 fL    MCH 28.8 26.0 - 34.0 pg    MCHC 34.4 32.0 - 36.0 g/dL    RDW 14.7 (H) 11.5 - 14.5 %    Platelets 233 150 - 450 x10*3/uL   Magnesium   Result Value Ref Range    Magnesium 1.95 1.60 - 2.40 mg/dL   Renal function panel   Result Value Ref Range    Glucose 146 (H) 74 - 99 mg/dL    Sodium 138 136 - 145 mmol/L    Potassium 3.2 (L) 3.5 - 5.3 mmol/L    Chloride 99 98 - 107 mmol/L    Bicarbonate 28 21 - 32 mmol/L    Anion Gap 14 10 - 20 mmol/L    Urea Nitrogen 27 (H) 6 - 23 mg/dL    Creatinine 1.17 (H) 0.50 - 1.05 mg/dL    eGFR 43 (L) >60 mL/min/1.73m*2    Calcium 8.4 (L) 8.6 - 10.3 mg/dL    Phosphorus 3.8 2.5 - 4.9 mg/dL    Albumin 3.6 3.4 - 5.0 g/dL   Heparin Assay   Result Value Ref Range    Heparin Unfractionated 2.0 (HH) See Comment Below for Therapeutic Ranges IU/mL         Echocardiogram     8/8/2025:   1. The left ventricular  systolic function is severely decreased with a visually estimated ejection fraction of 15%.   2. There is global hypokinesis of the left ventricle with minor regional variations.   3. Spectral Doppler shows a Grade III (restrictive pattern) of left ventricular diastolic filling with an elevated left atrial pressure.   4. Left ventricular cavity size is mildly dilated.   5. There is moderately reduced right ventricular systolic function.   6. Mildly enlarged right ventricle.   7. The left atrium is mildly dilated.   8. Moderate tricuspid regurgitation visualized.   9. The Doppler estimated RVSP is around 45-50 mm Hg.  10. Mild aortic valve regurgitation.  11. Moderately elevated pulmonary artery pressure.          CAT and mMRC     N/A    ASSESSMENT & PLAN     Summary:  89 y.o. female with severe HFrEF admitted on 8/8/2025  6:51 AM for dyspnea, Afib with RVR. She has had increasing O2 demands and is now on Airvo. Performed bedside ultrasound today which demonstrated bilateral moderate pleural effusions. After discussing risks/benefits of thoracentesis, patient declined stating she would like to continue on medical therapy.    Problem list:  -acute hypoxic respiratory failure  -bilateral pleural effusions  -decompensated heart failure    Recommendations:  -continue diuresis, and medical management  -patient declined thoracentesis. Voiced understanding of risks, benefits, alternatives  -Continue supplemental oxygen to goal SpO2 of 90-92%. Currently on 60L/min and 80% FiO2. SpO2 is 95%      Clayton Paredes DO  Staff Physician - Pulmonary & Critical Care  08/11/25 8:00 AM               [1]   Past Medical History:  Diagnosis Date    Atrial fibrillation (Multi)     Dysuria 07/24/2014    Burning with urination    Encounter for immunization 11/16/2015    Immunization due    Hyperlipidemia     Hypertension     Hypo-osmolality and hyponatremia     Hyponatremia    Other conditions influencing health status     History of vaginal  delivery   [2]   Past Surgical History:  Procedure Laterality Date    CATARACT EXTRACTION  05/21/2013    Cataract Surgery    COLONOSCOPY  02/18/2015    Complete Colonoscopy    DILATION AND CURETTAGE OF UTERUS  05/21/2013    Dilation And Curettage    TOTAL HIP ARTHROPLASTY  11/20/2013    Total Hip Replacement   [3] No family history on file.  [4]   Social History  Socioeconomic History    Marital status:    Tobacco Use    Smoking status: Never    Smokeless tobacco: Never   Vaping Use    Vaping status: Never Used   Substance and Sexual Activity    Alcohol use: Never    Drug use: Never     Social Drivers of Health     Financial Resource Strain: Low Risk  (8/9/2025)    Overall Financial Resource Strain (CARDIA)     Difficulty of Paying Living Expenses: Not hard at all   Food Insecurity: No Food Insecurity (8/8/2025)    Hunger Vital Sign     Worried About Running Out of Food in the Last Year: Never true     Ran Out of Food in the Last Year: Never true   Transportation Needs: No Transportation Needs (8/9/2025)    PRAPARE - Transportation     Lack of Transportation (Medical): No     Lack of Transportation (Non-Medical): No   Intimate Partner Violence: Not At Risk (8/8/2025)    Humiliation, Afraid, Rape, and Kick questionnaire     Fear of Current or Ex-Partner: No     Emotionally Abused: No     Physically Abused: No     Sexually Abused: No   Housing Stability: Low Risk  (8/9/2025)    Housing Stability Vital Sign     Unable to Pay for Housing in the Last Year: No     Number of Times Moved in the Last Year: 0     Homeless in the Last Year: No   [5] PRN medications: famotidine, heparin, melatonin, ondansetron, oxygen, oxygen  [6]   Allergies  Allergen Reactions    Chlorthalidone Other    Ciprofloxacin Other and Nausea Only    Hydrochlorothiazide Other

## 2025-08-11 NOTE — PROGRESS NOTES
08/11/25 1005   Discharge Planning   Living Arrangements Family members  (Home with brother Kale)   Support Systems Family members   Assistance Needed A&OX4; dependent on assist as of recent for ADLs with walker; doesn't drive; room air baseline-currently requiring Airvo/denies CPAP; PCP Dr ABDELRAHMAN Santacruz/denies current home care.   Type of Residence Private residence   Number of Stairs to Enter Residence 5   Number of Stairs Within Residence 12  (to basement)   Do you have animals or pets at home? No   Who is requesting discharge planning? Provider   Home or Post Acute Services Post acute facilities (Rehab/SNF/etc)   Type of Post Acute Facility Services Skilled nursing   Expected Discharge Disposition SNF  (DC dispo is pending workup and PT/OT evals (currently on medicaly hold and not able to participate). If SNF recommended, no precert but 3 inpatient midnights would be needed.)   Does the patient need discharge transport arranged? Yes   [facility] coordination needed? Yes   Has discharge transport been arranged? No   Stroke Family Assessment   Stroke Family Assessment Needed No   Intensity of Service   Intensity of Service 0-30 min

## 2025-08-11 NOTE — PROGRESS NOTES
Physical Therapy                 Therapy Communication Note    Patient Name: Susan Singh  MRN: [id number]  Department: [facility]  [facility]  Today's Date: 8/11/2025     Discipline: Physical Therapy    Missed Visit:   Yes @ 0949    Missed Visit Reason:  Patient transitioned to bipap from airvo, heparin started and not therapeutic at this time. Discussed with RN and attending, hold therapy evals at this time. Communicated with OT.     Missed Time: Attempt

## 2025-08-11 NOTE — PROGRESS NOTES
Susan Singh is a 89 y.o. female on day 3 of admission presenting with Acute systolic heart failure.      Subjective     Susan was laying in bed, continues to have shortness of breath. Currently on Airvo and lasix gtt. Tele reviewed, she is in SR.     Review of systems:  Constitutional: negative for fever, chills, or malaise  Neuro: negative for dizziness, headache, numbness, tingling  ENT: Negative for nasal congestion or sore throat  Resp: positive for shortness of breath, cough, or wheezing  CV: negative for chest pain, palpitations  GI: negative for abd pain, nausea, vomiting or diarrhea  : negative for dysuria, frequency, or urgency  Skin: negative for lesions, wounds, or rash  Musculoskeletal: Negative for weakness, myalgia, or arthralgia  Endocrine: Negative for polyuria or polydipsia       Objective   Constitutional: Well developed, awake/alert/oriented x3, no distress, alert and cooperative  Eyes: PERRL, EOMI, clear sclera  ENMT: mucous membranes moist, no apparent injury, no lesions seen  Head/Neck: Neck supple, no apparent injury, thyroid without mass or tenderness, No JVD, trachea midline, no bruits  Respiratory/Thorax: Patent airways, diminished throughout  Cardiovascular: Regular, rate and rhythm, no murmurs, 2+ equal pulses of the extremities, normal S 1and S 2  Gastrointestinal: Nondistended, soft, non-tender, no rebound tenderness or guarding, no masses palpable, no organomegaly, +BS, no bruits  Musculoskeletal: ROM intact, no joint swelling, normal strength  Extremities: normal extremities, no cyanosis edema, contusions or wounds, no clubbing  Neurological: alert and oriented x3, intact senses, motor, response and reflexes, normal strength  Lymphatic: No significant lymphadenopathy  Psychological: Appropriate mood and behavior  Skin: Warm and dry, no lesions, no rashes      Last Recorded Vitals  /53 (BP Location: Right arm, Patient Position: Lying)   Pulse 70   Temp 36 °C (96.8 °F)  "(Temporal)   Resp 16   Ht 1.626 m (5' 4\")   Wt 52.7 kg (116 lb 3.2 oz)   SpO2 98%   BMI 19.95 kg/m²     Intake/Output last 3 Shifts:  I/O last 3 completed shifts:  In: 491.5 (9.3 mL/kg) [P.O.:430; I.V.:61.5 (1.2 mL/kg)]  Out: 1025 (19.4 mL/kg) [Urine:1025 (0.5 mL/kg/hr)]  Weight: 52.7 kg   I/O this shift:  In: 625.8 [P.O.:480; I.V.:45.8; IV Piggyback:100]  Out: -     Relevant Results  Scheduled medications  Scheduled Medications[1]  Continuous medications  Continuous Medications[2]  PRN medications  PRN Medications[3]    Results for orders placed or performed during the hospital encounter of 08/08/25 (from the past 24 hours)   Blood Gas Arterial   Result Value Ref Range    POCT pH, Arterial 7.50 (H) 7.38 - 7.42 pH    POCT pCO2, Arterial 35 (L) 38 - 42 mm Hg    POCT pO2, Arterial 53 (L) 85 - 95 mm Hg    POCT SO2, Arterial 87 (L) 94 - 100 %    POCT Oxy Hemoglobin, Arterial 85.6 (L) 94.0 - 98.0 %    POCT Base Excess, Arterial 4.2 (H) -2.0 - 3.0 mmol/L    POCT HCO3 Calculated, Arterial 27.3 (H) 22.0 - 26.0 mmol/L    Patient Temperature      FiO2 100 %   ECG 12 lead   Result Value Ref Range    Ventricular Rate 68 BPM    QRS Duration 146 ms    QT Interval 412 ms    QTC Calculation(Bazett) 438 ms    R Axis -9 degrees    T Axis 156 degrees    QRS Count 12 beats    Q Onset 215 ms    T Offset 421 ms    QTC Fredericia 429 ms   Heparin Assay   Result Value Ref Range    Heparin Unfractionated >2.0 (HH) See Comment Below for Therapeutic Ranges IU/mL   ECG 12 Lead   Result Value Ref Range    Ventricular Rate 70 BPM    QRS Duration 152 ms    QT Interval 408 ms    QTC Calculation(Bazett) 440 ms    R Axis 0 degrees    T Axis 138 degrees    QRS Count 12 beats    Q Onset 213 ms    T Offset 417 ms    QTC Fredericia 429 ms   Heparin Assay, UFH   Result Value Ref Range    Heparin Unfractionated 2.0 (HH) See Comment Below for Therapeutic Ranges IU/mL   Heparin Assay, UFH   Result Value Ref Range    Heparin Unfractionated 1.8 () See " Comment Below for Therapeutic Ranges IU/mL   CBC   Result Value Ref Range    WBC 7.6 4.4 - 11.3 x10*3/uL    nRBC 0.0 0.0 - 0.0 /100 WBCs    RBC 4.65 4.00 - 5.20 x10*6/uL    Hemoglobin 13.4 12.0 - 16.0 g/dL    Hematocrit 38.9 36.0 - 46.0 %    MCV 84 80 - 100 fL    MCH 28.8 26.0 - 34.0 pg    MCHC 34.4 32.0 - 36.0 g/dL    RDW 14.7 (H) 11.5 - 14.5 %    Platelets 233 150 - 450 x10*3/uL   Magnesium   Result Value Ref Range    Magnesium 1.95 1.60 - 2.40 mg/dL   Renal function panel   Result Value Ref Range    Glucose 146 (H) 74 - 99 mg/dL    Sodium 138 136 - 145 mmol/L    Potassium 3.2 (L) 3.5 - 5.3 mmol/L    Chloride 99 98 - 107 mmol/L    Bicarbonate 28 21 - 32 mmol/L    Anion Gap 14 10 - 20 mmol/L    Urea Nitrogen 27 (H) 6 - 23 mg/dL    Creatinine 1.17 (H) 0.50 - 1.05 mg/dL    eGFR 43 (L) >60 mL/min/1.73m*2    Calcium 8.4 (L) 8.6 - 10.3 mg/dL    Phosphorus 3.8 2.5 - 4.9 mg/dL    Albumin 3.6 3.4 - 5.0 g/dL   Heparin Assay   Result Value Ref Range    Heparin Unfractionated 2.0 (HH) See Comment Below for Therapeutic Ranges IU/mL   Heparin Assay   Result Value Ref Range    Heparin Unfractionated 1.6 (HH) See Comment Below for Therapeutic Ranges IU/mL   Heparin Assay   Result Value Ref Range    Heparin Unfractionated 1.4 (HH) See Comment Below for Therapeutic Ranges IU/mL       CT chest wo IV contrast   Final Result   1. Large bilateral pleural effusions are present along with near   total collapse of both lower lobes.   2. Remote granulomatous disease of the chest and abdomen.   3. Findings compatible with multiple left renal cysts.   4. Patchy ground-glass infiltrate left apex        MACRO:   Incidental Finding:   A ground glass pulmonary nodule measuring 6 mm   or larger.  (**-YCF-**)        Instructions:  Consider follow up non contrast chest CT at 6-12   months to confirm persistence, then consider CT chest every 2 years   until 5 years. (Sarkis Batres et al., Guidelines for management of   incidental pulmonary nodules  detected on CT images: From the   Fleischner Society 2017, Radiology. 2017 Jul;284 (1):228-243.)   [email or web address]        Signed by: Kale Thorpe 8/11/2025 8:37 AM   Dictation workstation:   [id number]      Vascular US lower extremity venous duplex bilateral   Final Result   Suboptimal visualization of the calf veins. No sonographic evidence   DVT in the visualized vessels of bilateral lower extremities.        MACRO:   None        Signed by: Jose Jimenez 8/10/2025 7:06 PM   Dictation workstation:   [id number]      NM Lung perfusion with spect/ct   Final Result   The perfusion of both lungs is within normal limits with no evidence   for acute pulmonary embolism.        Moderate bilateral pleural effuion, more severe on right than left             The interpretation above is based on modified PIOPED II and PISAPED   criteria.        This study was analyzed and interpreted at [place], [place].             Signed by: Saul Kingston 8/10/2025 4:35 PM   Dictation workstation:   [id number]      XR chest 1 view   Final Result        Worsening basilar edema with bilateral pleural effusions and airspace   opacities increasing in size.        No evidence of pneumothorax.        Signed by: Leobardo Leal 8/10/2025 6:19 PM   Dictation workstation:   [id number]      Transthoracic Echo Complete   Final Result      XR chest 1 view   Final Result   Cardiomegaly with basilar edema and moderate-sized bilateral   effusions.        Signed by: Leobardo Leal 8/8/2025 7:35 AM   Dictation workstation:   [id number]      CT head wo IV contrast   Final Result   NO ACUTE INTRACRANIAL PROCESS. SKULL INTACT        MACRO:   None        Signed by: Ranjith Prado 8/8/2025 7:36 AM   Dictation workstation:   [id number]          Transthoracic Echo Complete  Result Date: 8/9/2025   [facility], [address] [address]               Tel [phone] and Fax [phone] TRANSTHORACIC  ECHOCARDIOGRAM REPORT  Patient Name:       CHICHI BOYCE        Reading Physician:    [id number] Juan Kinney MD Study Date:         8/8/2025            Ordering Provider:    [id number] HEIDY LUU MRN/PID:            [id number]            Fellow: Accession#:         [id number]        Nurse:                Jackie Alexander RN Date of Birth/Age:  1/28/1928 / 89      Sonographer:          Tomasa luu RDCS Gender assigned at  F                   Additional Staff: Birth: Height:             162.56 cm           Admit Date:           8/8/2025 Weight:             104.33 kg           Admission Status:     Inpatient -                                                               Routine BSA / BMI:          2.08 m2 / 39.48     Encounter#:           [id number]                     kg/m2 Blood Pressure:     139/83 mmHg         Department Location:  [facility] Non                                                               Invasive Study Type:    TRANSTHORACIC ECHO (TTE) COMPLETE Diagnosis/ICD: Acute systolic (congestive) heart failure (CHF)-I50.21 Indication:    Congestive Heart Failure CPT Code:      Echo Complete w Full Doppler-[id number] Patient History: Pertinent History: HTN, A-Fib and Dyspnea. Study Detail: The following Echo studies were performed: 2D, M-Mode, Doppler and               color flow. Definity used as a contrast agent for endocardial               border definition. Total contrast used for this procedure was 2.0               mL via IV push. The patient was awake.  PHYSICIAN INTERPRETATION: Left Ventricle: The left ventricular systolic function is severely decreased with a visually estimated ejection fraction of 15%. There is global hypokinesis of the left ventricle with minor regional variations. The left ventricular cavity  size is mildly dilated. There is normal septal and normal posterior left ventricular wall thickness. There is left ventricular concentric remodeling. Spectral Doppler shows a Grade III (restrictive pattern) of left ventricular diastolic filling with an elevated left atrial pressure. Left Atrium: The left atrium is mildly dilated. Right Ventricle: The right ventricle is mildly enlarged. There is moderately reduced right ventricular systolic function. Right Atrium: The right atrium is mildly dilated. Aortic Valve: The aortic valve is trileaflet. The aortic valve area by VTI is 1.89 cmÂ² with a peak velocity of 0.88 m/s. The peak and mean gradients are 3 mmHg and 2 mmHg, respectively with a dimensionless index of 0.70. There is moderate aortic valve thickening. There is mild aortic valve regurgitation. Mitral Valve: The mitral valve is normal in structure. The doppler estimated peak and mean diastolic pressure gradients are 1.8 mmHg and 1 mmHg, respectively. The mean gradient of the mitral valve is 1 mmHg. There is mild mitral valve regurgitation. The E Vmax is 0.65 m/s. Tricuspid Valve: The tricuspid valve is structurally normal. There is moderate tricuspid regurgitation. The Doppler estimated right ventricular systolic pressure (RVSP) is mildly elevated at 38 mmHg. Pulmonic Valve: The pulmonic valve is structurally normal. There is mild pulmonic valve regurgitation. Pericardium: Trivial to small pericardial effusion. Aorta: The aortic root is normal. Pulmonary Artery: The tricuspid regurgitant velocity is 2.94 m/s, and with an estimated right atrial pressure of 3, the estimated pulmonary artery pressure is moderately elevated with the RVSP at 38 mmHg. Systemic Veins: The inferior vena cava appears normal in size, with IVC inspiratory collapse greater than 50%.  CONCLUSIONS:  1. The left ventricular systolic function is severely decreased with a visually estimated ejection fraction of 15%.  2. There is global  hypokinesis of the left ventricle with minor regional variations.  3. Spectral Doppler shows a Grade III (restrictive pattern) of left ventricular diastolic filling with an elevated left atrial pressure.  4. Left ventricular cavity size is mildly dilated.  5. There is moderately reduced right ventricular systolic function.  6. Mildly enlarged right ventricle.  7. The left atrium is mildly dilated.  8. Moderate tricuspid regurgitation visualized.  9. The Doppler estimated RVSP is around 45-50 mm Hg. 10. Mild aortic valve regurgitation. 11. Moderately elevated pulmonary artery pressure. QUANTITATIVE DATA SUMMARY:  2D MEASUREMENTS:          Normal Ranges: IVSd:            0.87 cm  (0.6-1.1cm) LVPWd:           0.87 cm  (0.6-1.1cm) LVIDd:           3.35 cm  (3.9-5.9cm) LVIDs:           2.77 cm LV Mass Index:   38 g/m2 LVEDV Index:     27 ml/m2 LV % FS          17.3 %  LEFT ATRIUM:                  Normal Ranges: LA Vol A4C:        52.2 ml    (22+/-6mL/m2) LA Vol A2C:        39.6 ml LA Vol BP:         47.4 ml LA Vol Index A4C:  25.2 ml/m2 LA Vol Index A2C:  19.1 ml/m2 LA Vol Index BP:   22.8 ml/m2 LA Area A4C:       17.7 cm2 LA Area A2C:       14.8 cm2 LA Major Axis A4C: 5.1 cm LA Major Axis A2C: 4.7 cm LA Volume Index:   22.6 ml/m2 LA Vol A4C:        49.0 ml LA Vol A2C:        36.6 ml LA Vol Index BSA:  20.6 ml/m2  RIGHT ATRIUM:          Normal Ranges: RA Area A4C:  17.6 cm2  AORTA MEASUREMENTS:         Normal Ranges: Ao Sinus, d:        2.90 cm (2.1-3.5cm) Asc Ao, d:          3.20 cm (2.1-3.4cm)  LV SYSTOLIC FUNCTION:                      Normal Ranges: EF-A4C View:    32 % (>=55%) EF-A2C View:    31 % EF-Biplane:     30 % EF-Visual:      15 % LV EF Reported: 15 %  LV DIASTOLIC FUNCTION:            Normal Ranges: MV Peak E:             0.65 m/s   (0.7-1.2 m/s) MV Peak A:             0.31 m/s   (0.42-0.7 m/s) E/A Ratio:             2.13       (1.0-2.2) MV e'                  0.030 m/s  (>8.0) MV lateral e'          0.02  m/s MV medial e'           0.04 m/s MV A Dur:              74.22 msec E/e' Ratio:            21.76      (<8.0)  MITRAL VALVE:          Normal Ranges: MV Vmax:      0.68 m/s (<=1.3m/s) MV peak P.8 mmHg (<5mmHg) MV mean P.0 mmHg (<2mmHg) MV VTI:       7.03 cm  (10-13cm) MV DT:        52 msec  (150-240msec)  AORTIC VALVE:                     Normal Ranges: AoV Vmax:                0.88 m/s (<=1.7m/s) AoV Peak PG:             3.1 mmHg (<20mmHg) AoV Mean P.9 mmHg (1.7-11.5mmHg) LVOT Max Earl:            0.60 m/s (<=1.1m/s) AoV VTI:                 13.95 cm (18-25cm) LVOT VTI:                9.81 cm LVOT Diameter:           1.85 cm  (1.8-2.4cm) AoV Area, VTI:           1.89 cm2 (2.5-5.5cm2) AoV Area,Vmax:           1.84 cm2 (2.5-4.5cm2) AoV Dimensionless Index: 0.70  RIGHT VENTRICLE: RV Basal 2.90 cm RV Mid   1.90 cm RV Major 5.6 cm TAPSE:   9.0 mm RV s'    0.06 m/s  TRICUSPID VALVE/RVSP:          Normal Ranges: Peak TR Velocity:     2.94 m/s Est. RA Pressure:     3 RV Syst Pressure:     38       (< 30mmHg) IVC Diam:             2.00 cm  PULMONIC VALVE:          Normal Ranges: PV Max Earl:     0.3 m/s  (0.6-0.9m/s) PV Max P.4 mmHg  AORTA: Asc Ao Diam 3.18 cm  [id number] Juan Kinney MD Electronically signed on 2025 at 4:59:53 PM  ** Final **            Assessment/Plan   Assessment & Plan  Acute systolic heart failure      Echocardiogram from 2019: LVEF 55-60%        Paroxysmal atrial fibrillation with RVR  -EKG reviewed AF RVR, LBBB  -Given metoprolol 5mg IV x1   -Cont eliquis  -TSH 0.03  -Monitor on tele  -  started amiodarone gtt for a fib with rvr,   8/10 reduced metoprolol to succinate 25 mg daily  continue to monitor on tele, amio gtt stopped and change to 200 mg daily   Continue furosemide gtt, amiodarone gtt change to amio po 200 mg daily, currently in SR. Eliquis held for potential thoracentesis, continue heparin gtt     2. Elevated troponins-Non MI  elevation  -Denies chest pain  -EKG showed AF RVR, LBBB->new since 2019  -Check echo  -Not a candidate for invasive cardiac procedures due to age  -Cont metoprolol and statin     3. Acute CHF, systolic  -  -CXR showed cardiomegaly with basilar edema and moderate sized bilateral effusions  -I reviewed the Echocardiogram from 2019 as above  -Strict I & Os  -Daily weights  -2gm na diet  -Given Lasix IV x1 in ER  -Oral loop diuretics     4. Acute kidney injury  -Most likely cardiorenal  -Hold losartan Cr 1.58 baseline 0.85  -Monitor     5. Hypertension  -stable  -2gm na diet  -cont meds  -monitor     6. Hyperlipidemia  -Cont statin     7. Hypothyroidism  -TSH 0.03  -Resume synthroid at 75 mcg daily     Echo reviewed  1. The left ventricular systolic function is severely decreased with a visually estimated ejection fraction of 15%.   2. There is global hypokinesis of the left ventricle with minor regional variations.   3. Spectral Doppler shows a Grade III (restrictive pattern) of left ventricular diastolic filling with an elevated left atrial pressure.   4. Left ventricular cavity size is mildly dilated.   5. There is moderately reduced right ventricular systolic function.   6. Mildly enlarged right ventricle.   7. The left atrium is mildly dilated.   8. Moderate tricuspid regurgitation visualized.   9. The Doppler estimated RVSP is around 45-50 mm Hg.  10. Mild aortic valve regurgitation.  11. Moderately elevated pulmonary artery pressure.     May benefit from pacemaker implant to allow adequate beta blockade and amiodarone therapy    JEN Gregory-CNP        [1] amiodarone, 200 mg, oral, Daily  [Held by provider] amLODIPine, 5 mg, oral, Daily  [Held by provider] apixaban, 2.5 mg, oral, BID  [Held by provider] levothyroxine, 100 mcg, oral, Daily  [Held by provider] losartan, 100 mg, oral, Daily  metoprolol succinate XL, 25 mg, oral, Daily  polyethylene glycol, 17 g, oral, Daily  potassium chloride, 20 mEq,  intravenous, q2h  pravastatin, 40 mg, oral, Daily  sennosides-docusate sodium, 2 tablet, oral, BID  [2] furosemide, 5 mg/hr, Last Rate: 5 mg/hr (08/10/25 8599)  heparin, 0-4,500 Units/hr, Last Rate: Stopped (08/11/25 6641)  [3] PRN medications: famotidine, heparin, melatonin, ondansetron, oxygen, oxygen

## 2025-08-12 PROBLEM — J90 PLEURAL EFFUSION: Status: ACTIVE | Noted: 2025-08-12

## 2025-08-12 PROBLEM — J96.01 ACUTE HYPOXIC RESPIRATORY FAILURE: Status: ACTIVE | Noted: 2025-08-12

## 2025-08-12 LAB
ALBUMIN SERPL BCP-MCNC: 3 G/DL (ref 3.4–5)
ANION GAP SERPL CALC-SCNC: 12 MMOL/L (ref 10–20)
APTT PPP: 29 SECONDS (ref 26–36)
BUN SERPL-MCNC: 26 MG/DL (ref 6–23)
CALCIUM SERPL-MCNC: 7.9 MG/DL (ref 8.6–10.3)
CHLORIDE SERPL-SCNC: 99 MMOL/L (ref 98–107)
CO2 SERPL-SCNC: 32 MMOL/L (ref 21–32)
CREAT SERPL-MCNC: 1.21 MG/DL (ref 0.5–1.05)
EGFRCR SERPLBLD CKD-EPI 2021: 41 ML/MIN/1.73M*2
ERYTHROCYTE [DISTWIDTH] IN BLOOD BY AUTOMATED COUNT: 14.6 % (ref 11.5–14.5)
GLUCOSE SERPL-MCNC: 92 MG/DL (ref 74–99)
HCT VFR BLD AUTO: 38 % (ref 36–46)
HGB BLD-MCNC: 12.5 G/DL (ref 12–16)
INR PPP: 1.3 (ref 0.9–1.1)
MAGNESIUM SERPL-MCNC: 1.96 MG/DL (ref 1.6–2.4)
MCH RBC QN AUTO: 27.9 PG (ref 26–34)
MCHC RBC AUTO-ENTMCNC: 32.9 G/DL (ref 32–36)
MCV RBC AUTO: 85 FL (ref 80–100)
NRBC BLD-RTO: 0 /100 WBCS (ref 0–0)
PHOSPHATE SERPL-MCNC: 3.9 MG/DL (ref 2.5–4.9)
PLATELET # BLD AUTO: 207 X10*3/UL (ref 150–450)
POTASSIUM SERPL-SCNC: 3.8 MMOL/L (ref 3.5–5.3)
PROTHROMBIN TIME: 14.4 SECONDS (ref 9.8–12.4)
RBC # BLD AUTO: 4.48 X10*6/UL (ref 4–5.2)
SODIUM SERPL-SCNC: 139 MMOL/L (ref 136–145)
UFH PPP CHRO-ACNC: 0.7 IU/ML (ref ?–1.1)
UFH PPP CHRO-ACNC: 0.8 IU/ML (ref ?–1.1)
WBC # BLD AUTO: 5.6 X10*3/UL (ref 4.4–11.3)

## 2025-08-12 PROCEDURE — 2500000005 HC RX 250 GENERAL PHARMACY W/O HCPCS

## 2025-08-12 PROCEDURE — 80069 RENAL FUNCTION PANEL: CPT

## 2025-08-12 PROCEDURE — 85027 COMPLETE CBC AUTOMATED: CPT | Performed by: STUDENT IN AN ORGANIZED HEALTH CARE EDUCATION/TRAINING PROGRAM

## 2025-08-12 PROCEDURE — 2500000002 HC RX 250 W HCPCS SELF ADMINISTERED DRUGS (ALT 637 FOR MEDICARE OP, ALT 636 FOR OP/ED): Performed by: INTERNAL MEDICINE

## 2025-08-12 PROCEDURE — 2500000001 HC RX 250 WO HCPCS SELF ADMINISTERED DRUGS (ALT 637 FOR MEDICARE OP): Performed by: STUDENT IN AN ORGANIZED HEALTH CARE EDUCATION/TRAINING PROGRAM

## 2025-08-12 PROCEDURE — 85520 HEPARIN ASSAY: CPT

## 2025-08-12 PROCEDURE — 85610 PROTHROMBIN TIME: CPT | Performed by: INTERNAL MEDICINE

## 2025-08-12 PROCEDURE — 97165 OT EVAL LOW COMPLEX 30 MIN: CPT | Mod: GO

## 2025-08-12 PROCEDURE — 94760 N-INVAS EAR/PLS OXIMETRY 1: CPT

## 2025-08-12 PROCEDURE — 36415 COLL VENOUS BLD VENIPUNCTURE: CPT | Performed by: STUDENT IN AN ORGANIZED HEALTH CARE EDUCATION/TRAINING PROGRAM

## 2025-08-12 PROCEDURE — 99233 SBSQ HOSP IP/OBS HIGH 50: CPT | Performed by: INTERNAL MEDICINE

## 2025-08-12 PROCEDURE — 83735 ASSAY OF MAGNESIUM: CPT | Performed by: STUDENT IN AN ORGANIZED HEALTH CARE EDUCATION/TRAINING PROGRAM

## 2025-08-12 PROCEDURE — 2500000001 HC RX 250 WO HCPCS SELF ADMINISTERED DRUGS (ALT 637 FOR MEDICARE OP): Performed by: INTERNAL MEDICINE

## 2025-08-12 PROCEDURE — 85730 THROMBOPLASTIN TIME PARTIAL: CPT | Performed by: INTERNAL MEDICINE

## 2025-08-12 PROCEDURE — 97162 PT EVAL MOD COMPLEX 30 MIN: CPT | Mod: GP

## 2025-08-12 PROCEDURE — 36415 COLL VENOUS BLD VENIPUNCTURE: CPT

## 2025-08-12 PROCEDURE — 99233 SBSQ HOSP IP/OBS HIGH 50: CPT | Performed by: FAMILY MEDICINE

## 2025-08-12 PROCEDURE — 2060000001 HC INTERMEDIATE ICU ROOM DAILY

## 2025-08-12 RX ORDER — ACETAMINOPHEN 325 MG/1
650 TABLET ORAL EVERY 6 HOURS PRN
Status: DISCONTINUED | OUTPATIENT
Start: 2025-08-12 | End: 2025-08-15 | Stop reason: HOSPADM

## 2025-08-12 RX ADMIN — PRAVASTATIN SODIUM 40 MG: 40 TABLET ORAL at 08:57

## 2025-08-12 RX ADMIN — AMIODARONE HYDROCHLORIDE 200 MG: 200 TABLET ORAL at 08:56

## 2025-08-12 RX ADMIN — Medication: at 11:50

## 2025-08-12 RX ADMIN — ACETAMINOPHEN 650 MG: 325 TABLET ORAL at 21:31

## 2025-08-12 RX ADMIN — METOPROLOL SUCCINATE 25 MG: 50 TABLET, EXTENDED RELEASE ORAL at 09:01

## 2025-08-12 RX ADMIN — Medication: at 22:55

## 2025-08-12 RX ADMIN — Medication: at 04:11

## 2025-08-12 RX ADMIN — Medication: at 08:47

## 2025-08-12 RX ADMIN — Medication 5 MG: at 21:31

## 2025-08-12 ASSESSMENT — PAIN SCALES - GENERAL
PAINLEVEL_OUTOF10: 0 - NO PAIN

## 2025-08-12 ASSESSMENT — COGNITIVE AND FUNCTIONAL STATUS - GENERAL
HELP NEEDED FOR BATHING: A LOT
STANDING UP FROM CHAIR USING ARMS: A LITTLE
TURNING FROM BACK TO SIDE WHILE IN FLAT BAD: A LITTLE
DRESSING REGULAR LOWER BODY CLOTHING: A LITTLE
STANDING UP FROM CHAIR USING ARMS: A LITTLE
MOVING TO AND FROM BED TO CHAIR: A LITTLE
TOILETING: A LOT
MOVING TO AND FROM BED TO CHAIR: A LITTLE
MOVING FROM LYING ON BACK TO SITTING ON SIDE OF FLAT BED WITH BEDRAILS: A LITTLE
MOBILITY SCORE: 16
DAILY ACTIVITIY SCORE: 16
PERSONAL GROOMING: A LITTLE
TOILETING: A LOT
MOBILITY SCORE: 16
WALKING IN HOSPITAL ROOM: A LOT
DRESSING REGULAR LOWER BODY CLOTHING: A LOT
CLIMB 3 TO 5 STEPS WITH RAILING: A LOT
DRESSING REGULAR UPPER BODY CLOTHING: A LITTLE
DRESSING REGULAR UPPER BODY CLOTHING: A LITTLE
DAILY ACTIVITIY SCORE: 19
CLIMB 3 TO 5 STEPS WITH RAILING: A LOT
TURNING FROM BACK TO SIDE WHILE IN FLAT BAD: A LITTLE
MOVING FROM LYING ON BACK TO SITTING ON SIDE OF FLAT BED WITH BEDRAILS: A LITTLE
WALKING IN HOSPITAL ROOM: A LOT
HELP NEEDED FOR BATHING: A LITTLE

## 2025-08-12 ASSESSMENT — ACTIVITIES OF DAILY LIVING (ADL)
ADL_ASSISTANCE: INDEPENDENT
BATHING_ASSISTANCE: MODERATE
ADL_ASSISTANCE: INDEPENDENT

## 2025-08-12 ASSESSMENT — PAIN - FUNCTIONAL ASSESSMENT
PAIN_FUNCTIONAL_ASSESSMENT: 0-10

## 2025-08-12 NOTE — CARE PLAN
The clinical goals for the shift include Wean oxygen as tolerated, increase mobility as tolerated    Over the shift, the patient did not make progress toward the following goals. Barriers to progression include large bilateral pleural effusions. Recommendations to address these barriers include scheduled thoracentesis today.

## 2025-08-12 NOTE — CARE PLAN
Uneventful night. Pt rested comfortably. Minimal c/o pain this shift. Heparin drip remains off at this time per Dr Villa d/t plans for thoracentesis this morning. Pt remains on airvo, decreased to 50L 50% this shift. Pt continues to progress towards meeting goals. VSS. Will continue to monitor.

## 2025-08-12 NOTE — CARE PLAN
The patient's goals for the shift include sleep    The clinical goals for the shift include airvo settings will continue to decrease this shift

## 2025-08-12 NOTE — PROGRESS NOTES
Occupational Therapy    Evaluation    Patient Name: Susan Singh  MRN: [id number]  Department: [address]  Room: [address] [place] [address]  Today's Date: 8/12/2025  Time Calculation  Start Time: 1426  Stop Time: 1443  Time Calculation (min): 17 min    Assessment  IP OT Assessment  OT Assessment: Pt presents with decreased ADL performance and impaired mobility. Will benefit from skilled OT services at moderate intensity to increase functional outcomes and resume PLOF  Prognosis: Good  Barriers to Discharge Home: Caregiver assistance, Physical needs  Caregiver Assistance: Caregiver assistance needed per identified barriers - however, level of patient's required assistance exceeds assistance available at home  Physical Needs: 24hr mobility assistance needed, 24hr ADL assistance needed  Evaluation/Treatment Tolerance: Patient tolerated treatment well  Medical Staff Made Aware: Yes  End of Session Communication: Bedside nurse  End of Session Patient Position: Bed, 3 rail up, Alarm on  Plan:  Treatment Interventions: ADL retraining, Functional transfer training, UE strengthening/ROM, Endurance training, Patient/family training, Equipment evaluation/education, Compensatory technique education  OT Frequency: 3 times per week (during acute hospitalization)  OT Discharge Recommendations: Moderate intensity level of continued care (Based on current functional status and rehab potential, patient is anticipated to tolerate and benefit from 5 or more days per week of skilled rehabilitative therapy after discharge from this acute inpatient hospitalization.)  Equipment Recommended upon Discharge: Wheeled walker  OT - OK to Discharge: Yes    Subjective     OT Visit Info:  OT Received On: 08/12/25  General Visit Info:  General  Reason for Referral: aczute systolic heart failure; impaired ADL  Referred By: Toña Vergara  Past Medical History Relevant to Rehab: atrial fibrillation on Eliquis, hypertension, hyperlipidemia, hypothyroidism  Family/Caregiver  Present: Yes (grandson and niece)  Co-Treatment: PT  Co-Treatment Reason: Maximize patients functional mobility and outcomes  Prior to Session Communication: Bedside nurse  Patient Position Received: Bed, 3 rail up, Alarm on  General Comment: Pleasant and cooperative with OT eval  Precautions:  Medical Precautions: Fall precautions, Oxygen therapy device and L/min (airvo 50L/50%, tele and continuous pulse ox, heparin therapeutic)     Date/Time Vitals Session Patient Position Pulse Resp SpO2 BP MAP (mmHg)               08/12/25 1426 --  Sitting  --  --  96 %  --  --           Pain:  Pain Assessment  0-10 (Numeric) Pain Score: 0 - No pain    Objective   Cognition:  Overall Cognitive Status: Within Functional Limits  Orientation Level: Oriented X4           Home Living:  Type of Home: House  Lives With: Siblings (brother)  Home Adaptive Equipment: Walker rolling or standard  Home Layout: One level (pt does not access basement)  Home Access: Stairs to enter without rails  Entrance Stairs-Rails: None  Entrance Stairs-Number of Steps: 2   Prior Function:  Receives Help From: Family  ADL Assistance: Independent  Homemaking Assistance: Independent (family assists prn)  Ambulatory Assistance: Independent  Prior Function Comments: Pt admits to at least 4 falls in the past 6 months    ADL:  Eating Assistance: Independent  Grooming Assistance: Minimal  Grooming Deficit: Setup, Increased time to complete  Bathing Assistance: Moderate  UE Dressing Assistance: Minimal  LE Dressing Assistance: Moderate  LE Dressing Deficit: Thread RLE into underwear, Thread LLE into underwear, Pull up over hips (adjusts socks with SBA while seated EOB)  Toileting Assistance with Device: Moderate  Toileting Deficit: Clothing management up, Clothing management down, Perineal hygiene  Activity Tolerance:  Endurance: Tolerates 10 - 20 min exercise with multiple rests  Bed Mobility/Transfers: Bed Mobility  Bed Mobility: Yes  Bed Mobility 1  Bed Mobility  1: Supine to sitting, Sitting to supine  Level of Assistance 1: Close supervision    Transfer 1  Technique 1: Sit to stand, Stand to sit  Transfer Level of Assistance 1: Contact guard, Hand held assistance  Trials/Comments 1: B hand held assist for sit<>stand x2 trials      Functional Mobility:  Functional Mobility  Functional Mobility Performed: Yes  Functional Mobility 1  Surface 1: Level tile  Device 1: No device  Assistance 1: Contact guard, Hand held assistance  Comments 1: lateral steps along EOB, narrow BRI and LOB x1 requiring Kyle to correct    Sensation:  Light Touch: No apparent deficits  Strength:  Strength Comments: BUE functionally 4/5    Outcome Measures: [facility] Daily Activity  Putting on and taking off regular lower body clothing: A lot  Bathing (including washing, rinsing, drying): A lot  Putting on and taking off regular upper body clothing: A little  Toileting, which includes using toilet, bedpan or urinal: A lot  Taking care of personal grooming such as brushing teeth: A little  Eating Meals: None  Daily Activity - Total Score: 16      Education Documentation  Precautions, taught by Tiffany Alexander OT at 8/12/2025  3:37 PM.  Learner: Patient  Readiness: Acceptance  Method: Explanation  Response: Needs Reinforcement  Comment: safety during OOB activity    Goals:   Encounter Problems       Encounter Problems (Active)       ADLs       Patient with complete lower body dressing with stand by assist level of assistance donning and doffing all LE clothes  with PRN adaptive equipment while edge of bed  and standing       Start:  08/12/25    Expected End:  08/26/25            Patient will complete daily grooming tasks brushing teeth and washing face/hair with stand by assist level of assistance and PRN adaptive equipment while edge of bed  and standing.       Start:  08/12/25    Expected End:  08/26/25            Patient will complete toileting including hygiene clothing management/hygiene with stand by  assist level of assistance.       Start:  08/12/25    Expected End:  08/26/25               MOBILITY       Pt will demo functional mobility necessary to complete ADL routine with LRD and SBA       Start:  08/12/25    Expected End:  08/26/25               TRANSFERS       Patient will complete sit to stand transfer with stand by assist level of assistance and least restrictive device in order to improve safety and prepare for out of bed mobility.       Start:  08/12/25    Expected End:  08/26/25

## 2025-08-12 NOTE — HOSPITAL COURSE
Susan Singh is a 89 y.o. female with atrial fibrillation on Eliquis, hypertension, hyperlipidemia, hypothyroidism.  She presented 8/8/25 with 2 days of worsening shortness of breath in the setting of 4 to 5 months of fatigue, lightheadedness, and intermittent syncope.  In the ED, she was found to be in atrial fibrillation with RVR to the 130s and hypoxic requiring 4 L O2.  Labs showed PEPE, elevated BNP, elevated troponin, and low TSH with elevated T4.  Chest x-ray showed cardiomegaly with basilar edema and moderate bilateral effusions.  POCUS in the ED suggested low EF. Cardiology consulted and patient initially started on amiodarone drip and transitioned to oral amiodarone. Metoprolol succinate reduced to 25mg daily and patient was diuresed with lasix drip. She became more hypoxic on 8/10 requiring airvo and transfer to step down unit. VQ scan and DVT US negative. CXR did show worsening edema and bilateral pleural effusions with possible opacities. Pulm consulted, Eliquis held, and started on heparin drip in case of thoracentesis. CT chest wo con showed large bilateral pleural effusions and near total collapse of both lower lobes, patchy ground-glass infiltrate left apex.     Will need outpatient non-con chest CT in 6-12 months for ground glass pulmonary nodule measuring 6mm or larger.

## 2025-08-12 NOTE — PROGRESS NOTES
"    [facility]  Department of Hospital Medicine    PROGRESS NOTE    Susan Singh is a 89 y.o. female on day 3 of admission presenting with Acute systolic heart failure.    Subjective   Patient feeling somewhat better today, remains on Airvo 60L/50%. No other complaints at this time. Heparin assay supratherapuetic, holding heparin drip. Possible thoracentesis tomorrow, patient initially declined today.        Objective     Physical Exam:  Con: awake, alert; no distress;   Eyes: conjunctiva wnl; EOMI;   ENMT: hearing intact; MMM;   MSK: ROM wnl; digits wnl;   CV: Irregular rhythm, mildly elevated JVP; trace LE edema;   Resp: normal work of breathing on HVNI; no cyanosis; diffuse rales  Neuro: moving all extremities; no abnormal movements  Psych: oriented to situation; affect wnl;      Last Recorded Vitals:  /62 (BP Location: Right arm, Patient Position: Lying)   Pulse 78   Temp 36.1 °C (97 °F) (Temporal)   Resp 17   Ht 1.626 m (5' 4\")   Wt 52.7 kg (116 lb 3.2 oz)   SpO2 96%   BMI 19.95 kg/m²      Scheduled medications:  Scheduled Medications[1]  Continuous medications:  Continuous Medications[2]  PRN medications:  PRN Medications[3]     Relevant Results:  Lab Results   Component Value Date    WBC 7.6 08/11/2025    HGB 13.4 08/11/2025    HCT 38.9 08/11/2025    MCV 84 08/11/2025     08/11/2025      Lab Results   Component Value Date    GLUCOSE 146 (H) 08/11/2025    CALCIUM 8.4 (L) 08/11/2025     08/11/2025    K 3.2 (L) 08/11/2025    CO2 28 08/11/2025    CL 99 08/11/2025    BUN 27 (H) 08/11/2025    CREATININE 1.17 (H) 08/11/2025                       I personally reviewed all pertinent labwork, imaging, and vital signs, as well as nursing, therapy, discharge planning, and consult notes.  Discussed with cardiology.     Assessment/Plan   Assessment & Plan  Acute systolic heart failure    Susan Singh is a 89 y.o. female with atrial fibrillation on Eliquis, " hypertension, hyperlipidemia, hypothyroidism.  She presented with 2 days of worsening shortness of breath in the setting of 4 to 5 months of fatigue, lightheadedness, and intermittent syncope.  In the ED, she was found to be in atrial fibrillation with RVR to the 130s and hypoxic requiring 4 L O2.  Labs showed PEPE, elevated BNP, elevated troponin, and low TSH with elevated T4.  Chest x-ray showed cardiomegaly with basilar edema and moderate bilateral effusions.  POCUS in the ED suggested low EF. Cardiology consulted and patient initially started on amiodarone drip. She became more hypoxic on 8/10 requiring airvo and transfer to step down unit. VQ scan and DVT US negative. CXR did show worsening edema and bilateral pleural effusions with possible opacities. Pulm consulted, Eliquis held, and started on heparin drip in case of thoracentesis. CT chest wo con showed large bilateral pleural effusions and near total collapse of both lower lobes, patchy ground-glass infiltrate left apex.      A-fib with RVR:   - Currently in NSR  - Cardiology consulted, S/p amio drip now on amio 200mg daily   - Continue reduced dose of metop succinate 25mg daily   - Per Cardiology may benefit from pacemaker implant, if so would be on Wednesday     Acute decompensated systolic heart failure:   - EF was normal on echo in 2019  - Echo 8/8 showed EF 15%   - IV diuresis w/ lasix drip at 5mg/hr   - strict I/Os, daily weights, telemetry  - Goal K>4, Mg>2  - PT/OT consulted     Acute hypoxic respiratory failure:  Bilateral pleural effusions (R > L):  Ground Glass Pulm Nodule:  - D/t above; worsened requiring transfer to SDU and Airvo on 8/10  - Pt acutely worsened on 8/10, placed on BiPAP  - CXR done 8/10 showed worsening edema w/ bilateral effusions and airspace opacities   - VQ scan ordered 8/10 to r/o PE, negative   - DVT US negative   - CT chest wo con 8/10 showed large effusions and patchy ground glass infiltrate L apex  - Hold Eliquis, on  heparin drip in case of thora but assay currently supratherapeutic   - Pulm consulted: Pt initially declined thora today but then agreeable, possibly tomorrow if heparin assay therapeutic   - Will likely need repeat non-con chest CT in 6-12 months for pulmonary nodule     Elevated troponin: Likely non-MI troponin elevation due to above  - Down trended with better rate control    Lightheadedness with intermittent syncope:  - 2/2 above     PEPE:  - Likely cardiorenal due to above  - Tolerating Lasix so far; suspect actual GFR is much lower given low muscle mass, requiring higher dose of Lasix    Hypertension:   Hyperlipidemia:   - Hold amlodipine, losartan; reintroduce as tolerated  - Continue pravastatin     Hypothyroidism with thyrotoxicosis:   - Potential trigger for above  - Hold levothyroxine for now; decrease dose on discharge     DVT PPx: Heparin drip (holding while assay supratherapeutic)     Dispo: Inpatient, possible thora tomorrow. Anticipate likely 3-4 more midnights pending clinical improvement.          Toña Vergara,              [1] amiodarone, 200 mg, oral, Daily  [Held by provider] amLODIPine, 5 mg, oral, Daily  [Held by provider] apixaban, 2.5 mg, oral, BID  [Held by provider] levothyroxine, 100 mcg, oral, Daily  [Held by provider] losartan, 100 mg, oral, Daily  metoprolol succinate XL, 25 mg, oral, Daily  polyethylene glycol, 17 g, oral, Daily  pravastatin, 40 mg, oral, Daily  sennosides-docusate sodium, 2 tablet, oral, BID     [2] furosemide, 5 mg/hr, Last Rate: 5 mg/hr (08/11/25 2516)  heparin, 0-4,500 Units/hr, Last Rate: Stopped (08/11/25 2107)     [3] PRN medications: famotidine, heparin, melatonin, ondansetron, oxygen, oxygen

## 2025-08-12 NOTE — NURSING NOTE
"Patient's heparin drip has been off since 0730 in the morning. Her assays have been drawn q4h since then. Her assay is finally therapeutic at 0.7. Dr Villa notified.   Per Dr Villa, \"do not restart heparin drip at this time d/t thoracentesis planned for this morning.\"    "

## 2025-08-12 NOTE — PROGRESS NOTES
Physical Therapy    Physical Therapy Evaluation    Patient Name: Susan Singh  MRN: [id number]  Department: [address]  Room: [address] [place] [address]  Today's Date: 8/12/2025   Time Calculation  Start Time: 1425  Stop Time: 1442  Time Calculation (min): 17 min    Assessment/Plan   PT Assessment  PT Assessment Results: Decreased strength, Decreased endurance, Impaired balance, Decreased mobility  Rehab Prognosis: Good  Barriers to Discharge Home: Caregiver assistance, Physical needs  Caregiver Assistance: Caregiver assistance needed per identified barriers - however, level of patient's required assistance exceeds assistance available at home  Physical Needs: Stair navigation into home limited by function/safety, Intermittent mobility assistance needed, Intermittent ADL assistance needed, High falls risk due to function or environment  Evaluation/Treatment Tolerance: Patient tolerated treatment well  Medical Staff Made Aware: Yes  Strengths: Ability to acquire knowledge, Insight into problems, Support of Caregivers  Barriers to Participation: Comorbidities  End of Session Communication: Bedside nurse  Assessment Comment: Patient very pleasant, cooperative and eager to improve. Patient has strong family support, lives with brother, one story home, 2 AISHA without rails. Patient admits to 4 falls over the past 6 months, without injury. Currently requiring airvo, pending thoracentesis and further input from cardio. Patient would continue to benefit from MOD intensity PT intervention.  End of Session Patient Position: Bed, 3 rail up, Alarm on  IP OR SWING BED PT PLAN  Inpatient or Swing Bed: Inpatient  PT Plan  Treatment/Interventions: Bed mobility, Transfer training, Gait training, Stair training, Balance training, Strengthening, Endurance training, Therapeutic exercise  PT Plan: Ongoing PT  PT Frequency: 3 times per week (During hospitalization)  PT Discharge Recommendations: Moderate intensity level of continued care (Based on current  functional status and rehab potential, patient is anticipated to tolerate and benefit from 5 or more days per week of skilled rehabilitative therapy after discharge from this acute inpatient hospitalization.)  PT Recommended Transfer Status: Assist x1  PT - OK to Discharge: Yes (per PT POC)    Subjective     PT Visit Info:  PT Received On: 08/12/25  General Visit Information:  General  Reason for Referral: Impaired functional mobility; heart failure  Referred By: Toña Vergara  Past Medical History Relevant to Rehab: atrial fibrillation on Eliquis, hypertension, hyperlipidemia, hypothyroidism  Family/Caregiver Present: Yes (Grandson and niece)  Co-Treatment: OT  Co-Treatment Reason: Maximize patients functional mobility and outcomes  Prior to Session Communication: Bedside nurse  General Comment: Patient very pleasant, agreeable to therapy assessment  Home Living:  Home Living  Type of Home: House  Lives With: Siblings (Brother)  Home Adaptive Equipment: None  Home Layout: One level (Basement, does not use)  Home Access: Stairs to enter without rails  Entrance Stairs-Rails: None  Entrance Stairs-Number of Steps: 2  Prior Level of Function:  Prior Function Per Pt/Caregiver Report  Level of [place]: Independent with ADLs and functional transfers, Independent with homemaking with ambulation  Receives Help From: Family  ADL Assistance: Independent  Homemaking Assistance: Independent  Ambulatory Assistance: Independent  Prior Function Comments: Patient admits to 4 falls in the past 6 months-no injuries  Precautions:  Precautions  Medical Precautions: Fall precautions (airvo (50/50), tele, heparin therapeutic)      Date/Time Vitals Session Patient Position Pulse Resp SpO2 BP MAP (mmHg)    08/12/25 1425 --  Sitting  --  --  96 %  --  --            Objective   Pain:  Pain Assessment  Pain Assessment: 0-10  0-10 (Numeric) Pain Score: 0 - No pain  Cognition:  Cognition  Overall Cognitive Status: Within Functional  Limits  Orientation Level: Oriented X4    General Assessments:     Activity Tolerance  Endurance: Tolerates 10 - 20 min exercise with multiple rests    Sensation  Light Touch: No apparent deficits    Strength  Strength Comments: B LE 4/5    Coordination  Movements are Fluid and Coordinated: Yes    Postural Control  Postural Control: Within Functional Limits    Static Sitting Balance  Static Sitting-Balance Support: No upper extremity supported, Feet supported  Static Sitting-Level of Assistance: Close supervision  Dynamic Sitting Balance  Dynamic Sitting-Balance Support: No upper extremity supported, Feet supported  Dynamic Sitting-Level of Assistance: Close supervision  Dynamic Sitting-Balance: Forward lean    Static Standing Balance  Static Standing-Balance Support: Bilateral upper extremity supported (HHA)  Static Standing-Level of Assistance: Close supervision  Functional Assessments:       Bed Mobility  Bed Mobility: Yes  Bed Mobility 1  Bed Mobility 1: Supine to sitting, Sitting to supine  Level of Assistance 1: Close supervision    Transfers  Transfer: Yes  Transfer 1  Transfer From 1: Sit to, Stand to  Transfer to 1: Sit, Stand  Technique 1: Sit to stand, Stand to sit  Transfer Level of Assistance 1: Contact guard  Trials/Comments 1: Hand held assist    Ambulation/Gait Training  Ambulation/Gait Training Performed: Yes  Ambulation/Gait Training 1  Surface 1: Level tile  Assistance 1: Hand held assistance  Quality of Gait 1:  (decreased step length/height)  Comments/Distance (ft) 1: 3' (lateral stepping)    Outcome Measures:  [facility] Basic Mobility  Turning from your back to your side while in a flat bed without using bedrails: A little  Moving from lying on your back to sitting on the side of a flat bed without using bedrails: A little  Moving to and from bed to chair (including a wheelchair): A little  Standing up from a chair using your arms (e.g. wheelchair or bedside chair): A little  To walk in hospital  room: A lot  Climbing 3-5 steps with railing: A lot  Basic Mobility - Total Score: 16    Encounter Problems       Encounter Problems (Active)       Balance       STG - Maintains dynamic standing balance without upper extremity support x 5' with dual task SBA   (Progressing)       Start:  08/12/25    Expected End:  08/26/25               Mobility       STG - Patient will ambulate with LRD 25' supervision  (Progressing)       Start:  08/12/25    Expected End:  08/26/25            STG - Patient will ascend and descend 2 stairs with LRD supervision  (Progressing)       Start:  08/12/25    Expected End:  08/26/25               PT Transfers       STG - Patient will perform bed mobility independently  (Progressing)       Start:  08/12/25    Expected End:  08/26/25            STG - Patient will transfer sit to and from stand mod I  (Progressing)       Start:  08/12/25    Expected End:  08/26/25                   Education Documentation  Precautions, taught by Aleida Hendrix, PT at 8/12/2025  3:11 PM.  Learner: Family, Patient  Readiness: Acceptance  Method: Explanation  Response: Verbalizes Understanding  Comment: Importance of progressing mobility with awareness of vitals.    Mobility Training, taught by Aleida Hendrix, PT at 8/12/2025  3:11 PM.  Learner: Family, Patient  Readiness: Acceptance  Method: Explanation  Response: Verbalizes Understanding  Comment: Importance of progressing mobility with awareness of vitals.    Education Comments  No comments found.

## 2025-08-12 NOTE — PROGRESS NOTES
Susan Singh is a 89 y.o. female on day 4 of admission presenting with Acute systolic heart failure.      Subjective   Doing well with no complaints.       Objective     Last Recorded Vitals  /65 (BP Location: Left arm, Patient Position: Lying)   Pulse 76   Temp 36 °C (96.8 °F) (Temporal)   Resp 18   Wt 54 kg (119 lb)   SpO2 97%   Intake/Output last 3 Shifts:    Intake/Output Summary (Last 24 hours) at 8/12/2025 0841  Last data filed at 8/12/2025 0840  Gross per 24 hour   Intake 1113.62 ml   Output 700 ml   Net 413.62 ml       Admission Weight  Weight: 59 kg (130 lb) (08/08/25 0700)    Daily Weight  08/12/25 : 54 kg (119 lb)    Image Results  ECG 12 Lead  Wide QRS tachycardia  Left bundle branch block  Abnormal ECG  When compared with ECG of 08-AUG-2025 06:58, (unconfirmed)  Wide QRS tachycardia has replaced Atrial fibrillation  CT chest wo IV contrast  Narrative: Interpreted By:  Kale Thorpe,   STUDY:  CT CHEST WO IV CONTRAST; 8/10/2025 8:58 pm      INDICATION:  Signs/Symptoms:Rule out pneumonia.      COMPARISON:  None.      ACCESSION NUMBER(S):  [id number]      ORDERING CLINICIAN:  AGUILAR CRUZ      TECHNIQUE:  The study was performed without intravenous contrast material as  requested. A helical acquisition data was obtained with multiplanar  reconstructions.      One or more of the following dose reduction techniques were used:  Automated exposure control Adjustment of the mA and/or kV according  to patient size, and/or use of iterative reconstruction technique.      FINDINGS:  Within the limits of this unenhanced study no hilar or mediastinal  lymphadenopathy is identified.  Mediastinal lymph node calcification  as well as pulmonary calcification and splenic as well as hepatic  calcification is present consistent with remote granulomatous disease.      Large bilateral pleural effusions are present. There is near total  collapse of both lower lobes. There is a patch of ground-glass  infiltrate at  the left apex measuring 2.2 cm. (Series 205, Image 57)      Chest Wall: No chest wall masses are identified.      Skeletal System: No fractures or destructive lesions are identified.      Upper Abdomen: There are multiple left renal hypodensities most  consistent with cysts.      Impression: 1. Large bilateral pleural effusions are present along with near  total collapse of both lower lobes.  2. Remote granulomatous disease of the chest and abdomen.  3. Findings compatible with multiple left renal cysts.  4. Patchy ground-glass infiltrate left apex      MACRO:  Incidental Finding:   A ground glass pulmonary nodule measuring 6 mm  or larger.  (**-YCF-**)      Instructions:  Consider follow up non contrast chest CT at 6-12  months to confirm persistence, then consider CT chest every 2 years  until 5 years. (Sarkis Batres et al., Guidelines for management of  incidental pulmonary nodules detected on CT images: From the  Fleischner Society 2017, Radiology. 2017 Jul;284 (1):228-243.)  [email or web address].[email or web address]      Signed by: Kale Thorpe 8/11/2025 8:37 AM  Dictation workstation:   [id number]  ECG 12 lead  Atrial fibrillation  Left bundle branch block  Abnormal ECG  When compared with ECG of 09-AUG-2025 21:00, (unconfirmed)  Atrial fibrillation has replaced Sinus rhythm  T wave inversion less evident in Lateral leads  ECG 12 Lead  Wide QRS rhythm  Left bundle branch block  Abnormal ECG  When compared with ECG of 10-AUG-2025 16:48, (unconfirmed)  Wide QRS rhythm has replaced Atrial fibrillation      Physical Exam  Con: awake, alert; no distress;   Eyes: conjunctiva wnl; EOMI;   ENMT: hearing intact; MMM;   MSK: ROM wnl; digits wnl;   CV: Regular rhythm  Resp: normal work of breathing on HVNI; no cyanosis; diffuse rales  Neuro: moving all extremities; no abnormal movements  Psych: oriented to situation; affect wnl;      Assessment & Plan  Acute systolic heart failure    Susan Singh is a 89 y.o. female with atrial  fibrillation on Eliquis, hypertension, hyperlipidemia, hypothyroidism.  She presented with 2 days of worsening shortness of breath in the setting of 4 to 5 months of fatigue, lightheadedness, and intermittent syncope.  In the ED, she was found to be in atrial fibrillation with RVR to the 130s and hypoxic requiring 4 L O2.  Labs showed PEPE, elevated BNP, elevated troponin, and low TSH with elevated T4.  Chest x-ray showed cardiomegaly with basilar edema and moderate bilateral effusions.  POCUS in the ED suggested low EF. Cardiology consulted and patient initially started on amiodarone drip. She became more hypoxic on 8/10 requiring airvo and transfer to step down unit. VQ scan and DVT US negative. CXR did show worsening edema and bilateral pleural effusions with possible opacities. Pulm consulted, Eliquis held, and started on heparin drip in case of thoracentesis. CT chest wo con showed large bilateral pleural effusions and near total collapse of both lower lobes, patchy ground-glass infiltrate left apex.      Acute hypoxic respiratory failure due to acute decompensated systolic heart failure:   Requiring transfer to SDU and Airvo on 8/10  VQ scan ordered 8/10 to r/o PE, negative   DVT US negative   CT chest wo con 8/10 showed large effusions and patchy ground glass infiltrate L apex  EF was normal on echo in 2019  Echo 8/8 showed EF 15%   IV diuresis w/ lasix drip at 5mg/hr   Pulm consulted: Planning on thoracentesis later today  strict I/Os, daily weights, telemetry  Goal K>4, Mg>2  PT/OT consulted     A-fib with RVR:   Currently in NSR  Cardiology consulted, S/p amio drip now on amio 200mg daily   Continue reduced dose of metop succinate 25mg daily   Per Cardiology may benefit from pacemaker implant, if so would be on Wednesday      Ground Glass Pulm Nodule:  Will likely need repeat non-con chest CT in 6-12 months for pulmonary nodule      Non-MI troponin elevation due to above  Down trended with better rate  control     Lightheadedness with intermittent syncope:  2/2 above      PEPE:  Likely cardiorenal due to above  Tolerating Lasix so far; suspect actual GFR is much lower given low muscle mass, requiring higher dose of Lasix     Hypertension/Hyperlipidemia:   Hold amlodipine, losartan; reintroduce as tolerated  Continue pravastatin      Hypothyroidism with thyrotoxicosis:  Potential trigger for above  Hold levothyroxine for now; decrease dose on discharge     DVT PPx: Heparin drip held for thoracentesis     Dispo: Inpatient, possible thora tomorrow. Anticipate likely 3-4 more midnights pending clinical improvement.           Antonio Harris, DO

## 2025-08-12 NOTE — PROGRESS NOTES
Susan Singh is a 89 y.o. female on day 4 of admission presenting with Acute systolic heart failure.      Subjective   She is sitting up in the bed, no complaints. Still on Airvo and lasix gtt.     Telemetry reviewed, SR    Review of systems:  Constitutional: negative for fever, chills, or malaise  Neuro: negative for dizziness, headache, numbness, tingling  ENT: Negative for nasal congestion or sore throat  Resp: positive for shortness of breath, cough, or wheezing  CV: negative for chest pain, palpitations  GI: negative for abd pain, nausea, vomiting or diarrhea  : negative for dysuria, frequency, or urgency  Skin: negative for lesions, wounds, or rash  Musculoskeletal: Negative for weakness, myalgia, or arthralgia  Endocrine: Negative for polyuria or polydipsia       Objective   Constitutional: Well developed, awake/alert/oriented x3, no distress, alert and cooperative  Eyes: PERRL, EOMI, clear sclera  ENMT: mucous membranes moist, no apparent injury, no lesions seen  Head/Neck: Neck supple, no apparent injury, thyroid without mass or tenderness, No JVD, trachea midline, no bruits  Respiratory/Thorax: Patent airways, diminished throughout  Cardiovascular: Regular, rate and rhythm, no murmurs, 2+ equal pulses of the extremities, normal S 1and S 2  Gastrointestinal: Nondistended, soft, non-tender, no rebound tenderness or guarding, no masses palpable, no organomegaly, +BS, no bruits  Musculoskeletal: ROM intact, no joint swelling, normal strength  Extremities: normal extremities, no cyanosis edema, contusions or wounds, no clubbing  Neurological: alert and oriented x3, intact senses, motor, response and reflexes, normal strength  Lymphatic: No significant lymphadenopathy  Psychological: Appropriate mood and behavior  Skin: Warm and dry, no lesions, no rashes      Last Recorded Vitals  /66 (BP Location: Left arm, Patient Position: Lying)   Pulse 66   Temp 36.1 °C (97 °F) (Temporal)   Resp 17   Ht 1.626 m  "(5' 4\")   Wt 54 kg (119 lb)   SpO2 100%   BMI 20.43 kg/m²     Intake/Output last 3 Shifts:  I/O last 3 completed shifts:  In: 1099.5 (20.4 mL/kg) [P.O.:840; I.V.:59.5 (1.1 mL/kg); IV Piggyback:200]  Out: 1600 (29.6 mL/kg) [Urine:1600 (0.8 mL/kg/hr)]  Weight: 54 kg   I/O this shift:  In: 600 [P.O.:600]  Out: -     Relevant Results  Scheduled medications  Scheduled Medications[1]  Continuous medications  Continuous Medications[2]  PRN medications  PRN Medications[3]    Results for orders placed or performed during the hospital encounter of 08/08/25 (from the past 24 hours)   Heparin Assay   Result Value Ref Range    Heparin Unfractionated 1.1 (HH) See Comment Below for Therapeutic Ranges IU/mL   Heparin Assay, UFH   Result Value Ref Range    Heparin Unfractionated 0.8 See Comment Below for Therapeutic Ranges IU/mL   Heparin Assay, UFH   Result Value Ref Range    Heparin Unfractionated 0.7 See Comment Below for Therapeutic Ranges IU/mL   CBC   Result Value Ref Range    WBC 5.6 4.4 - 11.3 x10*3/uL    nRBC 0.0 0.0 - 0.0 /100 WBCs    RBC 4.48 4.00 - 5.20 x10*6/uL    Hemoglobin 12.5 12.0 - 16.0 g/dL    Hematocrit 38.0 36.0 - 46.0 %    MCV 85 80 - 100 fL    MCH 27.9 26.0 - 34.0 pg    MCHC 32.9 32.0 - 36.0 g/dL    RDW 14.6 (H) 11.5 - 14.5 %    Platelets 207 150 - 450 x10*3/uL   Magnesium   Result Value Ref Range    Magnesium 1.96 1.60 - 2.40 mg/dL   Renal function panel   Result Value Ref Range    Glucose 92 74 - 99 mg/dL    Sodium 139 136 - 145 mmol/L    Potassium 3.8 3.5 - 5.3 mmol/L    Chloride 99 98 - 107 mmol/L    Bicarbonate 32 21 - 32 mmol/L    Anion Gap 12 10 - 20 mmol/L    Urea Nitrogen 26 (H) 6 - 23 mg/dL    Creatinine 1.21 (H) 0.50 - 1.05 mg/dL    eGFR 41 (L) >60 mL/min/1.73m*2    Calcium 7.9 (L) 8.6 - 10.3 mg/dL    Phosphorus 3.9 2.5 - 4.9 mg/dL    Albumin 3.0 (L) 3.4 - 5.0 g/dL   Heparin Assay   Result Value Ref Range    Heparin Unfractionated 0.8 See Comment Below for Therapeutic Ranges IU/mL   Protime-INR "   Result Value Ref Range    Protime 14.4 (H) 9.8 - 12.4 seconds    INR 1.3 (H) 0.9 - 1.1   APTT   Result Value Ref Range    aPTT 29 26 - 36 seconds       CT chest wo IV contrast   Final Result   1. Large bilateral pleural effusions are present along with near   total collapse of both lower lobes.   2. Remote granulomatous disease of the chest and abdomen.   3. Findings compatible with multiple left renal cysts.   4. Patchy ground-glass infiltrate left apex        MACRO:   Incidental Finding:   A ground glass pulmonary nodule measuring 6 mm   or larger.  (**-YCF-**)        Instructions:  Consider follow up non contrast chest CT at 6-12   months to confirm persistence, then consider CT chest every 2 years   until 5 years. (Sarkis Batres et al., Guidelines for management of   incidental pulmonary nodules detected on CT images: From the   Fleischner Society 2017, Radiology. 2017 Jul;284 (1):228-243.)   FLEISCHNER.ACR.IF.8        Signed by: Kale Thorpe 8/11/2025 8:37 AM   Dictation workstation:   [id number]      Vascular US lower extremity venous duplex bilateral   Final Result   Suboptimal visualization of the calf veins. No sonographic evidence   DVT in the visualized vessels of bilateral lower extremities.        MACRO:   None        Signed by: Jose Jimenez 8/10/2025 7:06 PM   Dictation workstation:   [id number]      NM Lung perfusion with spect/ct   Final Result   The perfusion of both lungs is within normal limits with no evidence   for acute pulmonary embolism.        Moderate bilateral pleural effuion, more severe on right than left             The interpretation above is based on modified PIOPED II and PISAPED   criteria.        This study was analyzed and interpreted at [place], [place].             Signed by: Saul Kingston 8/10/2025 4:35 PM   Dictation workstation:   [id number]      XR chest 1 view   Final Result        Worsening basilar edema with bilateral pleural effusions  and airspace   opacities increasing in size.        No evidence of pneumothorax.        Signed by: Leobardo Leal 8/10/2025 6:19 PM   Dictation workstation:   [id number]      Transthoracic Echo Complete   Final Result      XR chest 1 view   Final Result   Cardiomegaly with basilar edema and moderate-sized bilateral   effusions.        Signed by: Leobardo Leal 8/8/2025 7:35 AM   Dictation workstation:   [id number]      CT head wo IV contrast   Final Result   NO ACUTE INTRACRANIAL PROCESS. SKULL INTACT        MACRO:   None        Signed by: Ranjith Prado 8/8/2025 7:36 AM   Dictation workstation:   [id number]          Transthoracic Echo Complete  Result Date: 8/9/2025   [facility], [address] [place] [address]               Tel [phone] and Fax [phone] TRANSTHORACIC ECHOCARDIOGRAM REPORT  Patient Name:       CHICHI BOYCE        Reading Physician:    [id number] Juan Kinney MD Study Date:         8/8/2025            Ordering Provider:    [id number] HEIDY LUU MRN/PID:            [id number]            Fellow: Accession#:         [id number]        Nurse:                Jackie Alexander RN Date of Birth/Age:  1/28/1928 / 89      Sonographer:          Tomasa luu RDCS Gender assigned at  F                   Additional Staff: Birth: Height:             162.56 cm           Admit Date:           8/8/2025 Weight:             104.33 kg           Admission Status:     Inpatient -                                                               Routine BSA / BMI:          2.08 m2 / 39.48     Encounter#:           [id number]                     kg/m2 Blood Pressure:     139/83 mmHg         Department Location:  [facility] Non                                                               Invasive Study Type:     TRANSTHORACIC ECHO (TTE) COMPLETE Diagnosis/ICD: Acute systolic (congestive) heart failure (CHF)-I50.21 Indication:    Congestive Heart Failure CPT Code:      Echo Complete w Full Doppler-01532 Patient History: Pertinent History: HTN, A-Fib and Dyspnea. Study Detail: The following Echo studies were performed: 2D, M-Mode, Doppler and               color flow. Definity used as a contrast agent for endocardial               border definition. Total contrast used for this procedure was 2.0               mL via IV push. The patient was awake.  PHYSICIAN INTERPRETATION: Left Ventricle: The left ventricular systolic function is severely decreased with a visually estimated ejection fraction of 15%. There is global hypokinesis of the left ventricle with minor regional variations. The left ventricular cavity size is mildly dilated. There is normal septal and normal posterior left ventricular wall thickness. There is left ventricular concentric remodeling. Spectral Doppler shows a Grade III (restrictive pattern) of left ventricular diastolic filling with an elevated left atrial pressure. Left Atrium: The left atrium is mildly dilated. Right Ventricle: The right ventricle is mildly enlarged. There is moderately reduced right ventricular systolic function. Right Atrium: The right atrium is mildly dilated. Aortic Valve: The aortic valve is trileaflet. The aortic valve area by VTI is 1.89 cmÂ² with a peak velocity of 0.88 m/s. The peak and mean gradients are 3 mmHg and 2 mmHg, respectively with a dimensionless index of 0.70. There is moderate aortic valve thickening. There is mild aortic valve regurgitation. Mitral Valve: The mitral valve is normal in structure. The doppler estimated peak and mean diastolic pressure gradients are 1.8 mmHg and 1 mmHg, respectively. The mean gradient of the mitral valve is 1 mmHg. There is mild mitral valve regurgitation. The E Vmax is 0.65 m/s. Tricuspid Valve: The tricuspid valve is structurally  normal. There is moderate tricuspid regurgitation. The Doppler estimated right ventricular systolic pressure (RVSP) is mildly elevated at 38 mmHg. Pulmonic Valve: The pulmonic valve is structurally normal. There is mild pulmonic valve regurgitation. Pericardium: Trivial to small pericardial effusion. Aorta: The aortic root is normal. Pulmonary Artery: The tricuspid regurgitant velocity is 2.94 m/s, and with an estimated right atrial pressure of 3, the estimated pulmonary artery pressure is moderately elevated with the RVSP at 38 mmHg. Systemic Veins: The inferior vena cava appears normal in size, with IVC inspiratory collapse greater than 50%.  CONCLUSIONS:  1. The left ventricular systolic function is severely decreased with a visually estimated ejection fraction of 15%.  2. There is global hypokinesis of the left ventricle with minor regional variations.  3. Spectral Doppler shows a Grade III (restrictive pattern) of left ventricular diastolic filling with an elevated left atrial pressure.  4. Left ventricular cavity size is mildly dilated.  5. There is moderately reduced right ventricular systolic function.  6. Mildly enlarged right ventricle.  7. The left atrium is mildly dilated.  8. Moderate tricuspid regurgitation visualized.  9. The Doppler estimated RVSP is around 45-50 mm Hg. 10. Mild aortic valve regurgitation. 11. Moderately elevated pulmonary artery pressure. QUANTITATIVE DATA SUMMARY:  2D MEASUREMENTS:          Normal Ranges: IVSd:            0.87 cm  (0.6-1.1cm) LVPWd:           0.87 cm  (0.6-1.1cm) LVIDd:           3.35 cm  (3.9-5.9cm) LVIDs:           2.77 cm LV Mass Index:   38 g/m2 LVEDV Index:     27 ml/m2 LV % FS          17.3 %  LEFT ATRIUM:                  Normal Ranges: LA Vol A4C:        52.2 ml    (22+/-6mL/m2) LA Vol A2C:        39.6 ml LA Vol BP:         47.4 ml LA Vol Index A4C:  25.2 ml/m2 LA Vol Index A2C:  19.1 ml/m2 LA Vol Index BP:   22.8 ml/m2 LA Area A4C:       17.7 cm2 LA Area  A2C:       14.8 cm2 LA Major Axis A4C: 5.1 cm LA Major Axis A2C: 4.7 cm LA Volume Index:   22.6 ml/m2 LA Vol A4C:        49.0 ml LA Vol A2C:        36.6 ml LA Vol Index BSA:  20.6 ml/m2  RIGHT ATRIUM:          Normal Ranges: RA Area A4C:  17.6 cm2  AORTA MEASUREMENTS:         Normal Ranges: Ao Sinus, d:        2.90 cm (2.1-3.5cm) Asc Ao, d:          3.20 cm (2.1-3.4cm)  LV SYSTOLIC FUNCTION:                      Normal Ranges: EF-A4C View:    32 % (>=55%) EF-A2C View:    31 % EF-Biplane:     30 % EF-Visual:      15 % LV EF Reported: 15 %  LV DIASTOLIC FUNCTION:            Normal Ranges: MV Peak E:             0.65 m/s   (0.7-1.2 m/s) MV Peak A:             0.31 m/s   (0.42-0.7 m/s) E/A Ratio:             2.13       (1.0-2.2) MV e'                  0.030 m/s  (>8.0) MV lateral e'          0.02 m/s MV medial e'           0.04 m/s MV A Dur:              74.22 msec E/e' Ratio:            21.76      (<8.0)  MITRAL VALVE:          Normal Ranges: MV Vmax:      0.68 m/s (<=1.3m/s) MV peak P.8 mmHg (<5mmHg) MV mean P.0 mmHg (<2mmHg) MV VTI:       7.03 cm  (10-13cm) MV DT:        52 msec  (150-240msec)  AORTIC VALVE:                     Normal Ranges: AoV Vmax:                0.88 m/s (<=1.7m/s) AoV Peak PG:             3.1 mmHg (<20mmHg) AoV Mean P.9 mmHg (1.7-11.5mmHg) LVOT Max Earl:            0.60 m/s (<=1.1m/s) AoV VTI:                 13.95 cm (18-25cm) LVOT VTI:                9.81 cm LVOT Diameter:           1.85 cm  (1.8-2.4cm) AoV Area, VTI:           1.89 cm2 (2.5-5.5cm2) AoV Area,Vmax:           1.84 cm2 (2.5-4.5cm2) AoV Dimensionless Index: 0.70  RIGHT VENTRICLE: RV Basal 2.90 cm RV Mid   1.90 cm RV Major 5.6 cm TAPSE:   9.0 mm RV s'    0.06 m/s  TRICUSPID VALVE/RVSP:          Normal Ranges: Peak TR Velocity:     2.94 m/s Est. RA Pressure:     3 RV Syst Pressure:     38       (< 30mmHg) IVC Diam:             2.00 cm  PULMONIC VALVE:          Normal Ranges: PV Max Earl:     0.3 m/s   (0.6-0.9m/s) PV Max P.4 mmHg  AORTA: Asc Ao Diam 3.18 cm  [id number] Juan Kinney MD Electronically signed on 2025 at 4:59:53 PM  ** Final **            Assessment/Plan   Assessment & Plan  Acute systolic heart failure      Echocardiogram from 2019: LVEF 55-60%    Echo 25 reviewed  1. The left ventricular systolic function is severely decreased with a visually estimated ejection fraction of 15%.   2. There is global hypokinesis of the left ventricle with minor regional variations.   3. Spectral Doppler shows a Grade III (restrictive pattern) of left ventricular diastolic filling with an elevated left atrial pressure.   4. Left ventricular cavity size is mildly dilated.   5. There is moderately reduced right ventricular systolic function.   6. Mildly enlarged right ventricle.   7. The left atrium is mildly dilated.   8. Moderate tricuspid regurgitation visualized.   9. The Doppler estimated RVSP is around 45-50 mm Hg.  10. Mild aortic valve regurgitation.  11. Moderately elevated pulmonary artery pressure.           Paroxysmal atrial fibrillation with RVR  -EKG reviewed AF RVR, LBBB  -Given metoprolol 5mg IV x1   -Cont eliquis  -TSH 0.03  -Monitor on tele  -  started amiodarone gtt for a fib with rvr,   8/10 reduced metoprolol to succinate 25 mg daily  continue to monitor on tele, amio gtt stopped and change to 200 mg daily   Continue furosemide gtt, amiodarone gtt change to amio po 200 mg daily, currently in SR. Eliquis held for potential thoracentesis, continue heparin gtt   HR controlled, maintaining SR with occasional bradycardia into the 40's. May benefit from pacemaker implant to allow adequate beta blockade and amiodarone therapy as outpt     2. Elevated troponins-Non MI elevation  -Denies chest pain  -EKG showed AF RVR, LBBB->new since   -Echo showed EF 15%  -Not a candidate for invasive cardiac procedures due to age  -Cont metoprolol and statin     3. Acute CHF,  systolic  -  -CXR showed cardiomegaly with basilar edema and moderate sized bilateral effusions  -CT chest showed large bilateral pleural effusions with total collapse of both lower lobes  -I reviewed the Echocardiogram from 2019 as above  -Strict I & Os  -Daily weights  -2gm na diet  -Given Lasix IV x1 in ER  -Now on Lasix gtt->cont  -Planning for possible thoracentesis today     4. Acute kidney injury  -Most likely cardiorenal  -Hold losartan  -baseline Cr 0.85  -Monitor     5. Hypertension  -stable  -2gm na diet  -cont meds  -monitor     6. Hyperlipidemia  -Cont statin     7. Hypothyroidism  -TSH 0.03  -Cont synthroid at 75 mcg daily       JEN Barber-CNP          [1] amiodarone, 200 mg, oral, Daily  [Held by provider] amLODIPine, 5 mg, oral, Daily  [Held by provider] apixaban, 2.5 mg, oral, BID  [Held by provider] levothyroxine, 100 mcg, oral, Daily  [Held by provider] losartan, 100 mg, oral, Daily  metoprolol succinate XL, 25 mg, oral, Daily  polyethylene glycol, 17 g, oral, Daily  pravastatin, 40 mg, oral, Daily  sennosides-docusate sodium, 2 tablet, oral, BID     [2] furosemide, 5 mg/hr, Last Rate: 5 mg/hr (08/11/25 1731)  heparin, 0-4,500 Units/hr, Last Rate: Stopped (08/11/25 4741)     [3] PRN medications: famotidine, heparin, melatonin, ondansetron, oxygen

## 2025-08-12 NOTE — PROGRESS NOTES
"Susan Singh is a 89 y.o. female on day 4 of admission presenting with Acute systolic heart failure.    Subjective   States her breathing feels better. O2 requirements decreased.       Objective   GEN: breathing non-labored  ENT: wearing Airvo Nasal Cannula  CV: RRR, no m/g/r  LUNGS: moderate effort, diminished at bases bilaterally    Physical Exam    Last Recorded Vitals  Blood pressure 123/66, pulse 66, temperature 36.1 °C (97 °F), temperature source Temporal, resp. rate 17, height 1.626 m (5' 4\"), weight 54 kg (119 lb), SpO2 96%.  Intake/Output last 3 Shifts:  I/O last 3 completed shifts:  In: 1099.5 (20.4 mL/kg) [P.O.:840; I.V.:59.5 (1.1 mL/kg); IV Piggyback:200]  Out: 1600 (29.6 mL/kg) [Urine:1600 (0.8 mL/kg/hr)]  Weight: 54 kg     Relevant Results  Scheduled medications  amiodarone, 200 mg, oral, Daily  [Held by provider] amLODIPine, 5 mg, oral, Daily  [Held by provider] apixaban, 2.5 mg, oral, BID  [Held by provider] levothyroxine, 100 mcg, oral, Daily  [Held by provider] losartan, 100 mg, oral, Daily  metoprolol succinate XL, 25 mg, oral, Daily  polyethylene glycol, 17 g, oral, Daily  pravastatin, 40 mg, oral, Daily  sennosides-docusate sodium, 2 tablet, oral, BID      Continuous medications  Continuous Medications[1]  PRN medications  PRN Medications[2]               Results for orders placed or performed during the hospital encounter of 08/08/25 (from the past 24 hours)   Heparin Assay   Result Value Ref Range    Heparin Unfractionated 1.1 (HH) See Comment Below for Therapeutic Ranges IU/mL   Heparin Assay, [facility]   Result Value Ref Range    Heparin Unfractionated 0.8 See Comment Below for Therapeutic Ranges IU/mL   Heparin Assay, [facility]   Result Value Ref Range    Heparin Unfractionated 0.7 See Comment Below for Therapeutic Ranges IU/mL   CBC   Result Value Ref Range    WBC 5.6 4.4 - 11.3 x10*3/uL    nRBC 0.0 0.0 - 0.0 /100 WBCs    RBC 4.48 4.00 - 5.20 x10*6/uL    Hemoglobin 12.5 12.0 - 16.0 g/dL    Hematocrit " 38.0 36.0 - 46.0 %    MCV 85 80 - 100 fL    MCH 27.9 26.0 - 34.0 pg    MCHC 32.9 32.0 - 36.0 g/dL    RDW 14.6 (H) 11.5 - 14.5 %    Platelets 207 150 - 450 x10*3/uL   Magnesium   Result Value Ref Range    Magnesium 1.96 1.60 - 2.40 mg/dL   Renal function panel   Result Value Ref Range    Glucose 92 74 - 99 mg/dL    Sodium 139 136 - 145 mmol/L    Potassium 3.8 3.5 - 5.3 mmol/L    Chloride 99 98 - 107 mmol/L    Bicarbonate 32 21 - 32 mmol/L    Anion Gap 12 10 - 20 mmol/L    Urea Nitrogen 26 (H) 6 - 23 mg/dL    Creatinine 1.21 (H) 0.50 - 1.05 mg/dL    eGFR 41 (L) >60 mL/min/1.73m*2    Calcium 7.9 (L) 8.6 - 10.3 mg/dL    Phosphorus 3.9 2.5 - 4.9 mg/dL    Albumin 3.0 (L) 3.4 - 5.0 g/dL   Heparin Assay   Result Value Ref Range    Heparin Unfractionated 0.8 See Comment Below for Therapeutic Ranges IU/mL   Protime-INR   Result Value Ref Range    Protime 14.4 (H) 9.8 - 12.4 seconds    INR 1.3 (H) 0.9 - 1.1   APTT   Result Value Ref Range    aPTT 29 26 - 36 seconds                      Assessment & Plan  Acute systolic heart failure    Acute hypoxic respiratory failure    Pleural effusion    Summary:  89 y.o. female with severe HFrEF admitted on 8/8/2025  6:51 AM for dyspnea, Afib with RVR. She has had increasing O2 demands and is now on Airvo. Performed bedside ultrasound 8/11/2025 which demonstrated bilateral moderate pleural effusions. Patient now agreeable for thoracentesis.       Recommendations:  -continue diuresis, and medical management  -patient agreeable for thoracentesis. Will proceed once factor Xa assays below therapeutic range. Suspect this is due to lingering unmetabolized apixaban. Explained to patient and family members that this finding carries an increased bleeding risk  -Continue supplemental oxygen to goal SpO2 of 90-92%. Currently on 40L/min and 55% FiO2.       Clayton Paredes,   Pulmonary & Critical Care  8/12/2025 3:33 PM         [1] furosemide, 5 mg/hr, Last Rate: 5 mg/hr (08/11/25 2153)  heparin,  0-4,500 Units/hr, Last Rate: Stopped (08/11/25 0741)  [2] PRN medications: famotidine, heparin, melatonin, ondansetron, oxygen

## 2025-08-13 ENCOUNTER — APPOINTMENT (OUTPATIENT)
Dept: RADIOLOGY | Facility: HOSPITAL | Age: OVER 89
DRG: 291 | End: 2025-08-13
Payer: MEDICARE

## 2025-08-13 LAB
ALBUMIN SERPL BCP-MCNC: 2.9 G/DL (ref 3.4–5)
ANION GAP SERPL CALC-SCNC: 12 MMOL/L (ref 10–20)
APTT PPP: 29 SECONDS (ref 26–36)
BUN SERPL-MCNC: 28 MG/DL (ref 6–23)
CALCIUM SERPL-MCNC: 8.1 MG/DL (ref 8.6–10.3)
CHLORIDE SERPL-SCNC: 99 MMOL/L (ref 98–107)
CLARITY FLD: CLEAR
CO2 SERPL-SCNC: 32 MMOL/L (ref 21–32)
COLOR FLD: YELLOW
CREAT SERPL-MCNC: 1.35 MG/DL (ref 0.5–1.05)
EGFRCR SERPLBLD CKD-EPI 2021: 36 ML/MIN/1.73M*2
EOSINOPHIL NFR FLD MANUAL: NORMAL %
ERYTHROCYTE [DISTWIDTH] IN BLOOD BY AUTOMATED COUNT: 14.6 % (ref 11.5–14.5)
GLUCOSE SERPL-MCNC: 97 MG/DL (ref 74–99)
HCT VFR BLD AUTO: 39.3 % (ref 36–46)
HGB BLD-MCNC: 12.3 G/DL (ref 12–16)
INR PPP: 1.2 (ref 0.9–1.1)
LYMPHOCYTES NFR FLD MANUAL: 11 %
MAGNESIUM SERPL-MCNC: 1.91 MG/DL (ref 1.6–2.4)
MCH RBC QN AUTO: 27.8 PG (ref 26–34)
MCHC RBC AUTO-ENTMCNC: 31.3 G/DL (ref 32–36)
MCV RBC AUTO: 89 FL (ref 80–100)
MONOS+MACROS NFR FLD MANUAL: 68 %
NEUTROPHILS NFR FLD MANUAL: 21 %
NRBC BLD-RTO: 0 /100 WBCS (ref 0–0)
PHOSPHATE SERPL-MCNC: 4.2 MG/DL (ref 2.5–4.9)
PLATELET # BLD AUTO: 222 X10*3/UL (ref 150–450)
POTASSIUM SERPL-SCNC: 3.6 MMOL/L (ref 3.5–5.3)
PROTHROMBIN TIME: 12.8 SECONDS (ref 9.8–12.4)
RBC # BLD AUTO: 4.43 X10*6/UL (ref 4–5.2)
RBC # FLD AUTO: 15 /UL
SODIUM SERPL-SCNC: 139 MMOL/L (ref 136–145)
TOTAL CELLS COUNTED FLD: 100
UFH PPP CHRO-ACNC: 0.3 IU/ML (ref ?–1.1)
UFH PPP CHRO-ACNC: 0.3 IU/ML (ref ?–1.1)
UFH PPP CHRO-ACNC: 0.4 IU/ML (ref ?–1.1)
WBC # BLD AUTO: 5.6 X10*3/UL (ref 4.4–11.3)
WBC # FLD AUTO: 297 /UL

## 2025-08-13 PROCEDURE — 2060000001 HC INTERMEDIATE ICU ROOM DAILY

## 2025-08-13 PROCEDURE — 32555 ASPIRATE PLEURA W/ IMAGING: CPT | Performed by: INTERNAL MEDICINE

## 2025-08-13 PROCEDURE — 85520 HEPARIN ASSAY: CPT | Performed by: FAMILY MEDICINE

## 2025-08-13 PROCEDURE — 87075 CULTR BACTERIA EXCEPT BLOOD: CPT | Mod: GEALAB | Performed by: INTERNAL MEDICINE

## 2025-08-13 PROCEDURE — 94760 N-INVAS EAR/PLS OXIMETRY 1: CPT

## 2025-08-13 PROCEDURE — 85610 PROTHROMBIN TIME: CPT | Performed by: FAMILY MEDICINE

## 2025-08-13 PROCEDURE — 84157 ASSAY OF PROTEIN OTHER: CPT | Mod: GEALAB | Performed by: INTERNAL MEDICINE

## 2025-08-13 PROCEDURE — 36415 COLL VENOUS BLD VENIPUNCTURE: CPT | Performed by: INTERNAL MEDICINE

## 2025-08-13 PROCEDURE — 85520 HEPARIN ASSAY: CPT | Performed by: INTERNAL MEDICINE

## 2025-08-13 PROCEDURE — 83986 ASSAY PH BODY FLUID NOS: CPT | Mod: GEALAB | Performed by: INTERNAL MEDICINE

## 2025-08-13 PROCEDURE — 83615 LACTATE (LD) (LDH) ENZYME: CPT | Mod: GEALAB | Performed by: INTERNAL MEDICINE

## 2025-08-13 PROCEDURE — 85027 COMPLETE CBC AUTOMATED: CPT | Performed by: STUDENT IN AN ORGANIZED HEALTH CARE EDUCATION/TRAINING PROGRAM

## 2025-08-13 PROCEDURE — 97535 SELF CARE MNGMENT TRAINING: CPT | Mod: GO,CO

## 2025-08-13 PROCEDURE — 36415 COLL VENOUS BLD VENIPUNCTURE: CPT | Performed by: FAMILY MEDICINE

## 2025-08-13 PROCEDURE — 99233 SBSQ HOSP IP/OBS HIGH 50: CPT | Performed by: INTERNAL MEDICINE

## 2025-08-13 PROCEDURE — 2500000005 HC RX 250 GENERAL PHARMACY W/O HCPCS: Performed by: FAMILY MEDICINE

## 2025-08-13 PROCEDURE — 2500000004 HC RX 250 GENERAL PHARMACY W/ HCPCS (ALT 636 FOR OP/ED): Performed by: FAMILY MEDICINE

## 2025-08-13 PROCEDURE — 97530 THERAPEUTIC ACTIVITIES: CPT | Mod: GO,CO

## 2025-08-13 PROCEDURE — 2500000001 HC RX 250 WO HCPCS SELF ADMINISTERED DRUGS (ALT 637 FOR MEDICARE OP): Performed by: STUDENT IN AN ORGANIZED HEALTH CARE EDUCATION/TRAINING PROGRAM

## 2025-08-13 PROCEDURE — 83735 ASSAY OF MAGNESIUM: CPT | Performed by: STUDENT IN AN ORGANIZED HEALTH CARE EDUCATION/TRAINING PROGRAM

## 2025-08-13 PROCEDURE — 82465 ASSAY BLD/SERUM CHOLESTEROL: CPT | Mod: GEALAB | Performed by: INTERNAL MEDICINE

## 2025-08-13 PROCEDURE — 82042 OTHER SOURCE ALBUMIN QUAN EA: CPT | Mod: GEALAB | Performed by: INTERNAL MEDICINE

## 2025-08-13 PROCEDURE — 99233 SBSQ HOSP IP/OBS HIGH 50: CPT | Performed by: FAMILY MEDICINE

## 2025-08-13 PROCEDURE — 2500000001 HC RX 250 WO HCPCS SELF ADMINISTERED DRUGS (ALT 637 FOR MEDICARE OP): Performed by: INTERNAL MEDICINE

## 2025-08-13 PROCEDURE — 2500000002 HC RX 250 W HCPCS SELF ADMINISTERED DRUGS (ALT 637 FOR MEDICARE OP, ALT 636 FOR OP/ED): Performed by: INTERNAL MEDICINE

## 2025-08-13 PROCEDURE — 0W993ZZ DRAINAGE OF RIGHT PLEURAL CAVITY, PERCUTANEOUS APPROACH: ICD-10-PCS | Performed by: INTERNAL MEDICINE

## 2025-08-13 PROCEDURE — 71045 X-RAY EXAM CHEST 1 VIEW: CPT

## 2025-08-13 PROCEDURE — 89051 BODY FLUID CELL COUNT: CPT | Performed by: INTERNAL MEDICINE

## 2025-08-13 PROCEDURE — 82945 GLUCOSE OTHER FLUID: CPT | Mod: GEALAB | Performed by: INTERNAL MEDICINE

## 2025-08-13 PROCEDURE — 88112 CYTOPATH CELL ENHANCE TECH: CPT | Mod: TC | Performed by: INTERNAL MEDICINE

## 2025-08-13 PROCEDURE — 80069 RENAL FUNCTION PANEL: CPT

## 2025-08-13 PROCEDURE — 85730 THROMBOPLASTIN TIME PARTIAL: CPT | Performed by: FAMILY MEDICINE

## 2025-08-13 RX ORDER — HEPARIN SODIUM 10000 [USP'U]/100ML
0-4000 INJECTION, SOLUTION INTRAVENOUS CONTINUOUS
Status: DISCONTINUED | OUTPATIENT
Start: 2025-08-13 | End: 2025-08-15

## 2025-08-13 RX ADMIN — Medication: at 19:45

## 2025-08-13 RX ADMIN — Medication: at 03:55

## 2025-08-13 RX ADMIN — HEPARIN SODIUM 600 UNITS/HR: 10000 INJECTION, SOLUTION INTRAVENOUS at 18:29

## 2025-08-13 RX ADMIN — PRAVASTATIN SODIUM 40 MG: 40 TABLET ORAL at 08:17

## 2025-08-13 RX ADMIN — Medication: at 22:50

## 2025-08-13 RX ADMIN — Medication 1 DOSE: at 10:07

## 2025-08-13 RX ADMIN — METOPROLOL SUCCINATE 25 MG: 50 TABLET, EXTENDED RELEASE ORAL at 08:17

## 2025-08-13 RX ADMIN — AMIODARONE HYDROCHLORIDE 200 MG: 200 TABLET ORAL at 08:17

## 2025-08-13 ASSESSMENT — COGNITIVE AND FUNCTIONAL STATUS - GENERAL
DRESSING REGULAR LOWER BODY CLOTHING: A LOT
WALKING IN HOSPITAL ROOM: A LOT
WALKING IN HOSPITAL ROOM: A LOT
DAILY ACTIVITIY SCORE: 16
MOBILITY SCORE: 16
DAILY ACTIVITIY SCORE: 20
DRESSING REGULAR LOWER BODY CLOTHING: A LITTLE
DRESSING REGULAR LOWER BODY CLOTHING: A LITTLE
TOILETING: A LITTLE
CLIMB 3 TO 5 STEPS WITH RAILING: A LOT
PERSONAL GROOMING: A LITTLE
MOBILITY SCORE: 16
DRESSING REGULAR UPPER BODY CLOTHING: A LITTLE
TOILETING: A LITTLE
HELP NEEDED FOR BATHING: A LITTLE
DRESSING REGULAR UPPER BODY CLOTHING: A LITTLE
STANDING UP FROM CHAIR USING ARMS: A LITTLE
MOVING TO AND FROM BED TO CHAIR: A LITTLE
HELP NEEDED FOR BATHING: A LITTLE
STANDING UP FROM CHAIR USING ARMS: A LITTLE
MOVING TO AND FROM BED TO CHAIR: A LITTLE
MOVING FROM LYING ON BACK TO SITTING ON SIDE OF FLAT BED WITH BEDRAILS: A LITTLE
MOVING FROM LYING ON BACK TO SITTING ON SIDE OF FLAT BED WITH BEDRAILS: A LITTLE
DAILY ACTIVITIY SCORE: 20
TOILETING: A LOT
CLIMB 3 TO 5 STEPS WITH RAILING: A LOT
TURNING FROM BACK TO SIDE WHILE IN FLAT BAD: A LITTLE
TURNING FROM BACK TO SIDE WHILE IN FLAT BAD: A LITTLE
HELP NEEDED FOR BATHING: A LOT
DRESSING REGULAR UPPER BODY CLOTHING: A LITTLE

## 2025-08-13 ASSESSMENT — ACTIVITIES OF DAILY LIVING (ADL): HOME_MANAGEMENT_TIME_ENTRY: 18

## 2025-08-13 ASSESSMENT — PAIN SCALES - GENERAL
PAINLEVEL_OUTOF10: 0 - NO PAIN

## 2025-08-13 ASSESSMENT — PAIN - FUNCTIONAL ASSESSMENT
PAIN_FUNCTIONAL_ASSESSMENT: 0-10

## 2025-08-13 NOTE — CARE PLAN
The patient's goals for the shift include      The clinical goals for the shift include Pt will deny SOB this shift      Problem: Heart Failure  Goal: Improved gas exchange this shift  Outcome: Progressing  Goal: Improved urinary output this shift  Outcome: Progressing  Goal: Reduction in peripheral edema within 24 hours  Outcome: Progressing  Goal: Report improvement of dyspnea/breathlessness this shift  Outcome: Progressing  Goal: Weight from fluid excess reduced over 2-3 days, then stabilize  Outcome: Progressing  Goal: Increase self care and/or family involvement in 24 hours  Outcome: Progressing     Problem: Pain - Adult  Goal: Verbalizes/displays adequate comfort level or baseline comfort level  Outcome: Progressing     Problem: Safety - Adult  Goal: Free from fall injury  Outcome: Progressing     Problem: Discharge Planning  Goal: Discharge to home or other facility with appropriate resources  Outcome: Progressing     Problem: Chronic Conditions and Co-morbidities  Goal: Patient's chronic conditions and co-morbidity symptoms are monitored and maintained or improved  Outcome: Progressing     Problem: Nutrition  Goal: Nutrient intake appropriate for maintaining nutritional needs  Outcome: Progressing     Problem: Skin  Goal: Participates in plan/prevention/treatment measures  Outcome: Progressing  Flowsheets (Taken 8/12/2025 8285 by Fanny Davidson RN)  Participates in plan/prevention/treatment measures: Elevate heels  Goal: Prevent/manage excess moisture  Outcome: Progressing  Flowsheets (Taken 8/13/2025 1846)  Prevent/manage excess moisture: Cleanse incontinence/protect with barrier cream  Goal: Prevent/minimize sheer/friction injuries  Outcome: Progressing  Flowsheets (Taken 8/12/2025 1955 by Fanny Davidson, RN)  Prevent/minimize sheer/friction injuries:   Use pull sheet   Turn/reposition every 2 hours/use positioning/transfer devices  Goal: Promote/optimize nutrition  Outcome: Progressing  Flowsheets  (Taken 8/13/2025 1846)  Promote/optimize nutrition: Monitor/record intake including meals  Goal: Promote skin healing  Outcome: Progressing  Flowsheets (Taken 8/13/2025 1846)  Promote skin healing:   Protective dressings over bony prominences   Rotate device position/do not position patient on device     Problem: Fall/Injury  Goal: Not fall by end of shift  Outcome: Progressing  Goal: Be free from injury by end of the shift  Outcome: Progressing  Goal: Verbalize understanding of personal risk factors for fall in the hospital  Outcome: Progressing  Goal: Verbalize understanding of risk factor reduction measures to prevent injury from fall in the home  Outcome: Progressing  Goal: Use assistive devices by end of the shift  Outcome: Progressing  Goal: Pace activities to prevent fatigue by end of the shift  Outcome: Progressing     Problem: Respiratory  Goal: Clear secretions with interventions this shift  Outcome: Progressing  Goal: Minimize anxiety/maximize coping throughout shift  Outcome: Progressing  Goal: Minimal/no exertional discomfort or dyspnea this shift  Outcome: Progressing  Goal: No signs of respiratory distress (eg. Use of accessory muscles. Peds grunting)  Outcome: Progressing  Goal: Patent airway maintained this shift  Outcome: Progressing  Goal: Tolerate mechanical ventilation evidenced by VS/agitation level this shift  Outcome: Progressing  Goal: Tolerate pulmonary toileting this shift  Outcome: Progressing  Goal: Verbalize decreased shortness of breath this shift  Outcome: Progressing  Goal: Wean oxygen to maintain O2 saturation per order/standard this shift  Outcome: Progressing  Goal: Increase self care and/or family involvement in next 24 hours  Outcome: Progressing

## 2025-08-13 NOTE — PROGRESS NOTES
Susan Singh is a 89 y.o. female on day 5 of admission presenting with Acute systolic heart failure.      Subjective   No acute events overnight       Objective     Last Recorded Vitals  /67 (BP Location: Left arm, Patient Position: Lying)   Pulse 73   Temp 36.5 °C (97.7 °F) (Temporal)   Resp 16   Wt 54 kg (119 lb)   SpO2 98%   Intake/Output last 3 Shifts:    Intake/Output Summary (Last 24 hours) at 8/13/2025 1218  Last data filed at 8/13/2025 1038  Gross per 24 hour   Intake 535.93 ml   Output 1875 ml   Net -1339.07 ml       Admission Weight  Weight: 59 kg (130 lb) (08/08/25 0700)    Daily Weight  08/12/25 : 54 kg (119 lb)    Image Results  ECG 12 Lead  Wide QRS tachycardia  Left bundle branch block  Abnormal ECG  When compared with ECG of 08-AUG-2025 06:58, (unconfirmed)  Wide QRS tachycardia has replaced Atrial fibrillation  CT chest wo IV contrast  Narrative: Interpreted By:  Kale Thorpe,   STUDY:  CT CHEST WO IV CONTRAST; 8/10/2025 8:58 pm      INDICATION:  Signs/Symptoms:Rule out pneumonia.      COMPARISON:  None.      ACCESSION NUMBER(S):  [id number]      ORDERING CLINICIAN:  AGUILAR CRUZ      TECHNIQUE:  The study was performed without intravenous contrast material as  requested. A helical acquisition data was obtained with multiplanar  reconstructions.      One or more of the following dose reduction techniques were used:  Automated exposure control Adjustment of the mA and/or kV according  to patient size, and/or use of iterative reconstruction technique.      FINDINGS:  Within the limits of this unenhanced study no hilar or mediastinal  lymphadenopathy is identified.  Mediastinal lymph node calcification  as well as pulmonary calcification and splenic as well as hepatic  calcification is present consistent with remote granulomatous disease.      Large bilateral pleural effusions are present. There is near total  collapse of both lower lobes. There is a patch of ground-glass  infiltrate at  the left apex measuring 2.2 cm. (Series 205, Image 57)      Chest Wall: No chest wall masses are identified.      Skeletal System: No fractures or destructive lesions are identified.      Upper Abdomen: There are multiple left renal hypodensities most  consistent with cysts.      Impression: 1. Large bilateral pleural effusions are present along with near  total collapse of both lower lobes.  2. Remote granulomatous disease of the chest and abdomen.  3. Findings compatible with multiple left renal cysts.  4. Patchy ground-glass infiltrate left apex      MACRO:  Incidental Finding:   A ground glass pulmonary nodule measuring 6 mm  or larger.  (**-YCF-**)      Instructions:  Consider follow up non contrast chest CT at 6-12  months to confirm persistence, then consider CT chest every 2 years  until 5 years. (Sarkis Batres et al., Guidelines for management of  incidental pulmonary nodules detected on CT images: From the  Fleischner Society 2017, Radiology. 2017 Jul;284 (1):228-243.)  [id number]      Signed by: Kale Thorpe 8/11/2025 8:37 AM  Dictation workstation:   [id number]  ECG 12 lead  Atrial fibrillation  Left bundle branch block  Abnormal ECG  When compared with ECG of 09-AUG-2025 21:00, (unconfirmed)  Atrial fibrillation has replaced Sinus rhythm  T wave inversion less evident in Lateral leads  ECG 12 Lead  Wide QRS rhythm  Left bundle branch block  Abnormal ECG  When compared with ECG of 10-AUG-2025 16:48, (unconfirmed)  Wide QRS rhythm has replaced Atrial fibrillation      Physical Exam  Con: awake, alert; no distress;   Eyes: conjunctiva wnl; EOMI;   ENMT: hearing intact; MMM;   MSK: ROM wnl; digits wnl;   CV: Regular rhythm  Resp: normal work of breathing on HVNI; no cyanosis; diffuse rales  Neuro: moving all extremities; no abnormal movements  Psych: oriented to situation; affect wnl;      Assessment & Plan  Acute systolic heart failure    Acute hypoxic respiratory failure    Pleural  effusion    Susan Singh is a 89 y.o. female with atrial fibrillation on Eliquis, hypertension, hyperlipidemia, hypothyroidism.  She presented with 2 days of worsening shortness of breath in the setting of 4 to 5 months of fatigue, lightheadedness, and intermittent syncope.  In the ED, she was found to be in atrial fibrillation with RVR to the 130s and hypoxic requiring 4 L O2.  Labs showed PEPE, elevated BNP, elevated troponin, and low TSH with elevated T4.  Chest x-ray showed cardiomegaly with basilar edema and moderate bilateral effusions.  POCUS in the ED suggested low EF. Cardiology consulted and patient initially started on amiodarone drip. She became more hypoxic on 8/10 requiring airvo and transfer to step down unit. VQ scan and DVT US negative. CXR did show worsening edema and bilateral pleural effusions with possible opacities. Pulm consulted, Eliquis held, and started on heparin drip in case of thoracentesis. CT chest wo con showed large bilateral pleural effusions and near total collapse of both lower lobes, patchy ground-glass infiltrate left apex.      Acute hypoxic respiratory failure due to acute decompensated systolic heart failure:   Weaned off Airvo now on supplemental oxygen 10 L a minute  VQ scan ordered 8/10 to r/o PE, negative   DVT US negative   CT chest wo con 8/10 showed large effusions and patchy ground glass infiltrate L apex  EF was normal on echo in 2019  Echo 8/8 showed EF 15%   IV diuresis w/ lasix drip at 5mg/hr   Pulm consulted: Planning on thoracentesis later today  Net output of 934.1 since admission,(not accurate do to urine loss around pure wick)  Goal K>4, Mg>2  PT/OT consulted     A-fib with RVR:   Currently in NSR  Cardiology consulted, S/p amio drip now on amio 200mg daily   Continue reduced dose of metop succinate 25mg daily   Per Cardiology may benefit from pacemaker implant, if so would be on Wednesday      Ground Glass Pulm Nodule:  Will likely need repeat non-con chest  CT in 6-12 months for pulmonary nodule      Non-MI troponin elevation due to above  Down trended with better rate control     Lightheadedness with intermittent syncope:  2/2 above      PEPE:  Likely cardiorenal due to above  Serum creatinine has slightly trended up  In review of chart serum creatinine baseline appears to be around 1.0  Repeat renal function panel in a.m.     Hypertension/Hyperlipidemia:   Hold amlodipine, losartan; reintroduce as tolerated  Continue pravastatin      Hypothyroidism with thyrotoxicosis:  Potential trigger for above  Hold levothyroxine for now; decrease dose on discharge     DVT PPx: Heparin drip held for thoracentesis     Dispo: Inpatient, possible thora tomorrow. Anticipate likely 3-4 more midnights pending clinical improvement.           Antonio Harris, DO

## 2025-08-13 NOTE — PROGRESS NOTES
08/13/25 1024   Discharge Planning   Living Arrangements Family members  (brother tammi)   Support Systems Family members;Friends/neighbors   Assistance Needed A&OX4; dependent on assist as of recent for ADLs with walker; doesn't drive; room air baseline-currently requiring Airvo/denies CPAP; PCP Dr ABDELRAHMAN Santacruz   Type of Residence Private residence   Number of Stairs to Enter Residence 5   Number of Stairs Within Residence 12  (to basement)   Do you have animals or pets at home? No   Who is requesting discharge planning? Provider   Home or Post Acute Services In home services   Type of Home Care Services Home OT;Home PT;Home nursing visits   Expected Discharge Disposition Home H  (declined SNF. Open to HHC. Choices provided. Preference [facility], provider aware for need of internal referral order.)   Patient Choice   Provider Choice list and CMS website (https://medicare.gov/care-compare#search) for post-acute Quality and Resource Measure Data were provided and reviewed with: Patient

## 2025-08-13 NOTE — PROCEDURES
Procedure: Thoracentesis    Indication: acute hypoxic respiratory failure    Procedure narrative: After informed consent was obtained, patient was positioned upright. Ultrasound was used to locate a pocket of fluid. Fluid appeared simple on ultrasound. Patient's right side was then prepped and draped in sterile fashion. 5 mL of 1% lidocaine used for local anesthesia. A small incision was made with #11 scalpel blade. Next, an 8Fr catheter over needle was passed into the right pleural space and yellow fluid was aspirated. Catheter was advanced and needle was removed. A total of 900 mL fluid was drained and sent for cytology, culture, cell count, and chemistries. Catheter was removed and bandage applied after hemostasis achieved. EBL was minimal. Patient tolerated procedure well without immediate complications. Post-procedure ultrasound and chest x-ray demonstrated resolution of right pleural effusion without pneumothorax    Recommendations: Follow up pleural fluid studies

## 2025-08-13 NOTE — CARE PLAN
Problem: Heart Failure  Goal: Reduction in peripheral edema within 24 hours  Outcome: Progressing     Problem: Pain - Adult  Goal: Verbalizes/displays adequate comfort level or baseline comfort level  Outcome: Progressing     Problem: Safety - Adult  Goal: Free from fall injury  Outcome: Progressing     Problem: Fall/Injury  Goal: Not fall by end of shift  Outcome: Progressing  Goal: Be free from injury by end of the shift  Outcome: Progressing     Problem: Respiratory  Goal: No signs of respiratory distress (eg. Use of accessory muscles. Peds grunting)  Outcome: Progressing     Problem: Skin  Goal: Participates in plan/prevention/treatment measures  Flowsheets (Taken 8/12/2025 2353)  Participates in plan/prevention/treatment measures: Elevate heels  Goal: Prevent/manage excess moisture  Flowsheets (Taken 8/12/2025 2353)  Prevent/manage excess moisture: Cleanse incontinence/protect with barrier cream  Goal: Prevent/minimize sheer/friction injuries  Flowsheets (Taken 8/12/2025 2353)  Prevent/minimize sheer/friction injuries:   Use pull sheet   Turn/reposition every 2 hours/use positioning/transfer devices  Goal: Promote/optimize nutrition  Flowsheets (Taken 8/12/2025 2353)  Promote/optimize nutrition: Monitor/record intake including meals  Goal: Promote skin healing  Flowsheets (Taken 8/12/2025 2353)  Promote skin healing:   Turn/reposition every 2 hours/use positioning/transfer devices   Rotate device position/do not position patient on device   The patient's goals for the shift include      The clinical goals for the shift include patient will deny shortness of breath this shift    Over the shift, the patient did not make progress toward the following goals. Barriers to progression include forgetful. Recommendations to address these barriers include family involvement in care.

## 2025-08-13 NOTE — PROGRESS NOTES
Susan Singh is a 89 y.o. female on day 5 of admission presenting with Acute systolic heart failure.      Subjective   She is sitting up in the bed, no complaints. Currently on O2 at 10L per high flow NC.     Telemetry reviewed, SR    Review of systems:  Constitutional: negative for fever, chills, or malaise  Neuro: negative for dizziness, headache, numbness, tingling  ENT: Negative for nasal congestion or sore throat  Resp: positive for shortness of breath, cough, or wheezing  CV: negative for chest pain, palpitations  GI: negative for abd pain, nausea, vomiting or diarrhea  : negative for dysuria, frequency, or urgency  Skin: negative for lesions, wounds, or rash  Musculoskeletal: Negative for weakness, myalgia, or arthralgia  Endocrine: Negative for polyuria or polydipsia       Objective   Constitutional: Well developed, awake/alert/oriented x3, no distress, alert and cooperative  Eyes: PERRL, EOMI, clear sclera  ENMT: mucous membranes moist, no apparent injury, no lesions seen  Head/Neck: Neck supple, no apparent injury, thyroid without mass or tenderness, No JVD, trachea midline, no bruits  Respiratory/Thorax: Patent airways, diminished throughout  Cardiovascular: Regular, rate and rhythm, no murmurs, 2+ equal pulses of the extremities, normal S 1and S 2  Gastrointestinal: Nondistended, soft, non-tender, no rebound tenderness or guarding, no masses palpable, no organomegaly, +BS, no bruits  Musculoskeletal: ROM intact, no joint swelling, normal strength  Extremities: normal extremities, no cyanosis edema, contusions or wounds, no clubbing  Neurological: alert and oriented x3, intact senses, motor, response and reflexes, normal strength  Lymphatic: No significant lymphadenopathy  Psychological: Appropriate mood and behavior  Skin: Warm and dry, no lesions, no rashes      Last Recorded Vitals  /67 (BP Location: Left arm, Patient Position: Lying)   Pulse 73   Temp 36.5 °C (97.7 °F) (Temporal)   Resp 16   " Ht 1.626 m (5' 4\")   Wt 54 kg (119 lb)   SpO2 98%   BMI 20.43 kg/m²     Intake/Output last 3 Shifts:  I/O last 3 completed shifts:  In: 957.5 (17.7 mL/kg) [P.O.:940; I.V.:17.5 (0.3 mL/kg)]  Out: 2325 (43.1 mL/kg) [Urine:2325 (1.2 mL/kg/hr)]  Weight: 54 kg   I/O this shift:  In: 360 [P.O.:360]  Out: 250 [Urine:250]    Relevant Results  Scheduled medications  Scheduled Medications[1]  Continuous medications  Continuous Medications[2]  PRN medications  PRN Medications[3]    Results for orders placed or performed during the hospital encounter of 08/08/25 (from the past 24 hours)   CBC   Result Value Ref Range    WBC 5.6 4.4 - 11.3 x10*3/uL    nRBC 0.0 0.0 - 0.0 /100 WBCs    RBC 4.43 4.00 - 5.20 x10*6/uL    Hemoglobin 12.3 12.0 - 16.0 g/dL    Hematocrit 39.3 36.0 - 46.0 %    MCV 89 80 - 100 fL    MCH 27.8 26.0 - 34.0 pg    MCHC 31.3 (L) 32.0 - 36.0 g/dL    RDW 14.6 (H) 11.5 - 14.5 %    Platelets 222 150 - 450 x10*3/uL   Magnesium   Result Value Ref Range    Magnesium 1.91 1.60 - 2.40 mg/dL   Renal function panel   Result Value Ref Range    Glucose 97 74 - 99 mg/dL    Sodium 139 136 - 145 mmol/L    Potassium 3.6 3.5 - 5.3 mmol/L    Chloride 99 98 - 107 mmol/L    Bicarbonate 32 21 - 32 mmol/L    Anion Gap 12 10 - 20 mmol/L    Urea Nitrogen 28 (H) 6 - 23 mg/dL    Creatinine 1.35 (H) 0.50 - 1.05 mg/dL    eGFR 36 (L) >60 mL/min/1.73m*2    Calcium 8.1 (L) 8.6 - 10.3 mg/dL    Phosphorus 4.2 2.5 - 4.9 mg/dL    Albumin 2.9 (L) 3.4 - 5.0 g/dL   Heparin Assay   Result Value Ref Range    Heparin Unfractionated 0.3 See Comment Below for Therapeutic Ranges IU/mL       CT chest wo IV contrast   Final Result   1. Large bilateral pleural effusions are present along with near   total collapse of both lower lobes.   2. Remote granulomatous disease of the chest and abdomen.   3. Findings compatible with multiple left renal cysts.   4. Patchy ground-glass infiltrate left apex        MACRO:   Incidental Finding:   A ground glass " pulmonary nodule measuring 6 mm   or larger.  (**-YCF-**)        Instructions:  Consider follow up non contrast chest CT at 6-12   months to confirm persistence, then consider CT chest every 2 years   until 5 years. (Sarkis Batres et al., Guidelines for management of   incidental pulmonary nodules detected on CT images: From the   Fleischner Society 2017, Radiology. 2017 Jul;284 (1):228-243.)   [id number]        Signed by: Kale Thorpe 8/11/2025 8:37 AM   Dictation workstation:   [id number]      Vascular US lower extremity venous duplex bilateral   Final Result   Suboptimal visualization of the calf veins. No sonographic evidence   DVT in the visualized vessels of bilateral lower extremities.        MACRO:   None        Signed by: Jose Jimenez 8/10/2025 7:06 PM   Dictation workstation:   [id number]      NM Lung perfusion with spect/ct   Final Result   The perfusion of both lungs is within normal limits with no evidence   for acute pulmonary embolism.        Moderate bilateral pleural effuion, more severe on right than left             The interpretation above is based on modified PIOPED II and PISAPED   criteria.        This study was analyzed and interpreted at [place], [place].             Signed by: Saul Kingston 8/10/2025 4:35 PM   Dictation workstation:   [id number]      XR chest 1 view   Final Result        Worsening basilar edema with bilateral pleural effusions and airspace   opacities increasing in size.        No evidence of pneumothorax.        Signed by: Leobardo Leal 8/10/2025 6:19 PM   Dictation workstation:   [id number]      Transthoracic Echo Complete   Final Result      XR chest 1 view   Final Result   Cardiomegaly with basilar edema and moderate-sized bilateral   effusions.        Signed by: Leobardo Leal 8/8/2025 7:35 AM   Dictation workstation:   [id number]      CT head wo IV contrast   Final Result   NO ACUTE INTRACRANIAL PROCESS. SKULL INTACT         MACRO:   None        Signed by: Ranjith Johan 8/8/2025 7:36 AM   Dictation workstation:   [id number]          Transthoracic Echo Complete  Result Date: 8/9/2025   [facility], [address] [place] [address]               Tel [phone] and Fax [phone] TRANSTHORACIC ECHOCARDIOGRAM REPORT  Patient Name:       CHICHI BOYCE        Reading Physician:    [id number] Juan Kinney MD Study Date:         8/8/2025            Ordering Provider:    [id number] LUU MRN/PID:            [id number]            Fellow: Accession#:         [id number]        Nurse:                Jackie Alexander RN Date of Birth/Age:  1/28/1928 / 89      Sonographer:          Tomasa luu RDCS Gender assigned at  F                   Additional Staff: Birth: Height:             162.56 cm           Admit Date:           8/8/2025 Weight:             104.33 kg           Admission Status:     Inpatient -                                                               Routine BSA / BMI:          2.08 m2 / 39.48     Encounter#:           [id number]                     kg/m2 Blood Pressure:     139/83 mmHg         Department Location:  [facility] Non                                                               Invasive Study Type:    TRANSTHORACIC ECHO (TTE) COMPLETE Diagnosis/ICD: Acute systolic (congestive) heart failure (CHF)-I50.21 Indication:    Congestive Heart Failure CPT Code:      Echo Complete w Full Doppler-[id number] Patient History: Pertinent History: HTN, A-Fib and Dyspnea. Study Detail: The following Echo studies were performed: 2D, M-Mode, Doppler and               color flow. Definity used as a contrast agent for endocardial               border definition. Total contrast used for this procedure was 2.0               mL via IV  push. The patient was awake.  PHYSICIAN INTERPRETATION: Left Ventricle: The left ventricular systolic function is severely decreased with a visually estimated ejection fraction of 15%. There is global hypokinesis of the left ventricle with minor regional variations. The left ventricular cavity size is mildly dilated. There is normal septal and normal posterior left ventricular wall thickness. There is left ventricular concentric remodeling. Spectral Doppler shows a Grade III (restrictive pattern) of left ventricular diastolic filling with an elevated left atrial pressure. Left Atrium: The left atrium is mildly dilated. Right Ventricle: The right ventricle is mildly enlarged. There is moderately reduced right ventricular systolic function. Right Atrium: The right atrium is mildly dilated. Aortic Valve: The aortic valve is trileaflet. The aortic valve area by VTI is 1.89 cmÂ² with a peak velocity of 0.88 m/s. The peak and mean gradients are 3 mmHg and 2 mmHg, respectively with a dimensionless index of 0.70. There is moderate aortic valve thickening. There is mild aortic valve regurgitation. Mitral Valve: The mitral valve is normal in structure. The doppler estimated peak and mean diastolic pressure gradients are 1.8 mmHg and 1 mmHg, respectively. The mean gradient of the mitral valve is 1 mmHg. There is mild mitral valve regurgitation. The E Vmax is 0.65 m/s. Tricuspid Valve: The tricuspid valve is structurally normal. There is moderate tricuspid regurgitation. The Doppler estimated right ventricular systolic pressure (RVSP) is mildly elevated at 38 mmHg. Pulmonic Valve: The pulmonic valve is structurally normal. There is mild pulmonic valve regurgitation. Pericardium: Trivial to small pericardial effusion. Aorta: The aortic root is normal. Pulmonary Artery: The tricuspid regurgitant velocity is 2.94 m/s, and with an estimated right atrial pressure of 3, the estimated pulmonary artery pressure is moderately elevated  with the RVSP at 38 mmHg. Systemic Veins: The inferior vena cava appears normal in size, with IVC inspiratory collapse greater than 50%.  CONCLUSIONS:  1. The left ventricular systolic function is severely decreased with a visually estimated ejection fraction of 15%.  2. There is global hypokinesis of the left ventricle with minor regional variations.  3. Spectral Doppler shows a Grade III (restrictive pattern) of left ventricular diastolic filling with an elevated left atrial pressure.  4. Left ventricular cavity size is mildly dilated.  5. There is moderately reduced right ventricular systolic function.  6. Mildly enlarged right ventricle.  7. The left atrium is mildly dilated.  8. Moderate tricuspid regurgitation visualized.  9. The Doppler estimated RVSP is around 45-50 mm Hg. 10. Mild aortic valve regurgitation. 11. Moderately elevated pulmonary artery pressure. QUANTITATIVE DATA SUMMARY:  2D MEASUREMENTS:          Normal Ranges: IVSd:            0.87 cm  (0.6-1.1cm) LVPWd:           0.87 cm  (0.6-1.1cm) LVIDd:           3.35 cm  (3.9-5.9cm) LVIDs:           2.77 cm LV Mass Index:   38 g/m2 LVEDV Index:     27 ml/m2 LV % FS          17.3 %  LEFT ATRIUM:                  Normal Ranges: LA Vol A4C:        52.2 ml    (22+/-6mL/m2) LA Vol A2C:        39.6 ml LA Vol BP:         47.4 ml LA Vol Index A4C:  25.2 ml/m2 LA Vol Index A2C:  19.1 ml/m2 LA Vol Index BP:   22.8 ml/m2 LA Area A4C:       17.7 cm2 LA Area A2C:       14.8 cm2 LA Major Axis A4C: 5.1 cm LA Major Axis A2C: 4.7 cm LA Volume Index:   22.6 ml/m2 LA Vol A4C:        49.0 ml LA Vol A2C:        36.6 ml LA Vol Index BSA:  20.6 ml/m2  RIGHT ATRIUM:          Normal Ranges: RA Area A4C:  17.6 cm2  AORTA MEASUREMENTS:         Normal Ranges: Ao Sinus, d:        2.90 cm (2.1-3.5cm) Asc Ao, d:          3.20 cm (2.1-3.4cm)  LV SYSTOLIC FUNCTION:                      Normal Ranges: EF-A4C View:    32 % (>=55%) EF-A2C View:    31 % EF-Biplane:     30 % EF-Visual:       15 % LV EF Reported: 15 %  LV DIASTOLIC FUNCTION:            Normal Ranges: MV Peak E:             0.65 m/s   (0.7-1.2 m/s) MV Peak A:             0.31 m/s   (0.42-0.7 m/s) E/A Ratio:             2.13       (1.0-2.2) MV e'                  0.030 m/s  (>8.0) MV lateral e'          0.02 m/s MV medial e'           0.04 m/s MV A Dur:              74.22 msec E/e' Ratio:            21.76      (<8.0)  MITRAL VALVE:          Normal Ranges: MV Vmax:      0.68 m/s (<=1.3m/s) MV peak P.8 mmHg (<5mmHg) MV mean P.0 mmHg (<2mmHg) MV VTI:       7.03 cm  (10-13cm) MV DT:        52 msec  (150-240msec)  AORTIC VALVE:                     Normal Ranges: AoV Vmax:                0.88 m/s (<=1.7m/s) AoV Peak PG:             3.1 mmHg (<20mmHg) AoV Mean P.9 mmHg (1.7-11.5mmHg) LVOT Max Earl:            0.60 m/s (<=1.1m/s) AoV VTI:                 13.95 cm (18-25cm) LVOT VTI:                9.81 cm LVOT Diameter:           1.85 cm  (1.8-2.4cm) AoV Area, VTI:           1.89 cm2 (2.5-5.5cm2) AoV Area,Vmax:           1.84 cm2 (2.5-4.5cm2) AoV Dimensionless Index: 0.70  RIGHT VENTRICLE: RV Basal 2.90 cm RV Mid   1.90 cm RV Major 5.6 cm TAPSE:   9.0 mm RV s'    0.06 m/s  TRICUSPID VALVE/RVSP:          Normal Ranges: Peak TR Velocity:     2.94 m/s Est. RA Pressure:     3 RV Syst Pressure:     38       (< 30mmHg) IVC Diam:             2.00 cm  PULMONIC VALVE:          Normal Ranges: PV Max Earl:     0.3 m/s  (0.6-0.9m/s) PV Max P.4 mmHg  AORTA: Asc Ao Diam 3.18 cm  [id number] Juan Kinney MD Electronically signed on 2025 at 4:59:53 PM  ** Final **            Assessment/Plan   Assessment & Plan  Acute systolic heart failure    Acute hypoxic respiratory failure    Pleural effusion      Echocardiogram from 2019: LVEF 55-60%    Echo 25 reviewed  1. The left ventricular systolic function is severely decreased with a visually estimated ejection fraction of 15%.   2. There is global hypokinesis of the left  ventricle with minor regional variations.   3. Spectral Doppler shows a Grade III (restrictive pattern) of left ventricular diastolic filling with an elevated left atrial pressure.   4. Left ventricular cavity size is mildly dilated.   5. There is moderately reduced right ventricular systolic function.   6. Mildly enlarged right ventricle.   7. The left atrium is mildly dilated.   8. Moderate tricuspid regurgitation visualized.   9. The Doppler estimated RVSP is around 45-50 mm Hg.  10. Mild aortic valve regurgitation.  11. Moderately elevated pulmonary artery pressure.           Paroxysmal atrial fibrillation with RVR  -EKG reviewed AF RVR, LBBB  -Given metoprolol 5mg IV x1   -TSH 0.03  -Monitor on tele  - 8/9 started amiodarone gtt for a fib with rvr,   8/10 reduced metoprolol to succinate 25 mg daily  continue to monitor on tele, amio gtt stopped and change to 200 mg daily  8/11 Continue furosemide gtt, amiodarone gtt change to amio po 200 mg daily, currently in SR. Eliquis held for potential thoracentesis, continue heparin gtt  8/11 HR controlled, maintaining SR with occasional bradycardia into the 40's. May benefit from pacemaker implant to allow adequate beta blockade and amiodarone therapy as outpt. Eliquis on hold for thoracentesis, cont heparin gtt     2. Elevated troponins-Non MI elevation  -Denies chest pain  -EKG showed AF RVR, LBBB->new since 2019  -Echo showed EF 15%  -Not a candidate for invasive cardiac procedures due to age  -Cont metoprolol and statin     3. Acute CHF, systolic  -  -CXR showed cardiomegaly with basilar edema and moderate sized bilateral effusions  -CT chest showed large bilateral pleural effusions with total collapse of both lower lobes  -I reviewed the Echocardiogram from 2019 as above  -Strict I & Os  -Daily weights  -2gm na diet  -Given Lasix IV x1 in ER  -Now on Lasix gtt->cont another 24-48 hours  -Planning for thoracentesis today     4. Acute kidney injury  -Most  likely cardiorenal  -Hold losartan  -baseline Cr 0.85  -Monitor     5. Hypertension  -stable  -2gm na diet  -cont meds  -monitor     6. Hyperlipidemia  -Cont statin     7. Hypothyroidism  -TSH 0.03  -Cont synthroid at 75 mcg daily       JEN Barber-CNP            [1] amiodarone, 200 mg, oral, Daily  [Held by provider] amLODIPine, 5 mg, oral, Daily  [Held by provider] apixaban, 2.5 mg, oral, BID  [Held by provider] levothyroxine, 100 mcg, oral, Daily  [Held by provider] losartan, 100 mg, oral, Daily  metoprolol succinate XL, 25 mg, oral, Daily  polyethylene glycol, 17 g, oral, Daily  pravastatin, 40 mg, oral, Daily  sennosides-docusate sodium, 2 tablet, oral, BID     [2] furosemide, 5 mg/hr, Last Rate: 5 mg/hr (08/11/25 4409)  heparin, 0-4,500 Units/hr, Last Rate: Stopped (08/11/25 9397)     [3] PRN medications: acetaminophen, famotidine, heparin, melatonin, ondansetron, oxygen, oxygen

## 2025-08-13 NOTE — PROGRESS NOTES
Palliative Care Social Work Note     Update from bedside nurse Angela.  Pt had a thora today, breathing has improved, weaning oxygen.    Met with pt, son Monico, and grandson.  Visiting from [place].  Pt states she is feeling better, breathing better.  Pt enjoys having family here.  Pt plans to dc home.

## 2025-08-13 NOTE — PROGRESS NOTES
Occupational Therapy    Occupational Therapy Treatment    Name: Susan Singh  MRN: [id number]  Department: [address]  Room: [facility]-B  Date: 08/13/25  Time Calculation  Start Time: 1305  Stop Time: 1333  Time Calculation (min): 28 min    Assessment:  OT Assessment: Pt continues to present with decreased ADL performance and impaired mobility. Will benefit from skilled OT services at moderate intensity to increase functional outcomes and resume PLOF  Prognosis: Good  Barriers to Discharge Home: Caregiver assistance, Physical needs  Caregiver Assistance: Caregiver assistance needed per identified barriers - however, level of patient's required assistance exceeds assistance available at home  Physical Needs: 24hr mobility assistance needed, 24hr ADL assistance needed  Evaluation/Treatment Tolerance: Patient tolerated treatment well  Medical Staff Made Aware: Yes  End of Session Communication: Bedside nurse  End of Session Patient Position: Bed, 3 rail up, Alarm on    Plan:  Treatment Interventions: ADL retraining, Functional transfer training, UE strengthening/ROM, Endurance training, Patient/family training, Equipment evaluation/education, Compensatory technique education  OT Frequency for Current Admission: 3 times per week during this acute inpatient hospitalization (during acute hospitalization)  OT Discharge Recommendations: Moderate intensity level of continued care (Based on current functional status and rehab potential, patient is anticipated to tolerate and benefit from 5 or more days per week of skilled rehabilitative therapy after discharge from this acute inpatient hospitalization.)  Equipment Recommended upon Discharge: Wheeled walker  OT Recommended Transfer Status: Stand by assist  OT - OK to Discharge: Yes (In accord with OT POC)    Subjective     OT Visit Info:  OT Received On: 08/13/25  General:  General  Reason for Referral: Acute systolic  heart failure, referred to OT for impaired ADL and related  mobility.  Referred By: Toña Vergara  Past Medical History Relevant to Rehab: atrial fibrillation on Eliquis, hypertension, hyperlipidemia, hypothyroidism  Family/Caregiver Present: Yes (niece present over course of session)  Caregiver Feedback: Niece reports providing care at home and feeling that SNF will not be needed.  Prior to Session Communication: Bedside nurse (Pt pending thorocentesis, Waldrop catheter just placed. Patient appropriate for treatment with no new limitations.)  Patient Position Received: Bed, 3 rail up, Alarm on  Preferred Learning Style: verbal  General Comment: Pt pleasant and cooperative, easily engaged in treatment.  Precautions:  Medical Precautions: Fall precautions, Oxygen therapy device and L/min (10 liters O2 via nasal cannula, telemetry, IV, Waldrop catheter.)    Pain Assessment:  Pain Assessment  Pain Assessment: 0-10  0-10 (Numeric) Pain Score: 0 - No pain    Objective   Cognition:  Overall Cognitive Status: Within Functional Limits  Orientation Level: Oriented X4  Activities of Daily Living: LE Dressing  LE Dressing: Yes  Sock Level of Assistance: Setup, Minimum assistance  LE Dressing Where Assessed: Recliner  LE Dressing Comments: Pt required increased time on task to doff and don non-skid socks using combination bending and figure four positions while seated in recliner. Fatigue onset was main limitation ion course of activity. Minor desats noted requiring patient cues for breaks and slowed activity pacing.     Bed Mobility/Transfers: Bed Mobility  Bed Mobility: Yes  Bed Mobility 1  Bed Mobility 1: Supine to sitting  Level of Assistance 1: Close supervision  Bed Mobility Comments 1: Assisted with peripheral lines and tubes.    Transfers  Transfer: Yes  Transfer 1  Transfer From 1: Bed to  Transfer to 1: Stand  Technique 1: Sit to stand, Stand to sit  Transfer Device 1: Walker (front wheel walker)  Transfer Level of Assistance 1: Contact guard, Minimal verbal cues  Trials/Comments 1:  Verbal and tactile cues for device use provided.  Transfers 2  Transfer From 2: Stand to  Transfer to 2: Chair with arms  Technique 2: Stand pivot  Transfer Device 2: Walker (front wheel walker)  Transfer Level of Assistance 2: Contact guard, Minimal verbal cues  Trials/Comments 2: Assist needed for device management in backing and turning to sit.    Functional Mobility:  Functional Mobility  Functional Mobility Performed: Yes  Functional Mobility 1  Surface 1: Level tile  Device 1: Rolling walker  Assistance 1: Contact guard, Minimal verbal cues  Comments 1: Pt with necessary verbal cues for device management bed>bedside chair including backing and turning.    Outcome Measures:  [facility] Daily Activity  Putting on and taking off regular lower body clothing: A lot  Bathing (including washing, rinsing, drying): A lot  Putting on and taking off regular upper body clothing: A little  Toileting, which includes using toilet, bedpan or urinal: A lot  Taking care of personal grooming such as brushing teeth: A little  Eating Meals: None  Daily Activity - Total Score: 16    Education Documentation  Body Mechanics, taught by ELIANA Moran at 8/13/2025  2:34 PM.  Learner: Patient  Readiness: Acceptance  Method: Explanation, Demonstration  Response: Verbalizes Understanding, Demonstrated Understanding, Needs Reinforcement  Comment: Pt educated on falls precautions and device safety, effective body mechanics, use of adaptive techniques and tools for self-care, and non-medication pain management and energy conservation in course of session. Continued reinforcement is indicated.    ADL Training, taught by ELIANA Moran at 8/13/2025  2:34 PM.  Learner: Patient  Readiness: Acceptance  Method: Explanation, Demonstration  Response: Verbalizes Understanding, Demonstrated Understanding, Needs Reinforcement  Comment: Pt educated on falls precautions and device safety, effective body mechanics, use of adaptive techniques and tools  for self-care, and non-medication pain management and energy conservation in course of session. Continued reinforcement is indicated.    Precautions, taught by ELIANA Moran at 8/13/2025  2:34 PM.  Learner: Patient  Readiness: Acceptance  Method: Explanation, Demonstration  Response: Verbalizes Understanding, Demonstrated Understanding, Needs Reinforcement  Comment: Pt educated on falls precautions and device safety, effective body mechanics, use of adaptive techniques and tools for self-care, and non-medication pain management and energy conservation in course of session. Continued reinforcement is indicated.    Goals:  Encounter Problems       Encounter Problems (Active)       ADLs       Patient with complete lower body dressing with stand by assist level of assistance donning and doffing all LE clothes  with PRN adaptive equipment while edge of bed  and standing (Progressing)       Start:  08/12/25    Expected End:  08/26/25            Patient will complete daily grooming tasks brushing teeth and washing face/hair with stand by assist level of assistance and PRN adaptive equipment while edge of bed  and standing.       Start:  08/12/25    Expected End:  08/26/25            Patient will complete toileting including hygiene clothing management/hygiene with stand by assist level of assistance.       Start:  08/12/25    Expected End:  08/26/25               MOBILITY       Pt will demo functional mobility necessary to complete ADL routine with LRD and SBA (Progressing)       Start:  08/12/25    Expected End:  08/26/25               TRANSFERS       Patient will complete sit to stand transfer with stand by assist level of assistance and least restrictive device in order to improve safety and prepare for out of bed mobility. (Progressing)       Start:  08/12/25    Expected End:  08/26/25

## 2025-08-13 NOTE — PROGRESS NOTES
"Susan Singh is a 89 y.o. female on day 5 of admission presenting with Acute systolic heart failure.    Subjective   States breathing feels better. She is off Airvo.       Objective   GEN: sitting up in chair. Breathing comfortably  ENT: wearing high flow nasal cannula  CV: RRR, no m/g/r  LUNGS: good effort,diminished at bases bilaterally     Physical Exam    Last Recorded Vitals  Blood pressure 147/67, pulse 70, temperature 36.5 °C (97.7 °F), temperature source Temporal, resp. rate 16, height 1.626 m (5' 4\"), weight 54 kg (119 lb), SpO2 100%.  Intake/Output last 3 Shifts:  I/O last 3 completed shifts:  In: 957.5 (17.7 mL/kg) [P.O.:940; I.V.:17.5 (0.3 mL/kg)]  Out: 2325 (43.1 mL/kg) [Urine:2325 (1.2 mL/kg/hr)]  Weight: 54 kg     Relevant Results  Scheduled medications  amiodarone, 200 mg, oral, Daily  [Held by provider] amLODIPine, 5 mg, oral, Daily  [Held by provider] apixaban, 2.5 mg, oral, BID  heparin, 60 Units/kg, intravenous, Once  [Held by provider] levothyroxine, 100 mcg, oral, Daily  [Held by provider] losartan, 100 mg, oral, Daily  metoprolol succinate XL, 25 mg, oral, Daily  polyethylene glycol, 17 g, oral, Daily  pravastatin, 40 mg, oral, Daily  sennosides-docusate sodium, 2 tablet, oral, BID      Continuous medications  Continuous Medications[1]  PRN medications  PRN Medications[2]    Results for orders placed or performed during the hospital encounter of 08/08/25 (from the past 24 hours)   CBC   Result Value Ref Range    WBC 5.6 4.4 - 11.3 x10*3/uL    nRBC 0.0 0.0 - 0.0 /100 WBCs    RBC 4.43 4.00 - 5.20 x10*6/uL    Hemoglobin 12.3 12.0 - 16.0 g/dL    Hematocrit 39.3 36.0 - 46.0 %    MCV 89 80 - 100 fL    MCH 27.8 26.0 - 34.0 pg    MCHC 31.3 (L) 32.0 - 36.0 g/dL    RDW 14.6 (H) 11.5 - 14.5 %    Platelets 222 150 - 450 x10*3/uL   Magnesium   Result Value Ref Range    Magnesium 1.91 1.60 - 2.40 mg/dL   Renal function panel   Result Value Ref Range    Glucose 97 74 - 99 mg/dL    Sodium 139 136 - 145 mmol/L "    Potassium 3.6 3.5 - 5.3 mmol/L    Chloride 99 98 - 107 mmol/L    Bicarbonate 32 21 - 32 mmol/L    Anion Gap 12 10 - 20 mmol/L    Urea Nitrogen 28 (H) 6 - 23 mg/dL    Creatinine 1.35 (H) 0.50 - 1.05 mg/dL    eGFR 36 (L) >60 mL/min/1.73m*2    Calcium 8.1 (L) 8.6 - 10.3 mg/dL    Phosphorus 4.2 2.5 - 4.9 mg/dL    Albumin 2.9 (L) 3.4 - 5.0 g/dL   Heparin Assay   Result Value Ref Range    Heparin Unfractionated 0.3 See Comment Below for Therapeutic Ranges IU/mL   Body Fluid Cell Count   Result Value Ref Range    Color, Fluid Yellow Colorless, Straw, Yellow    Clarity, Fluid Clear Clear    WBC, Fluid 297 See Comment /uL    RBC, Fluid 15 see comment /uL   Body Fluid Differential   Result Value Ref Range    Neutrophils %, Manual, Fluid 21 see comment %    Lymphocytes %, Manual, Fluid 11 see comment %    Mono/Macrophages %, Manual, Fluid 68 see comment %    Eosinophils %, Manual, Fluid      Total Cells Counted, Fluid 100    aPTT - baseline   Result Value Ref Range    aPTT 29 26 - 36 seconds   Heparin Assay, UFH   Result Value Ref Range    Heparin Unfractionated 0.3 See Comment Below for Therapeutic Ranges IU/mL   Protime-INR   Result Value Ref Range    Protime 12.8 (H) 9.8 - 12.4 seconds    INR 1.2 (H) 0.9 - 1.1                        Assessment & Plan  Acute systolic heart failure    Acute hypoxic respiratory failure    Pleural effusion    Summary:  89 y.o. female with severe HFrEF admitted on 8/8/2025  6:51 AM for dyspnea, Afib with RVR. She has had increasing O2 demands and is now on Airvo. Performed bedside ultrasound 8/11/2025 which demonstrated bilateral moderate pleural effusions. Thoracentesis on right of 900mL 8/13/2025        Recommendations:  -continue diuresis, and medical management  -may require thoracentesis on left tomorrow  -Continue supplemental oxygen to goal SpO2 of 90-92%. Currently on 10L/min .   -ok to restart heparin IV      Vytas Reggie, DO  Pulmonary & Critical Care  8/13/2025 10:07 PM          [1] furosemide, 5 mg/hr, Last Rate: 5 mg/hr (08/11/25 2153)  heparin, 0-4,000 Units/hr, Last Rate: 600 Units/hr (08/13/25 1829)  [2] PRN medications: acetaminophen, famotidine, heparin, melatonin, ondansetron, oxygen, oxygen

## 2025-08-14 LAB
ALBUMIN FLD-MCNC: 1.6 G/DL
ALBUMIN SERPL BCP-MCNC: 3 G/DL (ref 3.4–5)
ANION GAP SERPL CALC-SCNC: 9 MMOL/L (ref 10–20)
BUN SERPL-MCNC: 30 MG/DL (ref 6–23)
CALCIUM SERPL-MCNC: 8.1 MG/DL (ref 8.6–10.3)
CHLORIDE SERPL-SCNC: 96 MMOL/L (ref 98–107)
CHOLEST FLD-MCNC: <25 MG/DL
CO2 SERPL-SCNC: 35 MMOL/L (ref 21–32)
CREAT SERPL-MCNC: 1.57 MG/DL (ref 0.5–1.05)
EGFRCR SERPLBLD CKD-EPI 2021: 30 ML/MIN/1.73M*2
ERYTHROCYTE [DISTWIDTH] IN BLOOD BY AUTOMATED COUNT: 14.5 % (ref 11.5–14.5)
GLUCOSE FLD-MCNC: 148 MG/DL
GLUCOSE SERPL-MCNC: 112 MG/DL (ref 74–99)
HCT VFR BLD AUTO: 39.3 % (ref 36–46)
HGB BLD-MCNC: 12.4 G/DL (ref 12–16)
LDH FLD L TO P-CCNC: 87 U/L
MCH RBC QN AUTO: 28.1 PG (ref 26–34)
MCHC RBC AUTO-ENTMCNC: 31.6 G/DL (ref 32–36)
MCV RBC AUTO: 89 FL (ref 80–100)
NRBC BLD-RTO: 0 /100 WBCS (ref 0–0)
PH FLD: 8.27 [PH]
PHOSPHATE SERPL-MCNC: 4.1 MG/DL (ref 2.5–4.9)
PLATELET # BLD AUTO: 220 X10*3/UL (ref 150–450)
POTASSIUM SERPL-SCNC: 3.3 MMOL/L (ref 3.5–5.3)
PROT FLD-MCNC: 2.3 G/DL
RBC # BLD AUTO: 4.42 X10*6/UL (ref 4–5.2)
SODIUM SERPL-SCNC: 137 MMOL/L (ref 136–145)
UFH PPP CHRO-ACNC: 0.4 IU/ML (ref ?–1.1)
WBC # BLD AUTO: 5.8 X10*3/UL (ref 4.4–11.3)

## 2025-08-14 PROCEDURE — 2500000001 HC RX 250 WO HCPCS SELF ADMINISTERED DRUGS (ALT 637 FOR MEDICARE OP): Performed by: INTERNAL MEDICINE

## 2025-08-14 PROCEDURE — 2500000002 HC RX 250 W HCPCS SELF ADMINISTERED DRUGS (ALT 637 FOR MEDICARE OP, ALT 636 FOR OP/ED): Performed by: INTERNAL MEDICINE

## 2025-08-14 PROCEDURE — 85027 COMPLETE CBC AUTOMATED: CPT | Performed by: FAMILY MEDICINE

## 2025-08-14 PROCEDURE — 94760 N-INVAS EAR/PLS OXIMETRY 1: CPT

## 2025-08-14 PROCEDURE — 99233 SBSQ HOSP IP/OBS HIGH 50: CPT | Performed by: FAMILY MEDICINE

## 2025-08-14 PROCEDURE — 36415 COLL VENOUS BLD VENIPUNCTURE: CPT | Performed by: FAMILY MEDICINE

## 2025-08-14 PROCEDURE — 97116 GAIT TRAINING THERAPY: CPT | Mod: GP,CQ

## 2025-08-14 PROCEDURE — 2060000001 HC INTERMEDIATE ICU ROOM DAILY

## 2025-08-14 PROCEDURE — 2500000004 HC RX 250 GENERAL PHARMACY W/ HCPCS (ALT 636 FOR OP/ED): Performed by: STUDENT IN AN ORGANIZED HEALTH CARE EDUCATION/TRAINING PROGRAM

## 2025-08-14 PROCEDURE — 85520 HEPARIN ASSAY: CPT | Performed by: FAMILY MEDICINE

## 2025-08-14 PROCEDURE — 2500000001 HC RX 250 WO HCPCS SELF ADMINISTERED DRUGS (ALT 637 FOR MEDICARE OP): Performed by: STUDENT IN AN ORGANIZED HEALTH CARE EDUCATION/TRAINING PROGRAM

## 2025-08-14 PROCEDURE — 2500000005 HC RX 250 GENERAL PHARMACY W/O HCPCS: Performed by: FAMILY MEDICINE

## 2025-08-14 PROCEDURE — 97110 THERAPEUTIC EXERCISES: CPT | Mod: GP,CQ

## 2025-08-14 PROCEDURE — 80069 RENAL FUNCTION PANEL: CPT

## 2025-08-14 PROCEDURE — 99232 SBSQ HOSP IP/OBS MODERATE 35: CPT | Performed by: INTERNAL MEDICINE

## 2025-08-14 RX ADMIN — Medication 1 DOSE: at 08:46

## 2025-08-14 RX ADMIN — ACETAMINOPHEN 650 MG: 325 TABLET ORAL at 00:19

## 2025-08-14 RX ADMIN — Medication: at 04:10

## 2025-08-14 RX ADMIN — AMIODARONE HYDROCHLORIDE 200 MG: 200 TABLET ORAL at 09:28

## 2025-08-14 RX ADMIN — Medication 5 MG: at 00:19

## 2025-08-14 RX ADMIN — POLYETHYLENE GLYCOL 3350 17 G: 17 POWDER, FOR SOLUTION ORAL at 09:34

## 2025-08-14 RX ADMIN — PRAVASTATIN SODIUM 40 MG: 40 TABLET ORAL at 09:28

## 2025-08-14 RX ADMIN — ACETAMINOPHEN 650 MG: 325 TABLET ORAL at 09:46

## 2025-08-14 RX ADMIN — SENNOSIDES AND DOCUSATE SODIUM 2 TABLET: 50; 8.6 TABLET ORAL at 09:32

## 2025-08-14 ASSESSMENT — COGNITIVE AND FUNCTIONAL STATUS - GENERAL
DAILY ACTIVITIY SCORE: 20
STANDING UP FROM CHAIR USING ARMS: A LITTLE
MOBILITY SCORE: 18
STANDING UP FROM CHAIR USING ARMS: A LITTLE
TOILETING: A LITTLE
MOVING FROM LYING ON BACK TO SITTING ON SIDE OF FLAT BED WITH BEDRAILS: A LITTLE
DRESSING REGULAR UPPER BODY CLOTHING: A LITTLE
WALKING IN HOSPITAL ROOM: A LOT
DRESSING REGULAR LOWER BODY CLOTHING: A LITTLE
WALKING IN HOSPITAL ROOM: A LITTLE
TOILETING: A LITTLE
HELP NEEDED FOR BATHING: A LITTLE
MOVING TO AND FROM BED TO CHAIR: A LITTLE
CLIMB 3 TO 5 STEPS WITH RAILING: A LOT
TURNING FROM BACK TO SIDE WHILE IN FLAT BAD: A LITTLE
HELP NEEDED FOR BATHING: A LITTLE
DRESSING REGULAR UPPER BODY CLOTHING: A LITTLE
MOVING TO AND FROM BED TO CHAIR: A LITTLE
MOBILITY SCORE: 16
DAILY ACTIVITIY SCORE: 20
TURNING FROM BACK TO SIDE WHILE IN FLAT BAD: A LITTLE
MOBILITY SCORE: 16
WALKING IN HOSPITAL ROOM: A LOT
MOVING FROM LYING ON BACK TO SITTING ON SIDE OF FLAT BED WITH BEDRAILS: A LITTLE
CLIMB 3 TO 5 STEPS WITH RAILING: A LOT
STANDING UP FROM CHAIR USING ARMS: A LITTLE
TURNING FROM BACK TO SIDE WHILE IN FLAT BAD: A LITTLE
DRESSING REGULAR LOWER BODY CLOTHING: A LITTLE
MOVING TO AND FROM BED TO CHAIR: A LITTLE
MOVING FROM LYING ON BACK TO SITTING ON SIDE OF FLAT BED WITH BEDRAILS: A LITTLE
CLIMB 3 TO 5 STEPS WITH RAILING: A LITTLE

## 2025-08-14 ASSESSMENT — PAIN - FUNCTIONAL ASSESSMENT
PAIN_FUNCTIONAL_ASSESSMENT: 0-10

## 2025-08-14 ASSESSMENT — PAIN SCALES - GENERAL
PAINLEVEL_OUTOF10: 0 - NO PAIN
PAINLEVEL_OUTOF10: 0 - NO PAIN
PAINLEVEL_OUTOF10: 10 - WORST POSSIBLE PAIN
PAINLEVEL_OUTOF10: 0 - NO PAIN

## 2025-08-14 ASSESSMENT — PAIN DESCRIPTION - LOCATION: LOCATION: LEG

## 2025-08-14 NOTE — PROGRESS NOTES
08/14/25 1248   Discharge Planning   Expected Discharge Disposition Home H  (patient now on 3L( none baseline), per PT today could go SNF or HHC with 24 hr supervision. Discussed with patient who continues to refuse SNF. Plan remains home with new [facility] on d/c.)

## 2025-08-14 NOTE — CARE PLAN
Problem: Heart Failure  Goal: Reduction in peripheral edema within 24 hours  Outcome: Progressing  Goal: Report improvement of dyspnea/breathlessness this shift  Outcome: Progressing     Problem: Pain - Adult  Goal: Verbalizes/displays adequate comfort level or baseline comfort level  Outcome: Progressing     Problem: Safety - Adult  Goal: Free from fall injury  Outcome: Progressing     Problem: Fall/Injury  Goal: Be free from injury by end of the shift  Outcome: Progressing     Problem: Respiratory  Goal: No signs of respiratory distress (eg. Use of accessory muscles. Peds grunting)  Outcome: Progressing  Goal: Patent airway maintained this shift  Outcome: Progressing   The patient's goals for the shift include      The clinical goals for the shift include patient will tolerate weaning of oxygen this shift    Over the shift, the patient did not make progress toward the following goals. Barriers to progression include patient is forgetful at times. Recommendations to address these barriers include reiteration of care and family involvement in care.

## 2025-08-14 NOTE — PROGRESS NOTES
Susan Singh is a 89 y.o. female on day 6 of admission presenting with Acute systolic heart failure.      Subjective   She is sitting up in the bed, no complaints. Currently on O2 at 3L per NC.     Telemetry reviewed, SR      Review of systems:  Constitutional: negative for fever, chills, or malaise  Neuro: negative for dizziness, headache, numbness, tingling  ENT: Negative for nasal congestion or sore throat  Resp: positive for shortness of breath, cough, or wheezing  CV: negative for chest pain, palpitations  GI: negative for abd pain, nausea, vomiting or diarrhea  : negative for dysuria, frequency, or urgency  Skin: negative for lesions, wounds, or rash  Musculoskeletal: Negative for weakness, myalgia, or arthralgia  Endocrine: Negative for polyuria or polydipsia       Objective   Constitutional: Well developed, awake/alert/oriented x3, no distress, alert and cooperative  Eyes: PERRL, EOMI, clear sclera  ENMT: mucous membranes moist, no apparent injury, no lesions seen  Head/Neck: Neck supple, no apparent injury, thyroid without mass or tenderness, No JVD, trachea midline, no bruits  Respiratory/Thorax: Patent airways, diminished left lower lobe, improved aeration right sided  Cardiovascular: Regular, rate and rhythm, no murmurs, 2+ equal pulses of the extremities, normal S 1and S 2  Gastrointestinal: Nondistended, soft, non-tender, no rebound tenderness or guarding, no masses palpable, no organomegaly, +BS, no bruits  Musculoskeletal: ROM intact, no joint swelling, normal strength  Extremities: normal extremities, no cyanosis edema, contusions or wounds, no clubbing  Neurological: alert and oriented x3, intact senses, motor, response and reflexes, normal strength  Lymphatic: No significant lymphadenopathy  Psychological: Appropriate mood and behavior  Skin: Warm and dry, no lesions, no rashes      Last Recorded Vitals  /62 (BP Location: Left arm, Patient Position: Lying)   Pulse 58   Temp 36.1 °C (97  "°F) (Temporal)   Resp 17   Ht 1.626 m (5' 4\")   Wt 49.8 kg (109 lb 12.8 oz)   SpO2 99%   BMI 18.85 kg/m²     Intake/Output last 3 Shifts:  I/O last 3 completed shifts:  In: 579 (11.6 mL/kg) [P.O.:480; I.V.:99 (2 mL/kg)]  Out: 1975 (39.7 mL/kg) [Urine:1975 (1.1 mL/kg/hr)]  Weight: 49.8 kg   I/O this shift:  In: 360 [P.O.:360]  Out: -     Relevant Results  Scheduled medications  Scheduled Medications[1]  Continuous medications  Continuous Medications[2]  PRN medications  PRN Medications[3]    Results for orders placed or performed during the hospital encounter of 08/08/25 (from the past 24 hours)   pH, Body Fluid   Result Value Ref Range    pH, Fluid 8.27 See Below   Sterile Fluid Culture/Smear    Specimen: Pleural; Fluid   Result Value Ref Range    Gram Stain (3+) Moderate Polymorphonuclear leukocytes     Gram Stain No organisms seen    Body Fluid Cell Count   Result Value Ref Range    Color, Fluid Yellow Colorless, Straw, Yellow    Clarity, Fluid Clear Clear    WBC, Fluid 297 See Comment /uL    RBC, Fluid 15 see comment /uL   Body Fluid Differential   Result Value Ref Range    Neutrophils %, Manual, Fluid 21 see comment %    Lymphocytes %, Manual, Fluid 11 see comment %    Mono/Macrophages %, Manual, Fluid 68 see comment %    Eosinophils %, Manual, Fluid      Total Cells Counted, Fluid 100    Protein, Total, Body Fluid   Result Value Ref Range    Protein, Total Fluid 2.3 Not established g/dL   Lactate Dehydrogenase, Body Fluid   Result Value Ref Range    LD, Fluid 87 Not established. U/L   Glucose, Body Fluid   Result Value Ref Range    Glucose, Fluid 148 Not established mg/dL   Cholesterol, Body Fluid   Result Value Ref Range    Cholesterol, Fluid <25 Not established mg/dL   Albumin, Body Fluid   Result Value Ref Range    Albumin, Fluid 1.6 Not established g/dL   aPTT - baseline   Result Value Ref Range    aPTT 29 26 - 36 seconds   Heparin Assay, UFH   Result Value Ref Range    Heparin Unfractionated 0.3 See " Comment Below for Therapeutic Ranges IU/mL   Protime-INR   Result Value Ref Range    Protime 12.8 (H) 9.8 - 12.4 seconds    INR 1.2 (H) 0.9 - 1.1   Heparin Assay   Result Value Ref Range    Heparin Unfractionated 0.4 See Comment Below for Therapeutic Ranges IU/mL   Renal function panel   Result Value Ref Range    Glucose 112 (H) 74 - 99 mg/dL    Sodium 137 136 - 145 mmol/L    Potassium 3.3 (L) 3.5 - 5.3 mmol/L    Chloride 96 (L) 98 - 107 mmol/L    Bicarbonate 35 (H) 21 - 32 mmol/L    Anion Gap 9 (L) 10 - 20 mmol/L    Urea Nitrogen 30 (H) 6 - 23 mg/dL    Creatinine 1.57 (H) 0.50 - 1.05 mg/dL    eGFR 30 (L) >60 mL/min/1.73m*2    Calcium 8.1 (L) 8.6 - 10.3 mg/dL    Phosphorus 4.1 2.5 - 4.9 mg/dL    Albumin 3.0 (L) 3.4 - 5.0 g/dL   CBC   Result Value Ref Range    WBC 5.8 4.4 - 11.3 x10*3/uL    nRBC 0.0 0.0 - 0.0 /100 WBCs    RBC 4.42 4.00 - 5.20 x10*6/uL    Hemoglobin 12.4 12.0 - 16.0 g/dL    Hematocrit 39.3 36.0 - 46.0 %    MCV 89 80 - 100 fL    MCH 28.1 26.0 - 34.0 pg    MCHC 31.6 (L) 32.0 - 36.0 g/dL    RDW 14.5 11.5 - 14.5 %    Platelets 220 150 - 450 x10*3/uL   Heparin Assay   Result Value Ref Range    Heparin Unfractionated 0.4 See Comment Below for Therapeutic Ranges IU/mL       XR chest 1 view   Final Result   1. Interval decrease in right-sided pleural effusion compared to   immediate prior radiographs status post thoracentesis. No sizable   pneumothorax within limits of portable technique.   2. Redemonstration of moderate left-sided pleural effusion, similar   to slightly decreased in size compared to prior radiograph dated   08/10/2025.                  MACRO:   None        Signed by: Anika Sharp 8/13/2025 4:19 PM   Dictation workstation:   [id number]      CT chest wo IV contrast   Final Result   1. Large bilateral pleural effusions are present along with near   total collapse of both lower lobes.   2. Remote granulomatous disease of the chest and abdomen.   3. Findings compatible with multiple left  renal cysts.   4. Patchy ground-glass infiltrate left apex        MACRO:   Incidental Finding:   A ground glass pulmonary nodule measuring 6 mm   or larger.  (**-YCF-**)        Instructions:  Consider follow up non contrast chest CT at 6-12   months to confirm persistence, then consider CT chest every 2 years   until 5 years. (Sarkis Batres et al., Guidelines for management of   incidental pulmonary nodules detected on CT images: From the   Fleischner Society 2017, Radiology. 2017 Jul;284 (1):228-243.)   [email or web address]        Signed by: Kale Thorpe 8/11/2025 8:37 AM   Dictation workstation:   [id number]      Vascular US lower extremity venous duplex bilateral   Final Result   Suboptimal visualization of the calf veins. No sonographic evidence   DVT in the visualized vessels of bilateral lower extremities.        MACRO:   None        Signed by: Jose Jimenez 8/10/2025 7:06 PM   Dictation workstation:   [id number]      NM Lung perfusion with spect/ct   Final Result   The perfusion of both lungs is within normal limits with no evidence   for acute pulmonary embolism.        Moderate bilateral pleural effuion, more severe on right than left             The interpretation above is based on modified PIOPED II and PISAPED   criteria.        This study was analyzed and interpreted at [place], [place].             Signed by: Saul Kingston 8/10/2025 4:35 PM   Dictation workstation:   [id number]      XR chest 1 view   Final Result        Worsening basilar edema with bilateral pleural effusions and airspace   opacities increasing in size.        No evidence of pneumothorax.        Signed by: Leobardo Leal 8/10/2025 6:19 PM   Dictation workstation:   [id number]      Transthoracic Echo Complete   Final Result      XR chest 1 view   Final Result   Cardiomegaly with basilar edema and moderate-sized bilateral   effusions.        Signed by: Leobardo Leal 8/8/2025 7:35 AM   Dictation workstation:    [id number]      CT head wo IV contrast   Final Result   NO ACUTE INTRACRANIAL PROCESS. SKULL INTACT        MACRO:   None        Signed by: Ranjith Prado 8/8/2025 7:36 AM   Dictation workstation:   [id number]          Transthoracic Echo Complete  Result Date: 8/9/2025   [facility], [address] [address]               Tel [phone] and Fax [phone] TRANSTHORACIC ECHOCARDIOGRAM REPORT  Patient Name:       CHICHI BOYCE        Reading Physician:    [id number] Juan Kinney MD Study Date:         8/8/2025            Ordering Provider:    [id number] HEIDY LUU MRN/PID:            [id number]            Fellow: Accession#:         [id number]        Nurse:                Jackie Alexander RN Date of Birth/Age:  1/28/1928 / 89      Sonographer:          Tomasa luu RDCS Gender assigned at  F                   Additional Staff: Birth: Height:             162.56 cm           Admit Date:           8/8/2025 Weight:             104.33 kg           Admission Status:     Inpatient -                                                               Routine BSA / BMI:          2.08 m2 / 39.48     Encounter#:           [id number]                     kg/m2 Blood Pressure:     139/83 mmHg         Department Location:  [facility] Non                                                               Invasive Study Type:    TRANSTHORACIC ECHO (TTE) COMPLETE Diagnosis/ICD: Acute systolic (congestive) heart failure (CHF)-I50.21 Indication:    Congestive Heart Failure CPT Code:      Echo Complete w Full Doppler-[id number] Patient History: Pertinent History: HTN, A-Fib and Dyspnea. Study Detail: The following Echo studies were performed: 2D, M-Mode, Doppler and               color flow. Definity used as a contrast agent for  endocardial               border definition. Total contrast used for this procedure was 2.0               mL via IV push. The patient was awake.  PHYSICIAN INTERPRETATION: Left Ventricle: The left ventricular systolic function is severely decreased with a visually estimated ejection fraction of 15%. There is global hypokinesis of the left ventricle with minor regional variations. The left ventricular cavity size is mildly dilated. There is normal septal and normal posterior left ventricular wall thickness. There is left ventricular concentric remodeling. Spectral Doppler shows a Grade III (restrictive pattern) of left ventricular diastolic filling with an elevated left atrial pressure. Left Atrium: The left atrium is mildly dilated. Right Ventricle: The right ventricle is mildly enlarged. There is moderately reduced right ventricular systolic function. Right Atrium: The right atrium is mildly dilated. Aortic Valve: The aortic valve is trileaflet. The aortic valve area by VTI is 1.89 cmÂ² with a peak velocity of 0.88 m/s. The peak and mean gradients are 3 mmHg and 2 mmHg, respectively with a dimensionless index of 0.70. There is moderate aortic valve thickening. There is mild aortic valve regurgitation. Mitral Valve: The mitral valve is normal in structure. The doppler estimated peak and mean diastolic pressure gradients are 1.8 mmHg and 1 mmHg, respectively. The mean gradient of the mitral valve is 1 mmHg. There is mild mitral valve regurgitation. The E Vmax is 0.65 m/s. Tricuspid Valve: The tricuspid valve is structurally normal. There is moderate tricuspid regurgitation. The Doppler estimated right ventricular systolic pressure (RVSP) is mildly elevated at 38 mmHg. Pulmonic Valve: The pulmonic valve is structurally normal. There is mild pulmonic valve regurgitation. Pericardium: Trivial to small pericardial effusion. Aorta: The aortic root is normal. Pulmonary Artery: The tricuspid regurgitant velocity is 2.94  m/s, and with an estimated right atrial pressure of 3, the estimated pulmonary artery pressure is moderately elevated with the RVSP at 38 mmHg. Systemic Veins: The inferior vena cava appears normal in size, with IVC inspiratory collapse greater than 50%.  CONCLUSIONS:  1. The left ventricular systolic function is severely decreased with a visually estimated ejection fraction of 15%.  2. There is global hypokinesis of the left ventricle with minor regional variations.  3. Spectral Doppler shows a Grade III (restrictive pattern) of left ventricular diastolic filling with an elevated left atrial pressure.  4. Left ventricular cavity size is mildly dilated.  5. There is moderately reduced right ventricular systolic function.  6. Mildly enlarged right ventricle.  7. The left atrium is mildly dilated.  8. Moderate tricuspid regurgitation visualized.  9. The Doppler estimated RVSP is around 45-50 mm Hg. 10. Mild aortic valve regurgitation. 11. Moderately elevated pulmonary artery pressure. QUANTITATIVE DATA SUMMARY:  2D MEASUREMENTS:          Normal Ranges: IVSd:            0.87 cm  (0.6-1.1cm) LVPWd:           0.87 cm  (0.6-1.1cm) LVIDd:           3.35 cm  (3.9-5.9cm) LVIDs:           2.77 cm LV Mass Index:   38 g/m2 LVEDV Index:     27 ml/m2 LV % FS          17.3 %  LEFT ATRIUM:                  Normal Ranges: LA Vol A4C:        52.2 ml    (22+/-6mL/m2) LA Vol A2C:        39.6 ml LA Vol BP:         47.4 ml LA Vol Index A4C:  25.2 ml/m2 LA Vol Index A2C:  19.1 ml/m2 LA Vol Index BP:   22.8 ml/m2 LA Area A4C:       17.7 cm2 LA Area A2C:       14.8 cm2 LA Major Axis A4C: 5.1 cm LA Major Axis A2C: 4.7 cm LA Volume Index:   22.6 ml/m2 LA Vol A4C:        49.0 ml LA Vol A2C:        36.6 ml LA Vol Index BSA:  20.6 ml/m2  RIGHT ATRIUM:          Normal Ranges: RA Area A4C:  17.6 cm2  AORTA MEASUREMENTS:         Normal Ranges: Ao Sinus, d:        2.90 cm (2.1-3.5cm) Asc Ao, d:          3.20 cm (2.1-3.4cm)  LV SYSTOLIC FUNCTION:                       Normal Ranges: EF-A4C View:    32 % (>=55%) EF-A2C View:    31 % EF-Biplane:     30 % EF-Visual:      15 % LV EF Reported: 15 %  LV DIASTOLIC FUNCTION:            Normal Ranges: MV Peak E:             0.65 m/s   (0.7-1.2 m/s) MV Peak A:             0.31 m/s   (0.42-0.7 m/s) E/A Ratio:             2.13       (1.0-2.2) MV e'                  0.030 m/s  (>8.0) MV lateral e'          0.02 m/s MV medial e'           0.04 m/s MV A Dur:              74.22 msec E/e' Ratio:            21.76      (<8.0)  MITRAL VALVE:          Normal Ranges: MV Vmax:      0.68 m/s (<=1.3m/s) MV peak P.8 mmHg (<5mmHg) MV mean P.0 mmHg (<2mmHg) MV VTI:       7.03 cm  (10-13cm) MV DT:        52 msec  (150-240msec)  AORTIC VALVE:                     Normal Ranges: AoV Vmax:                0.88 m/s (<=1.7m/s) AoV Peak PG:             3.1 mmHg (<20mmHg) AoV Mean P.9 mmHg (1.7-11.5mmHg) LVOT Max Earl:            0.60 m/s (<=1.1m/s) AoV VTI:                 13.95 cm (18-25cm) LVOT VTI:                9.81 cm LVOT Diameter:           1.85 cm  (1.8-2.4cm) AoV Area, VTI:           1.89 cm2 (2.5-5.5cm2) AoV Area,Vmax:           1.84 cm2 (2.5-4.5cm2) AoV Dimensionless Index: 0.70  RIGHT VENTRICLE: RV Basal 2.90 cm RV Mid   1.90 cm RV Major 5.6 cm TAPSE:   9.0 mm RV s'    0.06 m/s  TRICUSPID VALVE/RVSP:          Normal Ranges: Peak TR Velocity:     2.94 m/s Est. RA Pressure:     3 RV Syst Pressure:     38       (< 30mmHg) IVC Diam:             2.00 cm  PULMONIC VALVE:          Normal Ranges: PV Max Earl:     0.3 m/s  (0.6-0.9m/s) PV Max P.4 mmHg  AORTA: Asc Ao Diam 3.18 cm  [id number] Juan Kinney MD Electronically signed on 2025 at 4:59:53 PM  ** Final **            Assessment/Plan   Assessment & Plan  Acute systolic heart failure    Acute hypoxic respiratory failure    Pleural effusion      Echocardiogram from 2019: LVEF 55-60%    Echo 25 reviewed  1. The left ventricular systolic function  is severely decreased with a visually estimated ejection fraction of 15%.   2. There is global hypokinesis of the left ventricle with minor regional variations.   3. Spectral Doppler shows a Grade III (restrictive pattern) of left ventricular diastolic filling with an elevated left atrial pressure.   4. Left ventricular cavity size is mildly dilated.   5. There is moderately reduced right ventricular systolic function.   6. Mildly enlarged right ventricle.   7. The left atrium is mildly dilated.   8. Moderate tricuspid regurgitation visualized.   9. The Doppler estimated RVSP is around 45-50 mm Hg.  10. Mild aortic valve regurgitation.  11. Moderately elevated pulmonary artery pressure.           Paroxysmal atrial fibrillation with RVR  -EKG reviewed AF RVR, LBBB  -Given metoprolol 5mg IV x1   -TSH 0.03  -Monitor on tele  - 8/9 started amiodarone gtt for a fib with rvr,   8/10 reduced metoprolol to succinate 25 mg daily  continue to monitor on tele, amio gtt stopped and change to 200 mg daily  8/11 Continue furosemide gtt, amiodarone gtt change to amio po 200 mg daily, currently in SR. Eliquis held for potential thoracentesis, continue heparin gtt  8/11 HR controlled, maintaining SR with occasional bradycardia into the 40's. May benefit from pacemaker implant to allow adequate beta blockade and amiodarone therapy as outpt. Eliquis on hold for thoracentesis, cont heparin gtt     2. Elevated troponins-Non MI elevation  -Denies chest pain  -EKG showed AF RVR, LBBB->new since 2019  -Echo showed EF 15%  -Not a candidate for invasive cardiac procedures due to age  -Cont metoprolol and statin     3. Acute CHF, systolic  -  -CXR showed cardiomegaly with basilar edema and moderate sized bilateral effusions  -CT chest showed large bilateral pleural effusions with total collapse of both lower lobes  -I reviewed the Echocardiogram from 2019 as above  -Strict I & Os  -Daily weights  -2gm na diet  -Given Lasix IV x1 in  ER  -Lasix gtt started     --Creatinine and bicarb increasing, stop lasix gtt  -s/p right thoracentesis 8/13 with 900mL removed  -Planning for possible left thoracentesis today     4. Acute kidney injury  -Creatinine now up to 1.57  -Most likely cardiorenal and now diuresis  -Hold losartan  -Stop lasix gtt  -baseline Cr 0.85  -Monitor     5. Hypertension  -stable  -2gm na diet  -cont meds  -monitor     6. Hyperlipidemia  -Cont statin     7. Hypothyroidism  -TSH 0.03  -Cont synthroid at 75 mcg daily       JEN Barber-CNP              [1] amiodarone, 200 mg, oral, Daily  [Held by provider] amLODIPine, 5 mg, oral, Daily  [Held by provider] apixaban, 2.5 mg, oral, BID  heparin, 60 Units/kg, intravenous, Once  [Held by provider] levothyroxine, 100 mcg, oral, Daily  [Held by provider] losartan, 100 mg, oral, Daily  metoprolol succinate XL, 25 mg, oral, Daily  polyethylene glycol, 17 g, oral, Daily  pravastatin, 40 mg, oral, Daily  sennosides-docusate sodium, 2 tablet, oral, BID     [2] [Held by provider] furosemide, 5 mg/hr, Last Rate: 5 mg/hr (08/11/25 0111)  heparin, 0-4,000 Units/hr, Last Rate: 600 Units/hr (08/14/25 8907)     [3] PRN medications: acetaminophen, famotidine, heparin, melatonin, ondansetron, oxygen, oxygen

## 2025-08-14 NOTE — PROGRESS NOTES
Physical Therapy    Physical Therapy Treatment    Patient Name: Susan Singh  MRN: [id number]  Department: [address]  Room: [address] [place] [address]  Today's Date: 8/14/2025  Time Calculation  Start Time: 1105  Stop Time: 1132  Time Calculation (min): 27 min         Assessment/Plan   PT Assessment  PT Assessment Results: Decreased strength, Decreased endurance, Impaired balance, Decreased mobility  Rehab Prognosis: Good  Barriers to Discharge Home: Caregiver assistance, Physical needs  Caregiver Assistance: Caregiver assistance needed per identified barriers - however, level of patient's required assistance exceeds assistance available at home  Physical Needs: Stair navigation into home limited by function/safety, Intermittent mobility assistance needed, Intermittent ADL assistance needed, High falls risk due to function or environment  End of Session Communication: Bedside nurse  Assessment Comment: Recommend skilled PT at Mod intensity vs Low intensity with 24 A, for endurance building, dynamic gait training, overall strengthening. Paulina declining Mod at this time. spoke with JOSHUA Santoyo Care Corordinator  End of Session Patient Position: Alarm on, Up in chair     PT Plan  Treatment/Interventions: Bed mobility, Transfer training, Gait training, Stair training, Balance training, Strengthening, Endurance training, Therapeutic exercise  PT Plan: Ongoing PT  PT Frequency for Current Admission: 3 times per week during this acute inpatient hospitalization (During this acute inpatient hospitalizatin)  PT Discharge Recommendations: Moderate intensity level of continued care (Based on current functional status and rehab potential, patient is anticipated to tolerate and benefit from 5 or more days per week of skilled rehabilitative therapy after discharge from this acute inpatient hospitalization.)  Equipment Recommended upon Discharge: Wheeled walker  PT Recommended Transfer Status: Assist x1  PT - OK to Discharge: Yes    PT Visit  "Info:  PT Received On: 08/14/25     General Visit Information:   General  Reason for Referral: Impaired functional mobility; heart failure  Referred By: Toña Vergara  Past Medical History Relevant to Rehab: atrial fibrillation on Eliquis, hypertension, hyperlipidemia, hypothyroidism  Family/Caregiver Present: Yes (Grandson and niece)  Prior to Session Communication: Bedside nurse  General Comment: AXOX3, souse and grandsons at bedside. on 3L 02 with resting po2 reading of 99%, IV, folaey to CD, eager to get up. Angela RN cleared with resident OOB activity \"ok to get her up and we can try to wean her 02\"    Subjective   Precautions:  Precautions  Medical Precautions: Fall precautions (airvo (50/50), tele, heparin therapeutic)      Vital Signs Comment: po2 % during and after ambulation on 3L 02, nursing notified.     Objective   Pain:  Pain Assessment  Pain Assessment: 0-10  0-10 (Numeric) Pain Score: 0 - No pain  Cognition:  Cognition  Orientation Level: Oriented X4  Coordination:  Movements are Fluid and Coordinated: Yes  Postural Control:  Postural Control  Head Control: forward  Static Sitting Balance  Static Sitting-Balance Support: No upper extremity supported, Feet supported  Static Sitting-Level of Assistance: Close supervision  Dynamic Sitting Balance  Dynamic Sitting-Balance Support: No upper extremity supported, Feet supported  Dynamic Sitting-Level of Assistance: Close supervision  Dynamic Sitting-Balance: Forward lean  Static Standing Balance  Static Standing-Balance Support: Bilateral upper extremity supported (HHA)  Static Standing-Level of Assistance: Close supervision  Extremity/Trunk Assessments:    Activity Tolerance:  Activity Tolerance  Endurance: Tolerates 10 - 20 min exercise with multiple rests  Treatments:  Therapeutic Exercise  Therapeutic Exercise Performed: Yes  Therapeutic Exercise Activity 1: Long sitting: ankle pumps quad sets, hip abd/add x10 reps and sitting: heel toe raises, long " arc quads x10 reps bilat LE's for endurance and strengthening.                             Bed Mobility  Bed Mobility: Yes  Bed Mobility 1  Bed Mobility 1: Supine to sitting, Sitting to supine  Level of Assistance 1: Contact guard    Ambulation/Gait Training  Ambulation/Gait Training Performed: Yes  Ambulation/Gait Training 1  Surface 1: Level tile  Device 1: Rolling walker  Assistance 1: Hand held assistance  Quality of Gait 1: Inconsistent stride length, Decreased step length (decreased step length/height, initially a little unsteady, slight retrolean)  Comments/Distance (ft) 1: 20ftx1 then rested x4 minutes seated with 02 and pursed ip brathing, no c/ SOB, po2 99%, Ambulated 5ft forward and backward  Transfers  Transfer: Yes  Transfer 1  Transfer From 1: Sit to, Stand to  Transfer to 1: Sit, Stand  Technique 1: Sit to stand, Stand to sit  Transfer Level of Assistance 1: Contact guard, Minimal verbal cues, Minimal tactile cues  Trials/Comments 1: x3                          Outcome Measures:  [facility] Basic Mobility  Turning from your back to your side while in a flat bed without using bedrails: A little  Moving from lying on your back to sitting on the side of a flat bed without using bedrails: A little  Moving to and from bed to chair (including a wheelchair): A little  Standing up from a chair using your arms (e.g. wheelchair or bedside chair): A little  To walk in hospital room: A little  Climbing 3-5 steps with railing: A little  Basic Mobility - Total Score: 18    Education Documentation  Body Mechanics, taught by Kendra Acharya PTA at 8/14/2025  2:50 PM.  Learner: Patient  Readiness: Acceptance  Method: Explanation  Response: Verbalizes Understanding    ADL Training, taught by Kendra Acharya PTA at 8/14/2025  2:50 PM.  Learner: Patient  Readiness: Acceptance  Method: Explanation  Response: Verbalizes Understanding    Precautions, taught by Kendra Acharya PTA at 8/14/2025  2:50 PM.  Learner: Patient  Readiness:  Acceptance  Method: Explanation  Response: Verbalizes Understanding    Precautions, taught by Kendra Acharya PTA at 8/14/2025  2:50 PM.  Learner: Patient  Readiness: Acceptance  Method: Explanation  Response: Verbalizes Understanding    Mobility Training, taught by Kendra Acharya PTA at 8/14/2025  2:50 PM.  Learner: Patient  Readiness: Acceptance  Method: Explanation  Response: Verbalizes Understanding    Education Comments  No comments found.        OP EDUCATION:       Encounter Problems       Encounter Problems (Active)       Balance       STG - Maintains dynamic standing balance without upper extremity support x 5' with dual task SBA   (Progressing)       Start:  08/12/25    Expected End:  08/26/25               Mobility       STG - Patient will ambulate with LRD 25' supervision  (Progressing)       Start:  08/12/25    Expected End:  08/26/25            STG - Patient will ascend and descend 2 stairs with LRD supervision  (Progressing)       Start:  08/12/25    Expected End:  08/26/25               PT Transfers       STG - Patient will perform bed mobility independently  (Progressing)       Start:  08/12/25    Expected End:  08/26/25            STG - Patient will transfer sit to and from stand mod I  (Progressing)       Start:  08/12/25    Expected End:  08/26/25

## 2025-08-14 NOTE — PROGRESS NOTES
Susan Singh is a 89 y.o. female on day 6 of admission presenting with Acute systolic heart failure.      Subjective   No acute events overnight       Objective     Last Recorded Vitals  /62 (BP Location: Left arm, Patient Position: Lying)   Pulse 58   Temp 36.1 °C (97 °F) (Temporal)   Resp 17   Wt 49.8 kg (109 lb 12.8 oz)   SpO2 99%   Intake/Output last 3 Shifts:    Intake/Output Summary (Last 24 hours) at 8/14/2025 0925  Last data filed at 8/14/2025 0800  Gross per 24 hour   Intake 683.08 ml   Output 1450 ml   Net -766.92 ml       Admission Weight  Weight: 59 kg (130 lb) (08/08/25 0700)    Daily Weight  08/14/25 : 49.8 kg (109 lb 12.8 oz)    Image Results  XR chest 1 view  Narrative: Interpreted By:  Anika Sharp,   STUDY:  XR CHEST 1 VIEW; ;  8/13/2025 3:35 pm      INDICATION:  Signs/Symptoms:s/p thoracentesis on right. rule out pneumothorax.          COMPARISON:  Chest radiograph dated 08/10/2025      ACCESSION NUMBER(S):  [id number]      ORDERING CLINICIAN:  MARY JUÁREZ      FINDINGS:  Cardiac silhouette is similar in size and morphology. Dense  atherosclerotic calcifications of the aortic knob are noted. There is  interval decrease in right-sided pleural effusion compared to prior  radiograph, likely related to immediate prior thoracentesis. No  definite radiographic evidence of pneumothorax within limits of  portable technique. There is persistent evidence of moderate volume  left-sided pleural effusion with associated haziness in the left lung  base. Otherwise bilateral lungs are clear. No acute osseous findings.      Impression: 1. Interval decrease in right-sided pleural effusion compared to  immediate prior radiographs status post thoracentesis. No sizable  pneumothorax within limits of portable technique.  2. Redemonstration of moderate left-sided pleural effusion, similar  to slightly decreased in size compared to prior radiograph dated  08/10/2025.              MACRO:  None       Signed by: Anika Sharp 8/13/2025 4:19 PM  Dictation workstation:   [id number]      Physical Exam  Con: awake, alert; no distress;   Eyes: conjunctiva wnl; EOMI;   ENMT: hearing intact; MMM;   MSK: ROM wnl; digits wnl;   CV: Regular rhythm  Resp: normal work of breathing on HVNI; no cyanosis; diffuse rales  Neuro: moving all extremities; no abnormal movements  Psych: oriented to situation; affect wnl;      Assessment & Plan  Acute systolic heart failure    Acute hypoxic respiratory failure    Pleural effusion    Susan Singh is a 89 y.o. female with atrial fibrillation on Eliquis, hypertension, hyperlipidemia, hypothyroidism.  She presented with 2 days of worsening shortness of breath in the setting of 4 to 5 months of fatigue, lightheadedness, and intermittent syncope.  In the ED, she was found to be in atrial fibrillation with RVR to the 130s and hypoxic requiring 4 L O2.  Labs showed PEPE, elevated BNP, elevated troponin, and low TSH with elevated T4.  Chest x-ray showed cardiomegaly with basilar edema and moderate bilateral effusions.  POCUS in the ED suggested low EF. Cardiology consulted and patient initially started on amiodarone drip. She became more hypoxic on 8/10 requiring airvo and transfer to step down unit. VQ scan and DVT US negative. CXR did show worsening edema and bilateral pleural effusions with possible opacities. Pulm consulted, Eliquis held, and started on heparin drip in case of thoracentesis. CT chest wo con showed large bilateral pleural effusions and near total collapse of both lower lobes, patchy ground-glass infiltrate left apex.      Acute hypoxic respiratory failure due to acute decompensated systolic heart failure:   Weaned off Airvo now on supplemental oxygen 4 L a minute  VQ scan ordered 8/10 to r/o PE, negative   DVT US negative   CT chest wo con 8/10 showed large effusions and patchy ground glass infiltrate L apex  EF was normal on echo in 2019  Echo 8/8 showed EF 15%   S/P  right sided thoracentesis with 900 mL of clear yellow fluid removed  Pulmonology considering left sided thoracentesis t0day  IV diuresis w/ lasix drip at 5mg/hr   Net output of 1451 since admission,(not accurate do to urine loss around pure wick)  Goal K>4, Mg>2  PT/OT consulted     A-fib with RVR:   Currently in NSR  Cardiology consulted, S/p amio drip now on amio 200mg daily   Continue reduced dose of metop succinate 25mg daily   Per Cardiology may benefit from pacemaker implant, if so would be on Wednesday   Continue heparin gtt for possible thoracentesis      PEPE  Serum creatinine continues to trend up /likely due to diuresis  In review of chart serum creatinine baseline appears to be around 1.0  Losartan and Lasix drip held  Repeat renal function panel in a.m.    Ground Glass Pulm Nodule:  Will likely need repeat non-con chest CT in 6-12 months for pulmonary nodule      Non-MI troponin elevation due to above  Down trended with better rate control     Lightheadedness with intermittent syncope:  2/2 above      Hypertension/Hyperlipidemia:   Hold amlodipine, losartan; reintroduce as tolerated  Continue pravastatin      Hypothyroidism with thyrotoxicosis:  Potential trigger for above  Hold levothyroxine for now; decrease dose on discharge     DVT PPx: Heparin drip      Dispo: Inpatient  Anticipate likely 2-3 more midnights pending clinical improvement.           Antonio Harris DO

## 2025-08-14 NOTE — CARE PLAN
The patient's goals for the shift include      The clinical goals for the shift include Pt will deny SOB this shift      Problem: Heart Failure  Goal: Improved gas exchange this shift  Outcome: Progressing  Goal: Improved urinary output this shift  Outcome: Progressing  Goal: Reduction in peripheral edema within 24 hours  Outcome: Progressing  Goal: Report improvement of dyspnea/breathlessness this shift  Outcome: Progressing  Goal: Weight from fluid excess reduced over 2-3 days, then stabilize  Outcome: Progressing  Goal: Increase self care and/or family involvement in 24 hours  Outcome: Progressing     Problem: Pain - Adult  Goal: Verbalizes/displays adequate comfort level or baseline comfort level  Outcome: Progressing     Problem: Safety - Adult  Goal: Free from fall injury  Outcome: Progressing     Problem: Discharge Planning  Goal: Discharge to home or other facility with appropriate resources  Outcome: Progressing     Problem: Chronic Conditions and Co-morbidities  Goal: Patient's chronic conditions and co-morbidity symptoms are monitored and maintained or improved  Outcome: Progressing     Problem: Nutrition  Goal: Nutrient intake appropriate for maintaining nutritional needs  Outcome: Progressing     Problem: Skin  Goal: Participates in plan/prevention/treatment measures  Outcome: Progressing  Flowsheets (Taken 8/13/2025 2048 by Fanny Davidson RN)  Participates in plan/prevention/treatment measures: Elevate heels  Goal: Prevent/manage excess moisture  Outcome: Progressing  Flowsheets (Taken 8/13/2025 2048 by Fanny Davidson RN)  Prevent/manage excess moisture: Cleanse incontinence/protect with barrier cream  Goal: Prevent/minimize sheer/friction injuries  Outcome: Progressing  Flowsheets (Taken 8/13/2025 2048 by Fanny Davidson RN)  Prevent/minimize sheer/friction injuries:   Use pull sheet   Turn/reposition every 2 hours/use positioning/transfer devices  Goal: Promote/optimize nutrition  Outcome:  Progressing  Flowsheets (Taken 8/13/2025 2048 by Fanny Davidson, RN)  Promote/optimize nutrition: Monitor/record intake including meals  Goal: Promote skin healing  Outcome: Progressing  Flowsheets (Taken 8/13/2025 2048 by Fanny Davidson, RN)  Promote skin healing: Turn/reposition every 2 hours/use positioning/transfer devices     Problem: Fall/Injury  Goal: Not fall by end of shift  Outcome: Progressing  Goal: Be free from injury by end of the shift  Outcome: Progressing  Goal: Verbalize understanding of personal risk factors for fall in the hospital  Outcome: Progressing  Goal: Verbalize understanding of risk factor reduction measures to prevent injury from fall in the home  Outcome: Progressing  Goal: Use assistive devices by end of the shift  Outcome: Progressing  Goal: Pace activities to prevent fatigue by end of the shift  Outcome: Progressing     Problem: Respiratory  Goal: Clear secretions with interventions this shift  Outcome: Progressing  Goal: Minimize anxiety/maximize coping throughout shift  Outcome: Progressing  Goal: Minimal/no exertional discomfort or dyspnea this shift  Outcome: Progressing  Goal: No signs of respiratory distress (eg. Use of accessory muscles. Peds grunting)  Outcome: Progressing  Goal: Patent airway maintained this shift  Outcome: Progressing  Goal: Tolerate mechanical ventilation evidenced by VS/agitation level this shift  Outcome: Progressing  Goal: Tolerate pulmonary toileting this shift  Outcome: Progressing  Goal: Verbalize decreased shortness of breath this shift  Outcome: Progressing  Goal: Wean oxygen to maintain O2 saturation per order/standard this shift  Outcome: Progressing  Goal: Increase self care and/or family involvement in next 24 hours  Outcome: Progressing

## 2025-08-14 NOTE — PROGRESS NOTES
"Susan Singh is a 89 y.o. female on day 6 of admission presenting with Acute systolic heart failure.    Subjective   Breathing is much better today. Was found this AM on 1.5L/min O2       Objective   GEN: sitting up in chair. Breathing non-labored  ENT: wearing O2 nasal cannula  CV: RRR, no m/g/r  LUNGS: good effort, diminished at left base  EXT: no edema, cyanosis, clubbing    Physical Exam    Last Recorded Vitals  Blood pressure 138/62, pulse 58, temperature 36.1 °C (97 °F), temperature source Temporal, resp. rate 17, height 1.626 m (5' 4\"), weight 49.8 kg (109 lb 12.8 oz), SpO2 99%.  Intake/Output last 3 Shifts:  I/O last 3 completed shifts:  In: 579 (11.6 mL/kg) [P.O.:480; I.V.:99 (2 mL/kg)]  Out: 1975 (39.7 mL/kg) [Urine:1975 (1.1 mL/kg/hr)]  Weight: 49.8 kg     Relevant Results  Scheduled medications  amiodarone, 200 mg, oral, Daily  [Held by provider] amLODIPine, 5 mg, oral, Daily  [Held by provider] apixaban, 2.5 mg, oral, BID  heparin, 60 Units/kg, intravenous, Once  [Held by provider] levothyroxine, 100 mcg, oral, Daily  [Held by provider] losartan, 100 mg, oral, Daily  metoprolol succinate XL, 25 mg, oral, Daily  polyethylene glycol, 17 g, oral, Daily  pravastatin, 40 mg, oral, Daily  sennosides-docusate sodium, 2 tablet, oral, BID      Continuous medications  Continuous Medications[1]  PRN medications  PRN Medications[2]                     Latest Reference Range & Units 08/13/25 15:14   Albumin, Fluid Not established g/dL 1.6   Cholesterol, Fluid Not established mg/dL <25   Glucose, Fluid Not established mg/dL 148   LD, Fluid Not established. U/L 87   Protein, Total Fluid Not established g/dL 2.3   Color, Fluid Colorless, Straw, Yellow  Yellow   WBC, Fluid See Comment /uL 297   Neutrophils %, Manual, Fluid see comment % 21   Lymphocytes %, Manual, Fluid see comment % 11   Mono/Macrophages %, Manual, Fluid see comment % 68   pH, Fluid See Below  8.27       Assessment & Plan  Acute systolic heart " failure    Acute hypoxic respiratory failure    Pleural effusion    Summary:  89 y.o. female with severe HFrEF admitted on 8/8/2025  6:51 AM for dyspnea, Afib with RVR. She has had increasing O2 demands and is now on Airvo. Performed bedside ultrasound 8/11/2025 which demonstrated bilateral moderate pleural effusions. Thoracentesis on right of 900mL 8/13/2025. Studies consistent with transudate. Was able to wean down to room air today.        Recommendations:  -continue diuresis, and medical management  -not requiring any more supplemental O2. Will defer thoracentesis at this time  -ok to transition to oral anticoagulants  -will sign off.     Clayton Paredes, DO  Pulmonary & Critical Care  8/14/2025 12:31 PM         [1] [Held by provider] furosemide, 5 mg/hr, Last Rate: Stopped (08/14/25 1224)  heparin, 0-4,000 Units/hr, Last Rate: 600 Units/hr (08/14/25 2357)  [2] PRN medications: acetaminophen, famotidine, heparin, melatonin, ondansetron, oxygen, oxygen

## 2025-08-15 ENCOUNTER — PHARMACY VISIT (OUTPATIENT)
Dept: PHARMACY | Facility: CLINIC | Age: OVER 89
End: 2025-08-15
Payer: COMMERCIAL

## 2025-08-15 ENCOUNTER — HOME HEALTH ADMISSION (OUTPATIENT)
Dept: HOME HEALTH SERVICES | Facility: HOME HEALTH | Age: OVER 89
End: 2025-08-15
Payer: MEDICARE

## 2025-08-15 ENCOUNTER — DOCUMENTATION (OUTPATIENT)
Dept: HOME HEALTH SERVICES | Facility: HOME HEALTH | Age: OVER 89
End: 2025-08-15
Payer: MEDICARE

## 2025-08-15 VITALS
TEMPERATURE: 98.2 F | WEIGHT: 110.04 LBS | HEART RATE: 66 BPM | SYSTOLIC BLOOD PRESSURE: 130 MMHG | RESPIRATION RATE: 18 BRPM | OXYGEN SATURATION: 94 % | DIASTOLIC BLOOD PRESSURE: 63 MMHG | BODY MASS INDEX: 18.79 KG/M2 | HEIGHT: 64 IN

## 2025-08-15 PROBLEM — I50.21 ACUTE SYSTOLIC HEART FAILURE: Status: RESOLVED | Noted: 2025-08-08 | Resolved: 2025-08-15

## 2025-08-15 PROBLEM — J90 PLEURAL EFFUSION: Status: RESOLVED | Noted: 2025-08-12 | Resolved: 2025-08-15

## 2025-08-15 PROBLEM — J96.01 ACUTE HYPOXIC RESPIRATORY FAILURE: Status: RESOLVED | Noted: 2025-08-12 | Resolved: 2025-08-15

## 2025-08-15 LAB
ALBUMIN SERPL BCP-MCNC: 2.9 G/DL (ref 3.4–5)
ANION GAP SERPL CALC-SCNC: 10 MMOL/L (ref 10–20)
BUN SERPL-MCNC: 29 MG/DL (ref 6–23)
CALCIUM SERPL-MCNC: 8.2 MG/DL (ref 8.6–10.3)
CHLORIDE SERPL-SCNC: 95 MMOL/L (ref 98–107)
CO2 SERPL-SCNC: 35 MMOL/L (ref 21–32)
CREAT SERPL-MCNC: 1.33 MG/DL (ref 0.5–1.05)
EGFRCR SERPLBLD CKD-EPI 2021: 36 ML/MIN/1.73M*2
ERYTHROCYTE [DISTWIDTH] IN BLOOD BY AUTOMATED COUNT: 14.5 % (ref 11.5–14.5)
GLUCOSE SERPL-MCNC: 91 MG/DL (ref 74–99)
HCT VFR BLD AUTO: 41 % (ref 36–46)
HGB BLD-MCNC: 12.8 G/DL (ref 12–16)
MCH RBC QN AUTO: 27.5 PG (ref 26–34)
MCHC RBC AUTO-ENTMCNC: 31.2 G/DL (ref 32–36)
MCV RBC AUTO: 88 FL (ref 80–100)
NRBC BLD-RTO: 0 /100 WBCS (ref 0–0)
PHOSPHATE SERPL-MCNC: 3.9 MG/DL (ref 2.5–4.9)
PLATELET # BLD AUTO: 224 X10*3/UL (ref 150–450)
POTASSIUM SERPL-SCNC: 3.6 MMOL/L (ref 3.5–5.3)
RBC # BLD AUTO: 4.65 X10*6/UL (ref 4–5.2)
SODIUM SERPL-SCNC: 136 MMOL/L (ref 136–145)
UFH PPP CHRO-ACNC: 0.6 IU/ML (ref ?–1.1)
WBC # BLD AUTO: 5.3 X10*3/UL (ref 4.4–11.3)

## 2025-08-15 PROCEDURE — 85520 HEPARIN ASSAY: CPT | Performed by: FAMILY MEDICINE

## 2025-08-15 PROCEDURE — 85027 COMPLETE CBC AUTOMATED: CPT | Performed by: INTERNAL MEDICINE

## 2025-08-15 PROCEDURE — 36415 COLL VENOUS BLD VENIPUNCTURE: CPT

## 2025-08-15 PROCEDURE — 2500000004 HC RX 250 GENERAL PHARMACY W/ HCPCS (ALT 636 FOR OP/ED): Performed by: STUDENT IN AN ORGANIZED HEALTH CARE EDUCATION/TRAINING PROGRAM

## 2025-08-15 PROCEDURE — 80069 RENAL FUNCTION PANEL: CPT

## 2025-08-15 PROCEDURE — 2500000001 HC RX 250 WO HCPCS SELF ADMINISTERED DRUGS (ALT 637 FOR MEDICARE OP): Performed by: STUDENT IN AN ORGANIZED HEALTH CARE EDUCATION/TRAINING PROGRAM

## 2025-08-15 PROCEDURE — 2500000001 HC RX 250 WO HCPCS SELF ADMINISTERED DRUGS (ALT 637 FOR MEDICARE OP): Performed by: INTERNAL MEDICINE

## 2025-08-15 PROCEDURE — 2500000001 HC RX 250 WO HCPCS SELF ADMINISTERED DRUGS (ALT 637 FOR MEDICARE OP): Performed by: NURSE PRACTITIONER

## 2025-08-15 PROCEDURE — 2500000002 HC RX 250 W HCPCS SELF ADMINISTERED DRUGS (ALT 637 FOR MEDICARE OP, ALT 636 FOR OP/ED): Performed by: INTERNAL MEDICINE

## 2025-08-15 PROCEDURE — RXMED WILLOW AMBULATORY MEDICATION CHARGE

## 2025-08-15 PROCEDURE — 99239 HOSP IP/OBS DSCHRG MGMT >30: CPT | Performed by: FAMILY MEDICINE

## 2025-08-15 PROCEDURE — 2500000004 HC RX 250 GENERAL PHARMACY W/ HCPCS (ALT 636 FOR OP/ED): Performed by: FAMILY MEDICINE

## 2025-08-15 RX ORDER — FUROSEMIDE 40 MG/1
40 TABLET ORAL DAILY
Status: DISCONTINUED | OUTPATIENT
Start: 2025-08-15 | End: 2025-08-15 | Stop reason: HOSPADM

## 2025-08-15 RX ORDER — LEVOTHYROXINE SODIUM 88 UG/1
88 TABLET ORAL DAILY
Qty: 30 TABLET | Refills: 0 | Status: SHIPPED | OUTPATIENT
Start: 2025-08-15 | End: 2025-08-15

## 2025-08-15 RX ORDER — LEVOTHYROXINE SODIUM 88 UG/1
88 TABLET ORAL DAILY
Qty: 30 TABLET | Refills: 0 | Status: SHIPPED | OUTPATIENT
Start: 2025-08-15

## 2025-08-15 RX ORDER — METOPROLOL SUCCINATE 25 MG/1
25 TABLET, EXTENDED RELEASE ORAL DAILY
Qty: 30 TABLET | Refills: 0 | Status: SHIPPED | OUTPATIENT
Start: 2025-08-16

## 2025-08-15 RX ORDER — AMIODARONE HYDROCHLORIDE 200 MG/1
200 TABLET ORAL DAILY
Qty: 30 TABLET | Refills: 0 | Status: SHIPPED | OUTPATIENT
Start: 2025-08-16

## 2025-08-15 RX ORDER — AMIODARONE HYDROCHLORIDE 200 MG/1
200 TABLET ORAL DAILY
Qty: 30 TABLET | Refills: 0 | Status: SHIPPED | OUTPATIENT
Start: 2025-08-16 | End: 2025-08-15

## 2025-08-15 RX ORDER — METOPROLOL SUCCINATE 25 MG/1
25 TABLET, EXTENDED RELEASE ORAL DAILY
Qty: 30 TABLET | Refills: 0 | Status: SHIPPED | OUTPATIENT
Start: 2025-08-16 | End: 2025-08-15

## 2025-08-15 RX ORDER — FUROSEMIDE 40 MG/1
40 TABLET ORAL DAILY
Qty: 30 TABLET | Refills: 0 | Status: SHIPPED | OUTPATIENT
Start: 2025-08-16

## 2025-08-15 RX ORDER — FUROSEMIDE 40 MG/1
40 TABLET ORAL DAILY
Qty: 30 TABLET | Refills: 0 | Status: SHIPPED | OUTPATIENT
Start: 2025-08-16 | End: 2025-08-15

## 2025-08-15 RX ORDER — LOSARTAN POTASSIUM 50 MG/1
50 TABLET ORAL DAILY
Status: DISCONTINUED | OUTPATIENT
Start: 2025-08-15 | End: 2025-08-15 | Stop reason: HOSPADM

## 2025-08-15 RX ORDER — LOSARTAN POTASSIUM 50 MG/1
50 TABLET ORAL DAILY
Qty: 30 TABLET | Refills: 0 | Status: SHIPPED | OUTPATIENT
Start: 2025-08-16 | End: 2025-08-15

## 2025-08-15 RX ORDER — LOSARTAN POTASSIUM 50 MG/1
50 TABLET ORAL DAILY
Qty: 30 TABLET | Refills: 0 | Status: SHIPPED | OUTPATIENT
Start: 2025-08-16

## 2025-08-15 RX ADMIN — FUROSEMIDE 40 MG: 40 TABLET ORAL at 09:31

## 2025-08-15 RX ADMIN — APIXABAN 2.5 MG: 2.5 TABLET, FILM COATED ORAL at 09:31

## 2025-08-15 RX ADMIN — LOSARTAN POTASSIUM 50 MG: 50 TABLET, FILM COATED ORAL at 09:31

## 2025-08-15 RX ADMIN — METOPROLOL SUCCINATE 25 MG: 50 TABLET, EXTENDED RELEASE ORAL at 09:26

## 2025-08-15 RX ADMIN — SENNOSIDES AND DOCUSATE SODIUM 2 TABLET: 50; 8.6 TABLET ORAL at 09:23

## 2025-08-15 RX ADMIN — PRAVASTATIN SODIUM 40 MG: 40 TABLET ORAL at 09:23

## 2025-08-15 RX ADMIN — EMPAGLIFLOZIN 10 MG: 10 TABLET, FILM COATED ORAL at 09:36

## 2025-08-15 RX ADMIN — HEPARIN SODIUM 600 UNITS/HR: 10000 INJECTION, SOLUTION INTRAVENOUS at 07:25

## 2025-08-15 RX ADMIN — POLYETHYLENE GLYCOL 3350 17 G: 17 POWDER, FOR SOLUTION ORAL at 09:23

## 2025-08-15 RX ADMIN — AMIODARONE HYDROCHLORIDE 200 MG: 200 TABLET ORAL at 09:23

## 2025-08-15 ASSESSMENT — PAIN SCALES - GENERAL
PAINLEVEL_OUTOF10: 0 - NO PAIN
PAINLEVEL_OUTOF10: 0 - NO PAIN

## 2025-08-15 ASSESSMENT — PAIN - FUNCTIONAL ASSESSMENT
PAIN_FUNCTIONAL_ASSESSMENT: 0-10
PAIN_FUNCTIONAL_ASSESSMENT: 0-10

## 2025-08-15 NOTE — DISCHARGE SUMMARY
Discharge Diagnosis  Acute hypoxic respiratory failure due to acute decompensated systolic heart failure, atrial fibrillation with RVR, acute kidney injury, groundglass pulmonary nodule, non-MI related troponin elevation due to respiratory failure and atrial fibrillation lightheadedness and syncope due to systolic heart failure and atrial fibrillation with RVR, hypertension, hyperlipidemia, hypothyroidism with thyrotoxicosis    Issues Requiring Follow-Up  Please follow-up with primary care provider for hospital follow-up, continue care chronic diseases and to repeat a noncontrast chest CT in 6 to 12 months for a pulmonary nodule  Follow-up with cardiology for continued management of CHF and atrial fibrillation    Discharge Meds     Medication List      START taking these medications     amiodarone 200 mg tablet; Commonly known as: Pacerone; Take 1 tablet   (200 mg) by mouth once daily. Do not fill before August 16, 2025.; Start   taking on: August 16, 2025   empagliflozin 10 mg tablet; Commonly known as: Jardiance; Take 1 tablet   (10 mg) by mouth once daily. Do not fill before August 16, 2025.; Start   taking on: August 16, 2025   furosemide 40 mg tablet; Commonly known as: Lasix; Take 1 tablet (40 mg)   by mouth once daily. Do not fill before August 16, 2025.; Start taking on:   August 16, 2025   metoprolol succinate XL 25 mg 24 hr tablet; Commonly known as:   Toprol-XL; Take 1 tablet (25 mg) by mouth once daily. Do not crush or   chew. Do not fill before August 16, 2025.; Start taking on: August 16, 2025     CHANGE how you take these medications     levothyroxine 88 mcg tablet; Commonly known as: Synthroid, Levoxyl; Take   1 tablet (88 mcg) by mouth early in the morning.. Take on an empty stomach   at the same time each day, either 30 to 60 minutes prior to breakfast;   What changed: medication strength, how much to take   losartan 50 mg tablet; Commonly known as: Cozaar; Take 1 tablet (50 mg)   by mouth once  daily. Do not fill before August 16, 2025.; Start taking on:   August 16, 2025; What changed: medication strength, how much to take     CONTINUE taking these medications     apixaban 2.5 mg tablet; Commonly known as: Eliquis; Take 1 tablet (2.5   mg) by mouth 2 times a day.   CALCIUM CITRATE-VITAMIN D3 ORAL   pravastatin 40 mg tablet; Commonly known as: Pravachol; Take 1 tablet   (40 mg) by mouth once daily.     STOP taking these medications     amLODIPine 5 mg tablet; Commonly known as: Norvasc   ergocalciferol 1250 mcg (50,000 units) capsule; Commonly known as:   Vitamin D-2   metoprolol tartrate 100 mg tablet; Commonly known as: Lopressor       Test Results Pending At Discharge  Pending Labs       Order Current Status    Cytology Consultation (Non-Gynecologic) In process    Sterile Fluid Culture/Smear Preliminary result            Hospital Course  Susan Singh is a 89 y.o. female with atrial fibrillation on Eliquis, hypertension, hyperlipidemia, hypothyroidism.  She presented 8/8/25 with 2 days of worsening shortness of breath in the setting of 4 to 5 months of fatigue, lightheadedness, and intermittent syncope.  In the ED, she was found to be in atrial fibrillation with RVR to the 130s and hypoxic requiring 4 L O2.  Labs showed PEPE, elevated BNP, elevated troponin, and low TSH with elevated T4.  Chest x-ray showed cardiomegaly with basilar edema and moderate bilateral effusions.  POCUS in the ED suggested low EF. Cardiology consulted and patient initially started on amiodarone drip and transitioned to oral amiodarone. Metoprolol succinate reduced to 25mg daily and patient was diuresed with lasix drip. She became more hypoxic on 8/10 requiring airvo and transfer to step down unit. VQ scan and DVT US negative. CXR did show worsening edema and bilateral pleural effusions with possible opacities. Pulm consulted, Eliquis held, and started on heparin drip in case of thoracentesis. CT chest wo con showed large  bilateral pleural effusions and near total collapse of both lower lobes, patchy ground-glass infiltrate left apex.     Will need outpatient non-con chest CT in 6-12 months for ground glass pulmonary nodule measuring 6mm or larger.    Patient underwent a thoracentesis of the right side and 900 cc of clear yellow fluid was removed.  With continued diuresis and thoracentesis her oxygen demand improved and we are able to eventually wean her off of supplemental oxygen.  Since she no longer needs supplemental oxygen thoracentesis of the left side was deferred.    PT OT evaluated the patient and recommended a moderate intensity in therapy however patient and family preferred going home with home health care.    Greater than 30 minutes were spent in evaluating chart, evaluating patient, preparing chart for discharge and discussing plan with patient and family.    Pertinent Physical Exam At Time of Discharge  Physical Exam  Con: awake, alert; no distress;   Eyes: conjunctiva wnl; EOMI;   ENMT: hearing intact; MMM;   MSK: ROM wnl; digits wnl;   CV: Regular rhythm  Resp: CTA, normal work of breathing  Neuro: moving all extremities; no abnormal movements  Psych: oriented to situation; affect wnl;    Outpatient Follow-Up  No future appointments.      Antonio Harris DO

## 2025-08-15 NOTE — CARE PLAN
The patient's goals for the shift include      The clinical goals for the shift include patient's HR will be WNL      Problem: Heart Failure  Goal: Improved gas exchange this shift  Outcome: Progressing  Goal: Improved urinary output this shift  Outcome: Progressing  Goal: Reduction in peripheral edema within 24 hours  Outcome: Progressing  Goal: Report improvement of dyspnea/breathlessness this shift  Outcome: Progressing  Goal: Weight from fluid excess reduced over 2-3 days, then stabilize  Outcome: Progressing  Goal: Increase self care and/or family involvement in 24 hours  Outcome: Progressing     Problem: Pain - Adult  Goal: Verbalizes/displays adequate comfort level or baseline comfort level  Outcome: Progressing     Problem: Safety - Adult  Goal: Free from fall injury  Outcome: Progressing     Problem: Discharge Planning  Goal: Discharge to home or other facility with appropriate resources  Outcome: Progressing     Problem: Chronic Conditions and Co-morbidities  Goal: Patient's chronic conditions and co-morbidity symptoms are monitored and maintained or improved  Outcome: Progressing     Problem: Nutrition  Goal: Nutrient intake appropriate for maintaining nutritional needs  Outcome: Progressing     Problem: Skin  Goal: Participates in plan/prevention/treatment measures  Outcome: Progressing  Flowsheets (Taken 8/15/2025 1056)  Participates in plan/prevention/treatment measures:   Discuss with provider PT/OT consult   Elevate heels   Increase activity/out of bed for meals  Goal: Prevent/manage excess moisture  Outcome: Progressing  Flowsheets (Taken 8/15/2025 1056)  Prevent/manage excess moisture:   Cleanse incontinence/protect with barrier cream   Monitor for/manage infection if present   Follow provider orders for dressing changes  Goal: Prevent/minimize sheer/friction injuries  Outcome: Progressing  Flowsheets (Taken 8/15/2025 1056)  Prevent/minimize sheer/friction injuries:   Complete micro-shifts as  needed if patient unable. Adjust patient position to relieve pressure points, not a full turn   Increase activity/out of bed for meals   Use pull sheet   HOB 30 degrees or less  Goal: Promote/optimize nutrition  Outcome: Progressing  Flowsheets (Taken 8/15/2025 1056)  Promote/optimize nutrition:   Assist with feeding   Monitor/record intake including meals   Consume > 50% meals/supplements  Goal: Promote skin healing  Outcome: Progressing  Flowsheets (Taken 8/15/2025 1056)  Promote skin healing:   Assess skin/pad under line(s)/device(s)   Protective dressings over bony prominences   Turn/reposition every 2 hours/use positioning/transfer devices     Problem: Fall/Injury  Goal: Not fall by end of shift  Outcome: Progressing  Goal: Be free from injury by end of the shift  Outcome: Progressing  Goal: Verbalize understanding of personal risk factors for fall in the hospital  Outcome: Progressing  Goal: Verbalize understanding of risk factor reduction measures to prevent injury from fall in the home  Outcome: Progressing  Goal: Use assistive devices by end of the shift  Outcome: Progressing  Goal: Pace activities to prevent fatigue by end of the shift  Outcome: Progressing     Problem: Respiratory  Goal: Clear secretions with interventions this shift  Outcome: Progressing  Goal: Minimize anxiety/maximize coping throughout shift  Outcome: Progressing  Goal: Minimal/no exertional discomfort or dyspnea this shift  Outcome: Progressing  Goal: No signs of respiratory distress (eg. Use of accessory muscles. Peds grunting)  Outcome: Progressing  Goal: Patent airway maintained this shift  Outcome: Progressing  Goal: Tolerate mechanical ventilation evidenced by VS/agitation level this shift  Outcome: Progressing  Goal: Tolerate pulmonary toileting this shift  Outcome: Progressing  Goal: Verbalize decreased shortness of breath this shift  Outcome: Progressing  Goal: Wean oxygen to maintain O2 saturation per order/standard this  shift  Outcome: Progressing  Goal: Increase self care and/or family involvement in next 24 hours  Outcome: Progressing

## 2025-08-15 NOTE — CARE PLAN
The patient's goals for the shift include  rest.     The clinical goals for the shift include Pt. will be without signs of respiratory distress during this shift.        Problem: Pain - Adult  Goal: Verbalizes/displays adequate comfort level or baseline comfort level  Outcome: Progressing     Problem: Safety - Adult  Goal: Free from fall injury  Outcome: Progressing

## 2025-08-15 NOTE — PROGRESS NOTES
Susan Singh is a 89 y.o. female on day 7 of admission presenting with Acute systolic heart failure.      Subjective   She is sitting up in the bed, no complaints. Currently on room air.     Telemetry reviewed, SR, intermittent PAF      Review of systems:  Constitutional: negative for fever, chills, or malaise  Neuro: negative for dizziness, headache, numbness, tingling  ENT: Negative for nasal congestion or sore throat  Resp: positive for shortness of breath, cough, or wheezing  CV: negative for chest pain, palpitations  GI: negative for abd pain, nausea, vomiting or diarrhea  : negative for dysuria, frequency, or urgency  Skin: negative for lesions, wounds, or rash  Musculoskeletal: Negative for weakness, myalgia, or arthralgia  Endocrine: Negative for polyuria or polydipsia       Objective   Constitutional: Well developed, awake/alert/oriented x3, no distress, alert and cooperative  Eyes: PERRL, EOMI, clear sclera  ENMT: mucous membranes moist, no apparent injury, no lesions seen  Head/Neck: Neck supple, no apparent injury, thyroid without mass or tenderness, No JVD, trachea midline, no bruits  Respiratory/Thorax: Patent airways, diminished left lower lobe, improved aeration right sided  Cardiovascular: Regular, rate and rhythm, no murmurs, 2+ equal pulses of the extremities, normal S 1and S 2  Gastrointestinal: Nondistended, soft, non-tender, no rebound tenderness or guarding, no masses palpable, no organomegaly, +BS, no bruits  Musculoskeletal: ROM intact, no joint swelling, normal strength  Extremities: normal extremities, no cyanosis edema, contusions or wounds, no clubbing  Neurological: alert and oriented x3, intact senses, motor, response and reflexes, normal strength  Lymphatic: No significant lymphadenopathy  Psychological: Appropriate mood and behavior  Skin: Warm and dry, no lesions, no rashes      Last Recorded Vitals  /62 (BP Location: Right arm, Patient Position: Sitting)   Pulse 72   Temp  "36.7 °C (98.1 °F) (Temporal)   Resp 16   Ht 1.626 m (5' 4\")   Wt 49.9 kg (110 lb 0.6 oz)   SpO2 93%   BMI 18.89 kg/m²     Intake/Output last 3 Shifts:  I/O last 3 completed shifts:  In: 1025.9 (20.6 mL/kg) [P.O.:810; I.V.:215.9 (4.3 mL/kg)]  Out: 1150 (23 mL/kg) [Urine:1150 (0.6 mL/kg/hr)]  Weight: 49.9 kg   I/O this shift:  In: 240 [P.O.:240]  Out: -     Relevant Results  Scheduled medications  Scheduled Medications[1]  Continuous medications  Continuous Medications[2]  PRN medications  PRN Medications[3]    Results for orders placed or performed during the hospital encounter of 08/08/25 (from the past 24 hours)   Renal function panel   Result Value Ref Range    Glucose 91 74 - 99 mg/dL    Sodium 136 136 - 145 mmol/L    Potassium 3.6 3.5 - 5.3 mmol/L    Chloride 95 (L) 98 - 107 mmol/L    Bicarbonate 35 (H) 21 - 32 mmol/L    Anion Gap 10 10 - 20 mmol/L    Urea Nitrogen 29 (H) 6 - 23 mg/dL    Creatinine 1.33 (H) 0.50 - 1.05 mg/dL    eGFR 36 (L) >60 mL/min/1.73m*2    Calcium 8.2 (L) 8.6 - 10.3 mg/dL    Phosphorus 3.9 2.5 - 4.9 mg/dL    Albumin 2.9 (L) 3.4 - 5.0 g/dL   Heparin Assay   Result Value Ref Range    Heparin Unfractionated 0.6 See Comment Below for Therapeutic Ranges IU/mL   CBC   Result Value Ref Range    WBC 5.3 4.4 - 11.3 x10*3/uL    nRBC 0.0 0.0 - 0.0 /100 WBCs    RBC 4.65 4.00 - 5.20 x10*6/uL    Hemoglobin 12.8 12.0 - 16.0 g/dL    Hematocrit 41.0 36.0 - 46.0 %    MCV 88 80 - 100 fL    MCH 27.5 26.0 - 34.0 pg    MCHC 31.2 (L) 32.0 - 36.0 g/dL    RDW 14.5 11.5 - 14.5 %    Platelets 224 150 - 450 x10*3/uL       XR chest 1 view   Final Result   1. Interval decrease in right-sided pleural effusion compared to   immediate prior radiographs status post thoracentesis. No sizable   pneumothorax within limits of portable technique.   2. Redemonstration of moderate left-sided pleural effusion, similar   to slightly decreased in size compared to prior radiograph dated   08/10/2025.                  MACRO: "   None        Signed by: Anika Sharp 8/13/2025 4:19 PM   Dictation workstation:   [id number]      CT chest wo IV contrast   Final Result   1. Large bilateral pleural effusions are present along with near   total collapse of both lower lobes.   2. Remote granulomatous disease of the chest and abdomen.   3. Findings compatible with multiple left renal cysts.   4. Patchy ground-glass infiltrate left apex        MACRO:   Incidental Finding:   A ground glass pulmonary nodule measuring 6 mm   or larger.  (**-YCF-**)        Instructions:  Consider follow up non contrast chest CT at 6-12   months to confirm persistence, then consider CT chest every 2 years   until 5 years. (Sarkis Batres et al., Guidelines for management of   incidental pulmonary nodules detected on CT images: From the   Fleischner Society 2017, Radiology. 2017 Jul;284 (1):228-243.)   [email or web address]        Signed by: Kale Thorpe 8/11/2025 8:37 AM   Dictation workstation:   [id number]      Vascular US lower extremity venous duplex bilateral   Final Result   Suboptimal visualization of the calf veins. No sonographic evidence   DVT in the visualized vessels of bilateral lower extremities.        MACRO:   None        Signed by: Jose Jimenez 8/10/2025 7:06 PM   Dictation workstation:   [id number]      NM Lung perfusion with spect/ct   Final Result   The perfusion of both lungs is within normal limits with no evidence   for acute pulmonary embolism.        Moderate bilateral pleural effuion, more severe on right than left             The interpretation above is based on modified PIOPED II and PISAPED   criteria.        This study was analyzed and interpreted at [place], [place].             Signed by: Saul Kingston 8/10/2025 4:35 PM   Dictation workstation:   [id number]      XR chest 1 view   Final Result        Worsening basilar edema with bilateral pleural effusions and airspace   opacities increasing in size.         No evidence of pneumothorax.        Signed by: Leobardo Leal 8/10/2025 6:19 PM   Dictation workstation:   [id number]      Transthoracic Echo Complete   Final Result      XR chest 1 view   Final Result   Cardiomegaly with basilar edema and moderate-sized bilateral   effusions.        Signed by: Leobardo Leal 8/8/2025 7:35 AM   Dictation workstation:   [id number]      CT head wo IV contrast   Final Result   NO ACUTE INTRACRANIAL PROCESS. SKULL INTACT        MACRO:   None        Signed by: Ranjith Prado 8/8/2025 7:36 AM   Dictation workstation:   [id number]          Transthoracic Echo Complete  Result Date: 8/9/2025   [facility], [address] [place] [address]               Tel [phone] and Fax [phone] TRANSTHORACIC ECHOCARDIOGRAM REPORT  Patient Name:       CHICHI BOYCE        Isabel Physician:    [id number] Juan Kinney MD Study Date:         8/8/2025            Ordering Provider:    [id number] HEIDY LUU MRN/PID:            [id number]            Fellow: Accession#:         [id number]        Nurse:                Jackie Alexander RN Date of Birth/Age:  1/28/1928 / 89      Sonographer:          Tomasa luu                                     OSKAR Gender assigned at  F                   Additional Staff: Birth: Height:             162.56 cm           Admit Date:           8/8/2025 Weight:             104.33 kg           Admission Status:     Inpatient -                                                               Routine BSA / BMI:          2.08 m2 / 39.48     Encounter#:           [id number]                     kg/m2 Blood Pressure:     139/83 mmHg         Department Location:  [facility] Non                                                               Invasive Study Type:    TRANSTHORACIC ECHO (TTE) COMPLETE Diagnosis/ICD: Acute  systolic (congestive) heart failure (CHF)-I50.21 Indication:    Congestive Heart Failure CPT Code:      Echo Complete w Full Doppler-40058 Patient History: Pertinent History: HTN, A-Fib and Dyspnea. Study Detail: The following Echo studies were performed: 2D, M-Mode, Doppler and               color flow. Definity used as a contrast agent for endocardial               border definition. Total contrast used for this procedure was 2.0               mL via IV push. The patient was awake.  PHYSICIAN INTERPRETATION: Left Ventricle: The left ventricular systolic function is severely decreased with a visually estimated ejection fraction of 15%. There is global hypokinesis of the left ventricle with minor regional variations. The left ventricular cavity size is mildly dilated. There is normal septal and normal posterior left ventricular wall thickness. There is left ventricular concentric remodeling. Spectral Doppler shows a Grade III (restrictive pattern) of left ventricular diastolic filling with an elevated left atrial pressure. Left Atrium: The left atrium is mildly dilated. Right Ventricle: The right ventricle is mildly enlarged. There is moderately reduced right ventricular systolic function. Right Atrium: The right atrium is mildly dilated. Aortic Valve: The aortic valve is trileaflet. The aortic valve area by VTI is 1.89 cmÂ² with a peak velocity of 0.88 m/s. The peak and mean gradients are 3 mmHg and 2 mmHg, respectively with a dimensionless index of 0.70. There is moderate aortic valve thickening. There is mild aortic valve regurgitation. Mitral Valve: The mitral valve is normal in structure. The doppler estimated peak and mean diastolic pressure gradients are 1.8 mmHg and 1 mmHg, respectively. The mean gradient of the mitral valve is 1 mmHg. There is mild mitral valve regurgitation. The E Vmax is 0.65 m/s. Tricuspid Valve: The tricuspid valve is structurally normal. There is moderate tricuspid regurgitation. The  Doppler estimated right ventricular systolic pressure (RVSP) is mildly elevated at 38 mmHg. Pulmonic Valve: The pulmonic valve is structurally normal. There is mild pulmonic valve regurgitation. Pericardium: Trivial to small pericardial effusion. Aorta: The aortic root is normal. Pulmonary Artery: The tricuspid regurgitant velocity is 2.94 m/s, and with an estimated right atrial pressure of 3, the estimated pulmonary artery pressure is moderately elevated with the RVSP at 38 mmHg. Systemic Veins: The inferior vena cava appears normal in size, with IVC inspiratory collapse greater than 50%.  CONCLUSIONS:  1. The left ventricular systolic function is severely decreased with a visually estimated ejection fraction of 15%.  2. There is global hypokinesis of the left ventricle with minor regional variations.  3. Spectral Doppler shows a Grade III (restrictive pattern) of left ventricular diastolic filling with an elevated left atrial pressure.  4. Left ventricular cavity size is mildly dilated.  5. There is moderately reduced right ventricular systolic function.  6. Mildly enlarged right ventricle.  7. The left atrium is mildly dilated.  8. Moderate tricuspid regurgitation visualized.  9. The Doppler estimated RVSP is around 45-50 mm Hg. 10. Mild aortic valve regurgitation. 11. Moderately elevated pulmonary artery pressure. QUANTITATIVE DATA SUMMARY:  2D MEASUREMENTS:          Normal Ranges: IVSd:            0.87 cm  (0.6-1.1cm) LVPWd:           0.87 cm  (0.6-1.1cm) LVIDd:           3.35 cm  (3.9-5.9cm) LVIDs:           2.77 cm LV Mass Index:   38 g/m2 LVEDV Index:     27 ml/m2 LV % FS          17.3 %  LEFT ATRIUM:                  Normal Ranges: LA Vol A4C:        52.2 ml    (22+/-6mL/m2) LA Vol A2C:        39.6 ml LA Vol BP:         47.4 ml LA Vol Index A4C:  25.2 ml/m2 LA Vol Index A2C:  19.1 ml/m2 LA Vol Index BP:   22.8 ml/m2 LA Area A4C:       17.7 cm2 LA Area A2C:       14.8 cm2 LA Major Axis A4C: 5.1 cm LA Major  Axis A2C: 4.7 cm LA Volume Index:   22.6 ml/m2 LA Vol A4C:        49.0 ml LA Vol A2C:        36.6 ml LA Vol Index BSA:  20.6 ml/m2  RIGHT ATRIUM:          Normal Ranges: RA Area A4C:  17.6 cm2  AORTA MEASUREMENTS:         Normal Ranges: Ao Sinus, d:        2.90 cm (2.1-3.5cm) Asc Ao, d:          3.20 cm (2.1-3.4cm)  LV SYSTOLIC FUNCTION:                      Normal Ranges: EF-A4C View:    32 % (>=55%) EF-A2C View:    31 % EF-Biplane:     30 % EF-Visual:      15 % LV EF Reported: 15 %  LV DIASTOLIC FUNCTION:            Normal Ranges: MV Peak E:             0.65 m/s   (0.7-1.2 m/s) MV Peak A:             0.31 m/s   (0.42-0.7 m/s) E/A Ratio:             2.13       (1.0-2.2) MV e'                  0.030 m/s  (>8.0) MV lateral e'          0.02 m/s MV medial e'           0.04 m/s MV A Dur:              74.22 msec E/e' Ratio:            21.76      (<8.0)  MITRAL VALVE:          Normal Ranges: MV Vmax:      0.68 m/s (<=1.3m/s) MV peak P.8 mmHg (<5mmHg) MV mean P.0 mmHg (<2mmHg) MV VTI:       7.03 cm  (10-13cm) MV DT:        52 msec  (150-240msec)  AORTIC VALVE:                     Normal Ranges: AoV Vmax:                0.88 m/s (<=1.7m/s) AoV Peak PG:             3.1 mmHg (<20mmHg) AoV Mean P.9 mmHg (1.7-11.5mmHg) LVOT Max Earl:            0.60 m/s (<=1.1m/s) AoV VTI:                 13.95 cm (18-25cm) LVOT VTI:                9.81 cm LVOT Diameter:           1.85 cm  (1.8-2.4cm) AoV Area, VTI:           1.89 cm2 (2.5-5.5cm2) AoV Area,Vmax:           1.84 cm2 (2.5-4.5cm2) AoV Dimensionless Index: 0.70  RIGHT VENTRICLE: RV Basal 2.90 cm RV Mid   1.90 cm RV Major 5.6 cm TAPSE:   9.0 mm RV s'    0.06 m/s  TRICUSPID VALVE/RVSP:          Normal Ranges: Peak TR Velocity:     2.94 m/s Est. RA Pressure:     3 RV Syst Pressure:     38       (< 30mmHg) IVC Diam:             2.00 cm  PULMONIC VALVE:          Normal Ranges: PV Max Earl:     0.3 m/s  (0.6-0.9m/s) PV Max P.4 mmHg  AORTA: Asc Ao Diam  3.18 cm  [id number] Juan Kinney MD Electronically signed on 8/9/2025 at 4:59:53 PM  ** Final **            Assessment/Plan   Assessment & Plan  Acute systolic heart failure    Acute hypoxic respiratory failure    Pleural effusion      Echocardiogram from 2019: LVEF 55-60%    Echo 8/9/25 reviewed  1. The left ventricular systolic function is severely decreased with a visually estimated ejection fraction of 15%.   2. There is global hypokinesis of the left ventricle with minor regional variations.   3. Spectral Doppler shows a Grade III (restrictive pattern) of left ventricular diastolic filling with an elevated left atrial pressure.   4. Left ventricular cavity size is mildly dilated.   5. There is moderately reduced right ventricular systolic function.   6. Mildly enlarged right ventricle.   7. The left atrium is mildly dilated.   8. Moderate tricuspid regurgitation visualized.   9. The Doppler estimated RVSP is around 45-50 mm Hg.  10. Mild aortic valve regurgitation.  11. Moderately elevated pulmonary artery pressure.           Paroxysmal atrial fibrillation with RVR  -EKG reviewed AF RVR, LBBB  -Given metoprolol 5mg IV x1   -TSH 0.03  -Monitor on tele  - 8/9 started amiodarone gtt for a fib with rvr,   8/10 reduced metoprolol to succinate 25 mg daily  continue to monitor on tele, amio gtt stopped and change to 200 mg daily  8/11 Continue furosemide gtt, amiodarone gtt change to amio po 200 mg daily, currently in SR. Eliquis held for potential thoracentesis, continue heparin gtt  8/11 HR controlled, maintaining SR with occasional bradycardia into the 40's. May benefit from pacemaker implant to allow adequate beta blockade and amiodarone therapy as outpt. Eliquis on hold for thoracentesis, cont heparin gtt  8/15-No further bradycardia noted but having intermittent PAF with RVR     2. Elevated troponins-Non MI elevation  -Denies chest pain  -EKG showed AF RVR, LBBB->new since 2019  -Echo showed EF 15%  -Not a  candidate for invasive cardiac procedures due to age  -Cont metoprolol and statin     3. Acute CHF, systolic  -  -CXR showed cardiomegaly with basilar edema and moderate sized bilateral effusions  -CT chest showed large bilateral pleural effusions with total collapse of both lower lobes  -I reviewed the Echocardiogram from 2019 as above  -Strict I & Os  -Daily weights  -2gm na diet  -Given Lasix IV x1 in ER  -Lasix gtt started     --Creatinine and bicarb increasing, stop lasix gtt  -s/p right thoracentesis 8/13 with 900mL removed  -8/15-Start Lasix 40mg po daily, jardiance 10mg daily, restart losartan at lower dose, 50mg     4. Acute kidney injury  -Creatinine now up to 1.57  -Most likely cardiorenal and now diuresis  -Lasix gtt stopped, creatinine improved to 1.3 today  -Resume losartan at lower dose, start lasix PO and jardiance  -baseline Cr 0.85  -Monitor     5. Hypertension  -stable  -2gm na diet  -cont meds  -monitor     6. Hyperlipidemia  -Cont statin     7. Hypothyroidism  -TSH 0.03  -Cont synthroid at 75 mcg daily       JEN Barber-CNP                [1] amiodarone, 200 mg, oral, Daily  [Held by provider] amLODIPine, 5 mg, oral, Daily  apixaban, 2.5 mg, oral, BID  empagliflozin, 10 mg, oral, Daily  furosemide, 40 mg, oral, Daily  [Held by provider] levothyroxine, 100 mcg, oral, Daily  losartan, 50 mg, oral, Daily  metoprolol succinate XL, 25 mg, oral, Daily  polyethylene glycol, 17 g, oral, Daily  pravastatin, 40 mg, oral, Daily  sennosides-docusate sodium, 2 tablet, oral, BID     [2]    [3] PRN medications: acetaminophen, famotidine, melatonin, ondansetron, oxygen, oxygen

## 2025-08-15 NOTE — PROGRESS NOTES
08/15/25 1408   Discharge Planning   Living Arrangements Family members   Support Systems Family members;Friends/neighbors   Assistance Needed A&OX4; dependent on assist as of recent for ADLs with walker; doesn't drive; room air baseline-currently requiring [employer]/denies CPAP; PCP Dr ABDELRAHMAN Santacruz   Type of Residence Private residence   Number of Stairs to Enter Residence 5   Number of Stairs Within Residence 12   Do you have animals or pets at home? No   Who is requesting discharge planning? Provider   Home or Post Acute Services In home services   Expected Discharge Disposition Home H  (Home with new [facility] for SN, PT, OT. Medically ready, referral made. Home 02 eval completed and does not require 02 on d/c. Family will transport home.)   Does the patient need discharge transport arranged? No   Patient Choice   Provider Choice list and CMS website (https://medicare.gov/care-compare#search) for post-acute Quality and Resource Measure Data were provided and reviewed with: Patient   Stroke Family Assessment   Stroke Family Assessment Needed No   Intensity of Service   Intensity of Service 0-30 min

## 2025-08-17 ENCOUNTER — HOME CARE VISIT (OUTPATIENT)
Dept: HOME HEALTH SERVICES | Facility: HOME HEALTH | Age: OVER 89
End: 2025-08-17
Payer: MEDICARE

## 2025-08-17 VITALS
HEART RATE: 56 BPM | DIASTOLIC BLOOD PRESSURE: 60 MMHG | OXYGEN SATURATION: 95 % | SYSTOLIC BLOOD PRESSURE: 110 MMHG | BODY MASS INDEX: 19.63 KG/M2 | RESPIRATION RATE: 16 BRPM | TEMPERATURE: 96.9 F | HEIGHT: 60 IN | WEIGHT: 100 LBS

## 2025-08-17 LAB
BACTERIA FLD CULT: NORMAL
GRAM STN SPEC: NORMAL
GRAM STN SPEC: NORMAL

## 2025-08-17 PROCEDURE — 169592 NO-PAY CLAIM PROCEDURE

## 2025-08-17 PROCEDURE — G0299 HHS/HOSPICE OF RN EA 15 MIN: HCPCS | Mod: HHH

## 2025-08-17 ASSESSMENT — ENCOUNTER SYMPTOMS
PAIN LOCATION - PAIN SEVERITY: 6/10
PAIN LOCATION: RIGHT LEG
SLEEP QUALITY: ADEQUATE
DYSPNEA ON EXERTION: 1
PAIN LOCATION - PAIN FREQUENCY: INTERMITTENT
PAIN LOCATION - EXACERBATING FACTORS: MOVEMENT
PAIN: 1
LOWEST PAIN SEVERITY IN PAST 24 HOURS: 0/10
PAIN LOCATION - PAIN FREQUENCY: INTERMITTENT
PAIN LOCATION - PAIN QUALITY: DULL
AGGRESSION WITHIN DEFINED LIMITS: 1
PAIN LOCATION - RELIEVING FACTORS: TYLENOL
APPETITE LEVEL: GOOD
HIGHEST PAIN SEVERITY IN PAST 24 HOURS: 6/10
PAIN SEVERITY GOAL: 0/10
LAST BOWEL MOVEMENT: 67434
PAIN LOCATION - PAIN QUALITY: DULL
STOOL FREQUENCY: DAILY
ANGER WITHIN DEFINED LIMITS: 1
PAIN LOCATION - RELIEVING FACTORS: TYLENOL
BOWEL PATTERN NORMAL: 1
FATIGUES EASILY: 1
SHORTNESS OF BREATH: 1
PAIN LOCATION - PAIN SEVERITY: 6/10
PAIN LOCATION: LEFT LEG
PERSON REPORTING PAIN: PATIENT
DYSPNEA ACTIVITY LEVEL: AFTER AMBULATING 10 - 20 FT
CHANGE IN APPETITE: UNCHANGED
PAIN LOCATION - EXACERBATING FACTORS: MOVEMENT
SUBJECTIVE PAIN PROGRESSION: UNCHANGED

## 2025-08-17 ASSESSMENT — ACTIVITIES OF DAILY LIVING (ADL): ENTERING_EXITING_HOME: CONTACT GUARD ASSIST

## 2025-08-18 ENCOUNTER — HOME CARE VISIT (OUTPATIENT)
Dept: HOME HEALTH SERVICES | Facility: HOME HEALTH | Age: OVER 89
End: 2025-08-18
Payer: MEDICARE

## 2025-08-18 ENCOUNTER — PATIENT OUTREACH (OUTPATIENT)
Dept: PRIMARY CARE | Facility: CLINIC | Age: OVER 89
End: 2025-08-18
Payer: MEDICARE

## 2025-08-18 VITALS
OXYGEN SATURATION: 99 % | HEART RATE: 63 BPM | TEMPERATURE: 98.8 F | SYSTOLIC BLOOD PRESSURE: 128 MMHG | DIASTOLIC BLOOD PRESSURE: 60 MMHG

## 2025-08-18 PROCEDURE — G0151 HHCP-SERV OF PT,EA 15 MIN: HCPCS | Mod: HHH

## 2025-08-18 SDOH — HEALTH STABILITY: PHYSICAL HEALTH: PHYSICAL EXERCISE: SEATED

## 2025-08-18 SDOH — HEALTH STABILITY: PHYSICAL HEALTH: EXERCISE ACTIVITY: LAQ AND HIP FLEXION

## 2025-08-18 SDOH — HEALTH STABILITY: PHYSICAL HEALTH: PHYSICAL EXERCISE: 10

## 2025-08-18 SDOH — HEALTH STABILITY: PHYSICAL HEALTH: EXERCISE ACTIVITY: HEEL AND TOE RAISES

## 2025-08-18 ASSESSMENT — ENCOUNTER SYMPTOMS
PERSON REPORTING PAIN: PATIENT
MUSCLE WEAKNESS: 1
DENIES PAIN: 1
OCCASIONAL FEELINGS OF UNSTEADINESS: 1

## 2025-08-18 ASSESSMENT — ACTIVITIES OF DAILY LIVING (ADL)
AMBULATION_DISTANCE/DURATION_TOLERATED: 60 FEET
AMBULATION ASSISTANCE ON FLAT SURFACES: 1
AMBULATION ASSISTANCE: STAND BY ASSIST
OASIS_M1830: 06
CURRENT_FUNCTION: STAND BY ASSIST

## 2025-08-19 LAB
LABORATORY COMMENT REPORT: NORMAL
LABORATORY COMMENT REPORT: NORMAL
PATH REPORT.FINAL DX SPEC: NORMAL
PATH REPORT.GROSS SPEC: NORMAL
PATH REPORT.RELEVANT HX SPEC: NORMAL
PATH REPORT.TOTAL CANCER: NORMAL

## 2025-08-20 LAB
Q ONSET: 213 MS
Q ONSET: 215 MS
QRS COUNT: 12 BEATS
QRS COUNT: 12 BEATS
QRS DURATION: 146 MS
QRS DURATION: 152 MS
QT INTERVAL: 408 MS
QT INTERVAL: 412 MS
QTC CALCULATION(BAZETT): 438 MS
QTC CALCULATION(BAZETT): 440 MS
QTC FREDERICIA: 429 MS
QTC FREDERICIA: 429 MS
R AXIS: -9 DEGREES
R AXIS: 0 DEGREES
T AXIS: 138 DEGREES
T AXIS: 156 DEGREES
T OFFSET: 417 MS
T OFFSET: 421 MS
VENTRICULAR RATE: 68 BPM
VENTRICULAR RATE: 70 BPM

## 2025-08-21 ENCOUNTER — HOME CARE VISIT (OUTPATIENT)
Dept: HOME HEALTH SERVICES | Facility: HOME HEALTH | Age: OVER 89
End: 2025-08-21
Payer: MEDICARE

## 2025-08-21 ENCOUNTER — APPOINTMENT (OUTPATIENT)
Dept: HOME HEALTH SERVICES | Facility: HOME HEALTH | Age: OVER 89
End: 2025-08-21
Payer: MEDICARE

## 2025-08-21 VITALS
HEART RATE: 66 BPM | DIASTOLIC BLOOD PRESSURE: 60 MMHG | TEMPERATURE: 97.1 F | RESPIRATION RATE: 18 BRPM | OXYGEN SATURATION: 95 % | SYSTOLIC BLOOD PRESSURE: 120 MMHG

## 2025-08-21 LAB
Q ONSET: 217 MS
QRS COUNT: 22 BEATS
QRS DURATION: 136 MS
QT INTERVAL: 346 MS
QTC CALCULATION(BAZETT): 512 MS
QTC FREDERICIA: 450 MS
R AXIS: -25 DEGREES
T AXIS: 143 DEGREES
T OFFSET: 390 MS
VENTRICULAR RATE: 132 BPM

## 2025-08-21 PROCEDURE — G0300 HHS/HOSPICE OF LPN EA 15 MIN: HCPCS | Mod: HHH

## 2025-08-21 ASSESSMENT — ENCOUNTER SYMPTOMS
APPETITE LEVEL: FAIR
MUSCLE WEAKNESS: 1
CHANGE IN APPETITE: INCREASED
DENIES PAIN: 1
LAST BOWEL MOVEMENT: 67438

## 2025-08-22 ENCOUNTER — OFFICE VISIT (OUTPATIENT)
Dept: PRIMARY CARE | Facility: CLINIC | Age: OVER 89
End: 2025-08-22
Payer: MEDICARE

## 2025-08-22 VITALS
OXYGEN SATURATION: 94 % | SYSTOLIC BLOOD PRESSURE: 126 MMHG | BODY MASS INDEX: 20.7 KG/M2 | HEART RATE: 58 BPM | DIASTOLIC BLOOD PRESSURE: 64 MMHG | WEIGHT: 106 LBS

## 2025-08-22 DIAGNOSIS — R91.1 LUNG NODULE: ICD-10-CM

## 2025-08-22 DIAGNOSIS — E03.9 HYPOTHYROIDISM, UNSPECIFIED TYPE: ICD-10-CM

## 2025-08-22 DIAGNOSIS — J90 PLEURAL EFFUSION: ICD-10-CM

## 2025-08-22 DIAGNOSIS — I48.0 PAROXYSMAL ATRIAL FIBRILLATION (MULTI): Primary | ICD-10-CM

## 2025-08-22 DIAGNOSIS — I50.9 CONGESTIVE HEART FAILURE, UNSPECIFIED HF CHRONICITY, UNSPECIFIED HEART FAILURE TYPE: ICD-10-CM

## 2025-08-22 DIAGNOSIS — I10 BENIGN ESSENTIAL HYPERTENSION: ICD-10-CM

## 2025-08-22 PROCEDURE — 3074F SYST BP LT 130 MM HG: CPT | Performed by: FAMILY MEDICINE

## 2025-08-22 PROCEDURE — 99496 TRANSJ CARE MGMT HIGH F2F 7D: CPT | Performed by: FAMILY MEDICINE

## 2025-08-22 PROCEDURE — 1159F MED LIST DOCD IN RCRD: CPT | Performed by: FAMILY MEDICINE

## 2025-08-22 PROCEDURE — 1111F DSCHRG MED/CURRENT MED MERGE: CPT | Performed by: FAMILY MEDICINE

## 2025-08-22 PROCEDURE — 3078F DIAST BP <80 MM HG: CPT | Performed by: FAMILY MEDICINE

## 2025-08-22 PROCEDURE — 1160F RVW MEDS BY RX/DR IN RCRD: CPT | Performed by: FAMILY MEDICINE

## 2025-08-24 PROBLEM — R91.1 LUNG NODULE: Status: ACTIVE | Noted: 2025-08-24

## 2025-08-24 PROBLEM — I50.9 CONGESTIVE HEART FAILURE: Status: ACTIVE | Noted: 2025-08-24

## 2025-08-26 ENCOUNTER — TELEPHONE (OUTPATIENT)
Dept: PRIMARY CARE | Facility: CLINIC | Age: OVER 89
End: 2025-08-26
Payer: MEDICARE

## 2025-08-26 ENCOUNTER — HOME CARE VISIT (OUTPATIENT)
Dept: HOME HEALTH SERVICES | Facility: HOME HEALTH | Age: OVER 89
End: 2025-08-26
Payer: MEDICARE

## 2025-08-26 VITALS
HEART RATE: 48 BPM | TEMPERATURE: 98 F | DIASTOLIC BLOOD PRESSURE: 74 MMHG | OXYGEN SATURATION: 98 % | SYSTOLIC BLOOD PRESSURE: 140 MMHG | RESPIRATION RATE: 12 BRPM

## 2025-08-26 DIAGNOSIS — K59.00 CONSTIPATION, UNSPECIFIED CONSTIPATION TYPE: Primary | ICD-10-CM

## 2025-08-26 PROCEDURE — G0157 HHC PT ASSISTANT EA 15: HCPCS | Mod: CQ,HHH

## 2025-08-26 RX ORDER — POLYETHYLENE GLYCOL 3350 17 G/17G
17 POWDER, FOR SOLUTION ORAL DAILY
Qty: 510 G | Refills: 11 | Status: SHIPPED | OUTPATIENT
Start: 2025-08-26 | End: 2026-08-26

## 2025-08-26 ASSESSMENT — ENCOUNTER SYMPTOMS
PAIN: 1
PAIN LOCATION: RIGHT LEG
PAIN LOCATION - PAIN QUALITY: ACHING
PAIN LOCATION - RELIEVING FACTORS: ADVIL
PAIN LOCATION - PAIN FREQUENCY: FREQUENT
PAIN LOCATION - PAIN QUALITY: ACHING
PAIN LOCATION - PAIN FREQUENCY: FREQUENT
PAIN LOCATION: LEFT LEG
PAIN LOCATION - PAIN SEVERITY: 2/10
PAIN LOCATION - PAIN DURATION: CHRONIC
PAIN LOCATION - PAIN SEVERITY: 2/10
PAIN LOCATION - PAIN DURATION: CHRONIC
PAIN LOCATION - RELIEVING FACTORS: ADVIL
PERSON REPORTING PAIN: PATIENT

## 2025-08-28 ENCOUNTER — HOME CARE VISIT (OUTPATIENT)
Dept: HOME HEALTH SERVICES | Facility: HOME HEALTH | Age: OVER 89
End: 2025-08-28
Payer: MEDICARE

## 2025-08-28 VITALS
TEMPERATURE: 97.1 F | RESPIRATION RATE: 20 BRPM | SYSTOLIC BLOOD PRESSURE: 120 MMHG | WEIGHT: 108 LBS | HEART RATE: 52 BPM | BODY MASS INDEX: 21.09 KG/M2 | DIASTOLIC BLOOD PRESSURE: 60 MMHG | OXYGEN SATURATION: 95 %

## 2025-08-28 PROCEDURE — G0299 HHS/HOSPICE OF RN EA 15 MIN: HCPCS | Mod: HHH

## 2025-08-28 ASSESSMENT — ENCOUNTER SYMPTOMS
PAIN: 1
PAIN LOCATION - PAIN FREQUENCY: INTERMITTENT
PAIN LOCATION: LEFT LEG
FATIGUE: 1
PAIN LOCATION - PAIN QUALITY: DULL
LAST BOWEL MOVEMENT: 67444
LOWEST PAIN SEVERITY IN PAST 24 HOURS: 0/10
APPETITE LEVEL: GOOD
PAIN LOCATION - RELIEVING FACTORS: TYLENOL
PERSON REPORTING PAIN: PATIENT
CONSTIPATION: 1
PAIN LOCATION - PAIN SEVERITY: 6/10
PAIN SEVERITY GOAL: 0/10
PAIN LOCATION - PAIN FREQUENCY: INTERMITTENT
STOOL FREQUENCY: LESS THAN DAILY
HIGHEST PAIN SEVERITY IN PAST 24 HOURS: 6/10
PAIN LOCATION - PAIN SEVERITY: 6/10
PAIN LOCATION - PAIN QUALITY: DULL
PAIN LOCATION: RIGHT LEG
PAIN LOCATION - RELIEVING FACTORS: TYLENOL
CHANGE IN APPETITE: UNCHANGED
SUBJECTIVE PAIN PROGRESSION: UNCHANGED

## 2025-09-02 ENCOUNTER — HOME CARE VISIT (OUTPATIENT)
Dept: HOME HEALTH SERVICES | Facility: HOME HEALTH | Age: OVER 89
End: 2025-09-02
Payer: MEDICARE

## 2025-09-05 ENCOUNTER — HOME CARE VISIT (OUTPATIENT)
Dept: HOME HEALTH SERVICES | Facility: HOME HEALTH | Age: OVER 89
End: 2025-09-05
Payer: MEDICARE

## 2025-09-05 LAB
ATRIAL RATE: 147 BPM
Q ONSET: 216 MS
QRS COUNT: 23 BEATS
QRS DURATION: 138 MS
QT INTERVAL: 350 MS
QTC CALCULATION(BAZETT): 530 MS
QTC FREDERICIA: 462 MS
R AXIS: -19 DEGREES
T AXIS: 158 DEGREES
T OFFSET: 391 MS
VENTRICULAR RATE: 138 BPM

## 2025-09-19 ENCOUNTER — APPOINTMENT (OUTPATIENT)
Dept: PRIMARY CARE | Facility: CLINIC | Age: OVER 89
End: 2025-09-19
Payer: MEDICARE

## 2025-12-01 ENCOUNTER — APPOINTMENT (OUTPATIENT)
Dept: PRIMARY CARE | Facility: CLINIC | Age: OVER 89
End: 2025-12-01
Payer: MEDICARE